# Patient Record
Sex: MALE | Race: BLACK OR AFRICAN AMERICAN | URBAN - METROPOLITAN AREA
[De-identification: names, ages, dates, MRNs, and addresses within clinical notes are randomized per-mention and may not be internally consistent; named-entity substitution may affect disease eponyms.]

---

## 2024-05-03 ENCOUNTER — APPOINTMENT (EMERGENCY)
Dept: RADIOLOGY | Facility: HOSPITAL | Age: 64
DRG: 469 | End: 2024-05-03
Payer: MEDICARE

## 2024-05-03 ENCOUNTER — HOSPITAL ENCOUNTER (INPATIENT)
Facility: HOSPITAL | Age: 64
LOS: 11 days | Discharge: NURSING, REHAB OR LONG-TERM CARE | DRG: 469 | End: 2024-05-15
Attending: EMERGENCY MEDICINE | Admitting: INTERNAL MEDICINE
Payer: MEDICARE

## 2024-05-03 DIAGNOSIS — M54.41 CHRONIC RIGHT-SIDED LOW BACK PAIN WITH RIGHT-SIDED SCIATICA: ICD-10-CM

## 2024-05-03 DIAGNOSIS — I45.5 SINUS PAUSE: ICD-10-CM

## 2024-05-03 DIAGNOSIS — L03.811 ABSCESS OR CELLULITIS OF SCALP: ICD-10-CM

## 2024-05-03 DIAGNOSIS — R07.9 ACUTE CHEST PAIN: Primary | ICD-10-CM

## 2024-05-03 DIAGNOSIS — G89.29 CHRONIC RIGHT-SIDED LOW BACK PAIN WITH RIGHT-SIDED SCIATICA: ICD-10-CM

## 2024-05-03 DIAGNOSIS — J81.1 PULMONARY EDEMA: ICD-10-CM

## 2024-05-03 DIAGNOSIS — I48.92 ATRIAL FLUTTER (HCC): ICD-10-CM

## 2024-05-03 DIAGNOSIS — N17.9 ACUTE RENAL FAILURE (ARF) (HCC): ICD-10-CM

## 2024-05-03 LAB
ALBUMIN SERPL BCP-MCNC: 3.2 G/DL (ref 3.5–5)
ALP SERPL-CCNC: 147 U/L (ref 34–104)
ALT SERPL W P-5'-P-CCNC: 20 U/L (ref 7–52)
ANION GAP SERPL CALCULATED.3IONS-SCNC: 7 MMOL/L (ref 4–13)
AST SERPL W P-5'-P-CCNC: 20 U/L (ref 13–39)
BASOPHILS # BLD AUTO: 0.03 THOUSANDS/ÂΜL (ref 0–0.1)
BASOPHILS NFR BLD AUTO: 1 % (ref 0–1)
BILIRUB SERPL-MCNC: 0.36 MG/DL (ref 0.2–1)
BNP SERPL-MCNC: 220 PG/ML (ref 0–100)
BUN SERPL-MCNC: 59 MG/DL (ref 5–25)
CALCIUM ALBUM COR SERPL-MCNC: 8.8 MG/DL (ref 8.3–10.1)
CALCIUM SERPL-MCNC: 8.2 MG/DL (ref 8.4–10.2)
CARDIAC TROPONIN I PNL SERPL HS: 30 NG/L
CHLORIDE SERPL-SCNC: 111 MMOL/L (ref 96–108)
CO2 SERPL-SCNC: 20 MMOL/L (ref 21–32)
CREAT SERPL-MCNC: 7.05 MG/DL (ref 0.6–1.3)
EOSINOPHIL # BLD AUTO: 0.27 THOUSAND/ÂΜL (ref 0–0.61)
EOSINOPHIL NFR BLD AUTO: 4 % (ref 0–6)
ERYTHROCYTE [DISTWIDTH] IN BLOOD BY AUTOMATED COUNT: 14.5 % (ref 11.6–15.1)
FLUAV RNA RESP QL NAA+PROBE: NEGATIVE
FLUBV RNA RESP QL NAA+PROBE: NEGATIVE
GFR SERPL CREATININE-BSD FRML MDRD: 7 ML/MIN/1.73SQ M
GLUCOSE SERPL-MCNC: 191 MG/DL (ref 65–140)
GLUCOSE SERPL-MCNC: 193 MG/DL (ref 65–140)
HCT VFR BLD AUTO: 30.9 % (ref 36.5–49.3)
HGB BLD-MCNC: 9.6 G/DL (ref 12–17)
IMM GRANULOCYTES # BLD AUTO: 0.03 THOUSAND/UL (ref 0–0.2)
IMM GRANULOCYTES NFR BLD AUTO: 1 % (ref 0–2)
LYMPHOCYTES # BLD AUTO: 1.59 THOUSANDS/ÂΜL (ref 0.6–4.47)
LYMPHOCYTES NFR BLD AUTO: 25 % (ref 14–44)
MCH RBC QN AUTO: 30.8 PG (ref 26.8–34.3)
MCHC RBC AUTO-ENTMCNC: 31.1 G/DL (ref 31.4–37.4)
MCV RBC AUTO: 99 FL (ref 82–98)
MONOCYTES # BLD AUTO: 0.42 THOUSAND/ÂΜL (ref 0.17–1.22)
MONOCYTES NFR BLD AUTO: 7 % (ref 4–12)
NEUTROPHILS # BLD AUTO: 3.95 THOUSANDS/ÂΜL (ref 1.85–7.62)
NEUTS SEG NFR BLD AUTO: 62 % (ref 43–75)
NRBC BLD AUTO-RTO: 0 /100 WBCS
PLATELET # BLD AUTO: 233 THOUSANDS/UL (ref 149–390)
PMV BLD AUTO: 9.6 FL (ref 8.9–12.7)
POTASSIUM SERPL-SCNC: 5 MMOL/L (ref 3.5–5.3)
PROT SERPL-MCNC: 6.9 G/DL (ref 6.4–8.4)
RBC # BLD AUTO: 3.12 MILLION/UL (ref 3.88–5.62)
RSV RNA RESP QL NAA+PROBE: NEGATIVE
SARS-COV-2 RNA RESP QL NAA+PROBE: NEGATIVE
SODIUM SERPL-SCNC: 138 MMOL/L (ref 135–147)
WBC # BLD AUTO: 6.29 THOUSAND/UL (ref 4.31–10.16)

## 2024-05-03 PROCEDURE — 82728 ASSAY OF FERRITIN: CPT

## 2024-05-03 PROCEDURE — 82948 REAGENT STRIP/BLOOD GLUCOSE: CPT

## 2024-05-03 PROCEDURE — 82746 ASSAY OF FOLIC ACID SERUM: CPT

## 2024-05-03 PROCEDURE — 84484 ASSAY OF TROPONIN QUANT: CPT | Performed by: EMERGENCY MEDICINE

## 2024-05-03 PROCEDURE — NC001 PR NO CHARGE: Performed by: RADIOLOGY

## 2024-05-03 PROCEDURE — 85379 FIBRIN DEGRADATION QUANT: CPT | Performed by: EMERGENCY MEDICINE

## 2024-05-03 PROCEDURE — 36415 COLL VENOUS BLD VENIPUNCTURE: CPT | Performed by: EMERGENCY MEDICINE

## 2024-05-03 PROCEDURE — 82607 VITAMIN B-12: CPT

## 2024-05-03 PROCEDURE — 83550 IRON BINDING TEST: CPT

## 2024-05-03 PROCEDURE — 71046 X-RAY EXAM CHEST 2 VIEWS: CPT

## 2024-05-03 PROCEDURE — 99285 EMERGENCY DEPT VISIT HI MDM: CPT | Performed by: EMERGENCY MEDICINE

## 2024-05-03 PROCEDURE — 93005 ELECTROCARDIOGRAM TRACING: CPT

## 2024-05-03 PROCEDURE — 94760 N-INVAS EAR/PLS OXIMETRY 1: CPT

## 2024-05-03 PROCEDURE — 80053 COMPREHEN METABOLIC PANEL: CPT | Performed by: EMERGENCY MEDICINE

## 2024-05-03 PROCEDURE — 83540 ASSAY OF IRON: CPT

## 2024-05-03 PROCEDURE — 83880 ASSAY OF NATRIURETIC PEPTIDE: CPT | Performed by: EMERGENCY MEDICINE

## 2024-05-03 PROCEDURE — 0241U HB NFCT DS VIR RESP RNA 4 TRGT: CPT | Performed by: EMERGENCY MEDICINE

## 2024-05-03 PROCEDURE — 99285 EMERGENCY DEPT VISIT HI MDM: CPT

## 2024-05-03 PROCEDURE — 94002 VENT MGMT INPAT INIT DAY: CPT

## 2024-05-03 PROCEDURE — 85025 COMPLETE CBC W/AUTO DIFF WBC: CPT | Performed by: EMERGENCY MEDICINE

## 2024-05-04 ENCOUNTER — APPOINTMENT (INPATIENT)
Dept: NON INVASIVE DIAGNOSTICS | Facility: HOSPITAL | Age: 64
DRG: 469 | End: 2024-05-04
Payer: MEDICARE

## 2024-05-04 ENCOUNTER — APPOINTMENT (INPATIENT)
Dept: CT IMAGING | Facility: HOSPITAL | Age: 64
DRG: 469 | End: 2024-05-04
Payer: MEDICARE

## 2024-05-04 PROBLEM — N18.32 STAGE 3B CHRONIC KIDNEY DISEASE (HCC): Status: ACTIVE | Noted: 2022-07-21

## 2024-05-04 PROBLEM — N17.9 AKI (ACUTE KIDNEY INJURY) (HCC): Status: ACTIVE | Noted: 2022-06-20

## 2024-05-04 PROBLEM — G89.29 CHRONIC RIGHT-SIDED LOW BACK PAIN WITH RIGHT-SIDED SCIATICA: Status: ACTIVE | Noted: 2021-04-26

## 2024-05-04 PROBLEM — E78.5 HYPERLIPIDEMIA: Status: ACTIVE | Noted: 2022-06-22

## 2024-05-04 PROBLEM — G47.33 SEVERE OBSTRUCTIVE SLEEP APNEA: Chronic | Status: ACTIVE | Noted: 2017-10-18

## 2024-05-04 PROBLEM — I16.1 HYPERTENSIVE EMERGENCY: Status: ACTIVE | Noted: 2024-05-04

## 2024-05-04 PROBLEM — M54.41 CHRONIC RIGHT-SIDED LOW BACK PAIN WITH RIGHT-SIDED SCIATICA: Status: ACTIVE | Noted: 2021-04-26

## 2024-05-04 LAB
2HR DELTA HS TROPONIN: -3 NG/L
4HR DELTA HS TROPONIN: -2 NG/L
ALBUMIN SERPL BCP-MCNC: 3.1 G/DL (ref 3.5–5)
ALP SERPL-CCNC: 138 U/L (ref 34–104)
ALT SERPL W P-5'-P-CCNC: 18 U/L (ref 7–52)
ANION GAP SERPL CALCULATED.3IONS-SCNC: 7 MMOL/L (ref 4–13)
AST SERPL W P-5'-P-CCNC: 15 U/L (ref 13–39)
BASE EX.OXY STD BLDV CALC-SCNC: 63.5 % (ref 60–80)
BASE EXCESS BLDV CALC-SCNC: -7.4 MMOL/L
BILIRUB SERPL-MCNC: 0.43 MG/DL (ref 0.2–1)
BUN SERPL-MCNC: 58 MG/DL (ref 5–25)
CALCIUM ALBUM COR SERPL-MCNC: 8.8 MG/DL (ref 8.3–10.1)
CALCIUM SERPL-MCNC: 8.1 MG/DL (ref 8.4–10.2)
CARDIAC TROPONIN I PNL SERPL HS: 27 NG/L
CARDIAC TROPONIN I PNL SERPL HS: 28 NG/L
CHLORIDE SERPL-SCNC: 113 MMOL/L (ref 96–108)
CO2 SERPL-SCNC: 20 MMOL/L (ref 21–32)
CREAT SERPL-MCNC: 6.97 MG/DL (ref 0.6–1.3)
D DIMER PPP FEU-MCNC: 1.91 UG/ML FEU
FERRITIN SERPL-MCNC: 58 NG/ML (ref 24–336)
FOLATE SERPL-MCNC: 13.9 NG/ML
GFR SERPL CREATININE-BSD FRML MDRD: 7 ML/MIN/1.73SQ M
GLUCOSE SERPL-MCNC: 108 MG/DL (ref 65–140)
GLUCOSE SERPL-MCNC: 117 MG/DL (ref 65–140)
GLUCOSE SERPL-MCNC: 122 MG/DL (ref 65–140)
GLUCOSE SERPL-MCNC: 136 MG/DL (ref 65–140)
GLUCOSE SERPL-MCNC: 143 MG/DL (ref 65–140)
HCO3 BLDV-SCNC: 19.4 MMOL/L (ref 24–30)
IRON SATN MFR SERPL: 16 % (ref 15–50)
IRON SERPL-MCNC: 37 UG/DL (ref 50–212)
O2 CT BLDV-SCNC: 8.6 ML/DL
PCO2 BLDV: 45 MM HG (ref 42–50)
PH BLDV: 7.25 [PH] (ref 7.3–7.4)
PLATELET # BLD AUTO: 223 THOUSANDS/UL (ref 149–390)
PMV BLD AUTO: 9.1 FL (ref 8.9–12.7)
PO2 BLDV: 36.5 MM HG (ref 35–45)
POTASSIUM SERPL-SCNC: 4.7 MMOL/L (ref 3.5–5.3)
PROT SERPL-MCNC: 6.7 G/DL (ref 6.4–8.4)
SODIUM SERPL-SCNC: 140 MMOL/L (ref 135–147)
TIBC SERPL-MCNC: 235 UG/DL (ref 250–450)
UIBC SERPL-MCNC: 198 UG/DL (ref 155–355)
VIT B12 SERPL-MCNC: 140 PG/ML (ref 180–914)

## 2024-05-04 PROCEDURE — 80053 COMPREHEN METABOLIC PANEL: CPT

## 2024-05-04 PROCEDURE — 99222 1ST HOSP IP/OBS MODERATE 55: CPT | Performed by: INTERNAL MEDICINE

## 2024-05-04 PROCEDURE — 94760 N-INVAS EAR/PLS OXIMETRY 1: CPT

## 2024-05-04 PROCEDURE — 93306 TTE W/DOPPLER COMPLETE: CPT | Performed by: INTERNAL MEDICINE

## 2024-05-04 PROCEDURE — 36415 COLL VENOUS BLD VENIPUNCTURE: CPT | Performed by: EMERGENCY MEDICINE

## 2024-05-04 PROCEDURE — 99245 OFF/OP CONSLTJ NEW/EST HI 55: CPT | Performed by: STUDENT IN AN ORGANIZED HEALTH CARE EDUCATION/TRAINING PROGRAM

## 2024-05-04 PROCEDURE — 99223 1ST HOSP IP/OBS HIGH 75: CPT | Performed by: INTERNAL MEDICINE

## 2024-05-04 PROCEDURE — 82948 REAGENT STRIP/BLOOD GLUCOSE: CPT

## 2024-05-04 PROCEDURE — 85049 AUTOMATED PLATELET COUNT: CPT

## 2024-05-04 PROCEDURE — 82805 BLOOD GASES W/O2 SATURATION: CPT

## 2024-05-04 PROCEDURE — 84484 ASSAY OF TROPONIN QUANT: CPT | Performed by: EMERGENCY MEDICINE

## 2024-05-04 PROCEDURE — 93306 TTE W/DOPPLER COMPLETE: CPT

## 2024-05-04 PROCEDURE — 74176 CT ABD & PELVIS W/O CONTRAST: CPT

## 2024-05-04 RX ORDER — INSULIN GLARGINE 100 [IU]/ML
20 INJECTION, SOLUTION SUBCUTANEOUS
Status: DISCONTINUED | OUTPATIENT
Start: 2024-05-04 | End: 2024-05-04

## 2024-05-04 RX ORDER — INSULIN LISPRO 100 [IU]/ML
1-6 INJECTION, SOLUTION INTRAVENOUS; SUBCUTANEOUS
Status: DISCONTINUED | OUTPATIENT
Start: 2024-05-04 | End: 2024-05-15 | Stop reason: HOSPADM

## 2024-05-04 RX ORDER — LABETALOL HYDROCHLORIDE 5 MG/ML
10 INJECTION, SOLUTION INTRAVENOUS EVERY 4 HOURS PRN
Status: CANCELLED | OUTPATIENT
Start: 2024-05-04

## 2024-05-04 RX ORDER — METOPROLOL TARTRATE 50 MG/1
50 TABLET, FILM COATED ORAL EVERY 12 HOURS SCHEDULED
Status: CANCELLED | OUTPATIENT
Start: 2024-05-04

## 2024-05-04 RX ORDER — HEPARIN SODIUM 5000 [USP'U]/ML
5000 INJECTION, SOLUTION INTRAVENOUS; SUBCUTANEOUS EVERY 8 HOURS SCHEDULED
Status: DISCONTINUED | OUTPATIENT
Start: 2024-05-04 | End: 2024-05-04

## 2024-05-04 RX ORDER — NITROGLYCERIN 0.4 MG/1
0.4 TABLET SUBLINGUAL ONCE
Status: COMPLETED | OUTPATIENT
Start: 2024-05-04 | End: 2024-05-04

## 2024-05-04 RX ORDER — ACETAMINOPHEN 325 MG/1
650 TABLET ORAL EVERY 6 HOURS PRN
Status: DISCONTINUED | OUTPATIENT
Start: 2024-05-04 | End: 2024-05-15 | Stop reason: HOSPADM

## 2024-05-04 RX ORDER — CEPHALEXIN 250 MG/1
500 CAPSULE ORAL EVERY 8 HOURS SCHEDULED
Status: DISCONTINUED | OUTPATIENT
Start: 2024-05-04 | End: 2024-05-04

## 2024-05-04 RX ORDER — LABETALOL HYDROCHLORIDE 5 MG/ML
10 INJECTION, SOLUTION INTRAVENOUS EVERY 4 HOURS PRN
Status: DISCONTINUED | OUTPATIENT
Start: 2024-05-04 | End: 2024-05-07

## 2024-05-04 RX ORDER — BUMETANIDE 0.25 MG/ML
1 INJECTION INTRAMUSCULAR; INTRAVENOUS CONTINUOUS
Status: DISCONTINUED | OUTPATIENT
Start: 2024-05-04 | End: 2024-05-05

## 2024-05-04 RX ORDER — SODIUM BICARBONATE 650 MG/1
650 TABLET ORAL
Status: DISCONTINUED | OUTPATIENT
Start: 2024-05-04 | End: 2024-05-06

## 2024-05-04 RX ORDER — CEPHALEXIN 250 MG/1
250 CAPSULE ORAL EVERY 12 HOURS SCHEDULED
Status: DISCONTINUED | OUTPATIENT
Start: 2024-05-04 | End: 2024-05-08

## 2024-05-04 RX ORDER — LABETALOL HYDROCHLORIDE 5 MG/ML
10 INJECTION, SOLUTION INTRAVENOUS EVERY 4 HOURS
Status: DISCONTINUED | OUTPATIENT
Start: 2024-05-04 | End: 2024-05-04

## 2024-05-04 RX ORDER — FUROSEMIDE 10 MG/ML
40 INJECTION INTRAMUSCULAR; INTRAVENOUS 2 TIMES DAILY
Status: CANCELLED | OUTPATIENT
Start: 2024-05-04

## 2024-05-04 RX ORDER — FUROSEMIDE 10 MG/ML
20 INJECTION INTRAMUSCULAR; INTRAVENOUS ONCE
Status: COMPLETED | OUTPATIENT
Start: 2024-05-04 | End: 2024-05-04

## 2024-05-04 RX ORDER — INSULIN GLARGINE 100 [IU]/ML
20 INJECTION, SOLUTION SUBCUTANEOUS EVERY 12 HOURS SCHEDULED
Status: DISCONTINUED | OUTPATIENT
Start: 2024-05-04 | End: 2024-05-06

## 2024-05-04 RX ORDER — METOPROLOL TARTRATE 50 MG/1
50 TABLET, FILM COATED ORAL EVERY 12 HOURS SCHEDULED
Status: DISCONTINUED | OUTPATIENT
Start: 2024-05-04 | End: 2024-05-05

## 2024-05-04 RX ADMIN — INSULIN GLARGINE 20 UNITS: 100 INJECTION, SOLUTION SUBCUTANEOUS at 10:39

## 2024-05-04 RX ADMIN — METOPROLOL TARTRATE 50 MG: 50 TABLET, FILM COATED ORAL at 12:35

## 2024-05-04 RX ADMIN — APIXABAN 2.5 MG: 2.5 TABLET, FILM COATED ORAL at 18:22

## 2024-05-04 RX ADMIN — CEPHALEXIN 250 MG: 250 CAPSULE ORAL at 20:18

## 2024-05-04 RX ADMIN — METOPROLOL TARTRATE 50 MG: 50 TABLET, FILM COATED ORAL at 20:18

## 2024-05-04 RX ADMIN — ACETAMINOPHEN 650 MG: 325 TABLET, FILM COATED ORAL at 19:42

## 2024-05-04 RX ADMIN — NITROGLYCERIN 0.4 MG: 0.4 TABLET SUBLINGUAL at 00:55

## 2024-05-04 RX ADMIN — Medication 1 MG/HR: at 10:39

## 2024-05-04 RX ADMIN — CEPHALEXIN 250 MG: 250 CAPSULE ORAL at 15:55

## 2024-05-04 RX ADMIN — FUROSEMIDE 20 MG: 10 INJECTION, SOLUTION INTRAMUSCULAR; INTRAVENOUS at 05:21

## 2024-05-04 RX ADMIN — Medication 1 MG/HR: at 15:43

## 2024-05-04 RX ADMIN — Medication 10 MG: at 22:33

## 2024-05-04 RX ADMIN — INSULIN GLARGINE 20 UNITS: 100 INJECTION, SOLUTION SUBCUTANEOUS at 20:18

## 2024-05-04 RX ADMIN — SODIUM BICARBONATE 650 MG: 650 TABLET ORAL at 10:38

## 2024-05-04 RX ADMIN — APIXABAN 2.5 MG: 2.5 TABLET, FILM COATED ORAL at 12:35

## 2024-05-04 RX ADMIN — HEPARIN SODIUM 5000 UNITS: 5000 INJECTION INTRAVENOUS; SUBCUTANEOUS at 05:21

## 2024-05-04 RX ADMIN — Medication 1 MG/HR: at 20:04

## 2024-05-04 RX ADMIN — SODIUM BICARBONATE 650 MG: 650 TABLET ORAL at 18:22

## 2024-05-04 NOTE — ED NOTES
Repeat EKG done. RN concern for EKG changes. Signed and evaluated by Dr. Flores. Copied and filed in chart.      Vandana Godoy RN  05/03/24 8108

## 2024-05-04 NOTE — CONSULTS
Cardiology   Jerod Worthington 63 y.o. male MRN: [id number]  Unit/Bed#: ED-21 Encounter: [id number]      Reason for Consult / Principal Problem:chest pain/pulmonary edema    Physician Requesting Consult:  Thanh De La Rosa MD    Cardiologist: Dr. Davis    PCP:No primary care provider on file.      Assessment/Plan:  Atrial flutter   Add Beta blocker  Metoprolol 50 mg P q12.  Needs anticoagulation.    Eliquis 2.5 BID  cleared for use by nephrology.    Shortness of breath with chest pain resolved at the present time.Troponins are negative.  Recommend 2D echocardiogram.    History of chronic kidney disease/acute renal failure -avoid NSAIDs and any other nephrotoxic agents.  Nephrology seen patient.  Daily weight and intake and output.      Bumex infusion at 1 mg/hr    Hypertension losartan on hold BP still elevated at 179/100.  Consider labetalol 10 mg IV every 10 minutes until systolic pressures below 160.    Hyperlipidemia Lipitor 40 mg daily and aspirin 81 mg daily      HPI: Patient was interviewed via  #[id number]     Jerod Worthington 63 y.o. year old male who presents with shortness of breath, cough and sneezing for 2 days and significant lower leg edema.  Patient had report no contacts with sick patient.  He recently traveled back from [place] after visiting his brother for 1 month.  Patient has a history of hypertension diabetes type 2 and CKD Stage IV.  Patient has no PCP but appears to follow with diabetic clinic at [facility] and to [facility].  On admission his blood pressure was 193/120.  Patient does note that he had significant lower leg edema and chest pain when he exerted himself over the past month.      Patient was seen at the diabetic clinic and had lab drawn yesterday. His HDL was 35, , cholesterol 170 and creatinine triglycerides were 80.  Creatinine was 7.2 and GFR was not calculated.    Patient does have a family history of coronary disease his mother had bypass surgery.   His  brother is diabetic and has kidney problems. He  lost a relative to kidney disease who was on dialysis.    Patient states he takes medication for his blood pressure but does not know the name of it.  He did state that he was taken off 1 blood pressure medication due to his kidney function.  Patient takes Lantus insulin subcu and does not check his glucose at home.  His weight on admission today was 218 pounds.  Patient is currently pain-free and no longer short of breath.    Patient is unaware of names of the medications he is currently on.  After review of care everywhere patient has been on aspirin 81 mg daily Lipitor 40 mg daily and losartan 100 mg daily.  He also takes Lantus insulin daily.    Patient's creatinine on admission was 6.97 and his GFR is 7.      Family History: History reviewed. No pertinent family history.  Historical Information   History reviewed. No pertinent past medical history.  History reviewed. No pertinent surgical history.  Social History   Social History     Substance and Sexual Activity   Alcohol Use None     Social History     Substance and Sexual Activity   Drug Use Not on file     Social History     Tobacco Use   Smoking Status Not on file   Smokeless Tobacco Not on file     Family History: History reviewed. No pertinent family history.    Review of Systems:  Review of Systems   Constitutional:  Positive for fatigue.   HENT: Negative.     Eyes: Negative.    Respiratory:  Positive for shortness of breath.    Cardiovascular:  Positive for chest pain.   Gastrointestinal:         Abdominal bloating   Genitourinary: Negative.    Musculoskeletal: Negative.    Neurological:  Positive for light-headedness.   Hematological: Negative.    Psychiatric/Behavioral: Negative.             Scheduled Meds:  Current Facility-Administered Medications   Medication Dose Route Frequency Provider Last Rate    acetaminophen  650 mg Oral Q6H ROHANN Osiris Singh DO      bumetanide (BUMEX) 12.5 mg  "infusion 50 mL  1 mg/hr Intravenous Continuous Joselyn Reyes Bahamonde, MD      heparin (porcine)  5,000 Units Subcutaneous Q8H BOBBI Osiris Singh, DO      insulin glargine  20 Units Subcutaneous Q12H BOBBI Osiris Singh, DO      insulin lispro  1-6 Units Subcutaneous TID AC Osiris Singh, DO      labetalol  10 mg Intravenous Q4H PRN Osiris Singh, DO      sodium bicarbonate  650 mg Oral BID after meals Joselyn Reyes Bahamonde, MD       Continuous Infusions:bumetanide (BUMEX) 12.5 mg infusion 50 mL, 1 mg/hr      PRN Meds:.  acetaminophen    labetalol  PTA meds:   None       No Known Allergies    Objective   Vitals: Blood pressure 125/95, pulse (!) 121, temperature 97.9 °F (36.6 °C), temperature source Oral, resp. rate 16, height 5' 4\" (1.626 m), weight 98.9 kg (218 lb), SpO2 98%., Body mass index is 37.42 kg/m².,   Orthostatic Blood Pressures      Flowsheet Row Most Recent Value   Blood Pressure 125/95 filed at 05/04/2024 0700   Patient Position - Orthostatic VS Lying filed at 05/04/2024 0700            No intake or output data in the 24 hours ending 05/04/24 1033    Invasive Devices       Peripheral Intravenous Line  Duration             Peripheral IV 05/03/24 Left Antecubital <1 day                    Physical Exam  Constitutional:       Appearance: Normal appearance.   HENT:      Head: Normocephalic and atraumatic.      Mouth/Throat:      Mouth: Mucous membranes are moist.   Cardiovascular:      Rate and Rhythm: Tachycardia present.      Heart sounds: Normal heart sounds.   Abdominal:      Comments: Distended and semirigid   Musculoskeletal:      Right lower leg: Edema present.      Left lower leg: Edema present.   Skin:     General: Skin is warm and dry.      Capillary Refill: Capillary refill takes 2 to 3 seconds.   Neurological:      Mental Status: He is alert and oriented to person, place, and time.   Psychiatric:         Behavior: Behavior normal.         Lab Results:   Recent Results " (from the past 24 hour(s))   ALBUMIN / CREATININE URINE RATIO    Collection Time: 05/03/24 12:47 PM   Result Value Ref Range    Creatinine, Ur 151.9 50.0 - 200.0 mg/dL    Albumin,U,Random 476.9 (H) <3.0 mg/dL    Albumin Creat Ratio 3,139.6 (H) <30.0 mg/gm CREA   HEMOGLOBIN A1C    Collection Time: 05/03/24 12:47 PM   Result Value Ref Range    Hemoglobin A1C 7.8 (H) <5.7 %    eAG, EST AVG Glucose 177 mg/dL   COMPREHENSIVE METABOLIC PANEL    Collection Time: 05/03/24 12:47 PM   Result Value Ref Range    Glucose 90 65 - 99 mg/dL    BUN 59 (H) 7 - 28 mg/dL    Creatinine 7.25 (H) 0.53 - 1.30 mg/dL    Sodium 142 135 - 145 mmol/L    Potassium 5.5 (H) 3.5 - 5.2 mmol/L    Chloride 113 (H) 100 - 109 mmol/L    Carbon Dioxide 20 (L) 21 - 31 mmol/L    Calcium 8.4 (L) 8.5 - 10.1 mg/dL    Alkaline Phosphatase 136 (H) 35 - 120 U/L    ALBUMIN 3.2 (L) 3.5 - 5.7 g/dL    Total Bilirubin 0.5 0.2 - 1.0 mg/dL    Protein, Total 6.5 6.3 - 8.3 g/dL    AST 15 <41 U/L    ALT 16 <56 U/L    ANION GAP 9 3 - 11    eGFRcr 8 (L) >59    eGFR Comment Interpretive information: calculated GFR    Fingerstick Glucose (POCT)    Collection Time: 05/03/24  9:08 PM   Result Value Ref Range    POC Glucose 191 (H) 65 - 140 mg/dl   CBC and differential    Collection Time: 05/03/24  9:54 PM   Result Value Ref Range    WBC 6.29 4.31 - 10.16 Thousand/uL    RBC 3.12 (L) 3.88 - 5.62 Million/uL    Hemoglobin 9.6 (L) 12.0 - 17.0 g/dL    Hematocrit 30.9 (L) 36.5 - 49.3 %    MCV 99 (H) 82 - 98 fL    MCH 30.8 26.8 - 34.3 pg    MCHC 31.1 (L) 31.4 - 37.4 g/dL    RDW 14.5 11.6 - 15.1 %    MPV 9.6 8.9 - 12.7 fL    Platelets 233 149 - 390 Thousands/uL    nRBC 0 /100 WBCs    Segmented % 62 43 - 75 %    Immature Grans % 1 0 - 2 %    Lymphocytes % 25 14 - 44 %    Monocytes % 7 4 - 12 %    Eosinophils Relative 4 0 - 6 %    Basophils Relative 1 0 - 1 %    Absolute Neutrophils 3.95 1.85 - 7.62 Thousands/µL    Absolute Immature Grans 0.03 0.00 - 0.20 Thousand/uL    Absolute  "Lymphocytes 1.59 0.60 - 4.47 Thousands/µL    Absolute Monocytes 0.42 0.17 - 1.22 Thousand/µL    Eosinophils Absolute 0.27 0.00 - 0.61 Thousand/µL    Basophils Absolute 0.03 0.00 - 0.10 Thousands/µL   Comprehensive metabolic panel    Collection Time: 05/03/24  9:54 PM   Result Value Ref Range    Sodium 138 135 - 147 mmol/L    Potassium 5.0 3.5 - 5.3 mmol/L    Chloride 111 (H) 96 - 108 mmol/L    CO2 20 (L) 21 - 32 mmol/L    ANION GAP 7 4 - 13 mmol/L    BUN 59 (H) 5 - 25 mg/dL    Creatinine 7.05 (H) 0.60 - 1.30 mg/dL    Glucose 193 (H) 65 - 140 mg/dL    Calcium 8.2 (L) 8.4 - 10.2 mg/dL    Corrected Calcium 8.8 8.3 - 10.1 mg/dL    AST 20 13 - 39 U/L    ALT 20 7 - 52 U/L    Alkaline Phosphatase 147 (H) 34 - 104 U/L    Total Protein 6.9 6.4 - 8.4 g/dL    Albumin 3.2 (L) 3.5 - 5.0 g/dL    Total Bilirubin 0.36 0.20 - 1.00 mg/dL    eGFR 7 ml/min/1.73sq m   HS Troponin 0hr (reflex protocol)    Collection Time: 05/03/24  9:54 PM   Result Value Ref Range    hs TnI 0hr 30 \"Refer to ACS Flowchart\"- see link ng/L   B-Type Natriuretic Peptide(BNP)    Collection Time: 05/03/24  9:54 PM   Result Value Ref Range     (H) 0 - 100 pg/mL   FLU/RSV/COVID - if FLU/RSV clinically relevant    Collection Time: 05/03/24  9:54 PM    Specimen: Nose; Nares   Result Value Ref Range    SARS-CoV-2 Negative Negative    INFLUENZA A PCR Negative Negative    INFLUENZA B PCR Negative Negative    RSV PCR Negative Negative   Folate    Collection Time: 05/03/24  9:54 PM   Result Value Ref Range    Folate 13.9 >5.9 ng/mL   Vitamin B12    Collection Time: 05/03/24  9:54 PM   Result Value Ref Range    Vitamin B-12 140 (L) 180 - 914 pg/mL   TIBC Panel (incl. Iron, TIBC, % Iron Saturation)    Collection Time: 05/03/24  9:54 PM   Result Value Ref Range    Iron Saturation 16 15 - 50 %    TIBC 235 (L) 250 - 450 ug/dL    Iron 37 (L) 50 - 212 ug/dL    UIBC 198 155 - 355 ug/dL   Ferritin    Collection Time: 05/03/24  9:54 PM   Result Value Ref Range    Ferritin " "58 24 - 336 ng/mL   ECG 12 lead    Collection Time: 05/03/24 10:14 PM   Result Value Ref Range    Ventricular Rate 140 BPM    Atrial Rate 140 BPM    VT Interval  ms    QRSD Interval 116 ms    QT Interval 354 ms    QTC Interval 540 ms    P Axis  degrees    QRS Axis -58 degrees    T Wave [place] 55 degrees   HS Troponin I 2hr    Collection Time: 05/03/24 11:44 PM   Result Value Ref Range    hs TnI 2hr 27 \"Refer to ACS Flowchart\"- see link ng/L    Delta 2hr hsTnI -3 <20 ng/L   D-dimer, quantitative    Collection Time: 05/03/24 11:44 PM   Result Value Ref Range    D-Dimer, Quant 1.91 (H) <0.50 ug/ml FEU   HS Troponin I 4hr    Collection Time: 05/04/24  2:33 AM   Result Value Ref Range    hs TnI 4hr 28 \"Refer to ACS Flowchart\"- see link ng/L    Delta 4hr hsTnI -2 <20 ng/L   Platelet count    Collection Time: 05/04/24  2:33 AM   Result Value Ref Range    Platelets 223 149 - 390 Thousands/uL    MPV 9.1 8.9 - 12.7 fL   Blood gas, venous    Collection Time: 05/04/24  2:33 AM   Result Value Ref Range    pH, Ramírez 7.253 (L) 7.300 - 7.400    pCO2, Ramírez 45.0 42.0 - 50.0 mm Hg    pO2, Ramírez 36.5 35.0 - 45.0 mm Hg    HCO3, Ramírez 19.4 (L) 24 - 30 mmol/L    Base Excess, Ramírez -7.4 mmol/L    O2 Content, Ramírez 8.6 ml/dL    O2 HGB, VENOUS 63.5 60.0 - 80.0 %   Comprehensive metabolic panel    Collection Time: 05/04/24  5:21 AM   Result Value Ref Range    Sodium 140 135 - 147 mmol/L    Potassium 4.7 3.5 - 5.3 mmol/L    Chloride 113 (H) 96 - 108 mmol/L    CO2 20 (L) 21 - 32 mmol/L    ANION GAP 7 4 - 13 mmol/L    BUN 58 (H) 5 - 25 mg/dL    Creatinine 6.97 (H) 0.60 - 1.30 mg/dL    Glucose 117 65 - 140 mg/dL    Calcium 8.1 (L) 8.4 - 10.2 mg/dL    Corrected Calcium 8.8 8.3 - 10.1 mg/dL    AST 15 13 - 39 U/L    ALT 18 7 - 52 U/L    Alkaline Phosphatase 138 (H) 34 - 104 U/L    Total Protein 6.7 6.4 - 8.4 g/dL    Albumin 3.1 (L) 3.5 - 5.0 g/dL    Total Bilirubin 0.43 0.20 - 1.00 mg/dL    eGFR 7 ml/min/1.73sq m   Fingerstick Glucose (POCT)    Collection Time: " 05/04/24  7:28 AM   Result Value Ref Range    POC Glucose 108 65 - 140 mg/dl   ]    Imaging:     EKG: Sinus tachycardia  CHEST X-RAY: Severe pulmonary edema with small effusions  CT of abdomen and pelvis without contrast results pending    ECHO: Currently being performed    Code Status: Level 1 full code    Epic/ Allscripts/Care Everywhere records reviewed:    * Please Note: Fluency DirectDictation voice to text software may have been used in the creation of this document. **

## 2024-05-04 NOTE — PLAN OF CARE
Problem: PAIN - ADULT  Goal: Verbalizes/displays adequate comfort level or baseline comfort level  Description: Interventions:  - Encourage patient to monitor pain and request assistance  - Assess pain using appropriate pain scale  - Administer analgesics based on type and severity of pain and evaluate response  - Implement non-pharmacological measures as appropriate and evaluate response  - Consider cultural and social influences on pain and pain management  - Notify physician/advanced practitioner if interventions unsuccessful or patient reports new pain  Outcome: Progressing     Problem: INFECTION - ADULT  Goal: Absence or prevention of progression during hospitalization  Description: INTERVENTIONS:  - Assess and monitor for signs and symptoms of infection  - Monitor lab/diagnostic results  - Monitor all insertion sites, i.e. indwelling lines, tubes, and drains  - Monitor endotracheal if appropriate and nasal secretions for changes in amount and color  - [place] appropriate cooling/warming therapies per order  - Administer medications as ordered  - Instruct and encourage patient and family to use good hand hygiene technique  - Identify and instruct in appropriate isolation precautions for identified infection/condition  Outcome: Progressing  Goal: Absence of fever/infection during neutropenic period  Description: INTERVENTIONS:  - Monitor WBC    Outcome: Progressing     Problem: SAFETY ADULT  Goal: Patient will remain free of falls  Description: INTERVENTIONS:  - Educate patient/family on patient safety including physical limitations  - Instruct patient to call for assistance with activity   - Consult OT/PT to assist with strengthening/mobility   - Keep Call bell within reach  - Keep bed low and locked with side rails adjusted as appropriate  - Keep care items and personal belongings within reach  - Initiate and maintain comfort rounds  - Make Fall Risk Sign visible to staff  - Apply yellow socks and bracelet  for high fall risk patients  - Consider moving patient to room near nurses station  Outcome: Progressing  Goal: Maintain or return to baseline ADL function  Description: INTERVENTIONS:  -  Assess patient's ability to carry out ADLs; assess patient's baseline for ADL function and identify physical deficits which impact ability to perform ADLs (bathing, care of mouth/teeth, toileting, grooming, dressing, etc.)  - Assess/evaluate cause of self-care deficits   - Assess range of motion  - Assess patient's mobility; develop plan if impaired  - Assess patient's need for assistive devices and provide as appropriate  - Encourage maximum independence but intervene and supervise when necessary  - Involve family in performance of ADLs  - Assess for home care needs following discharge   - Consider OT consult to assist with ADL evaluation and planning for discharge  - Provide patient education as appropriate  Outcome: Progressing  Goal: Maintains/Returns to pre admission functional level  Description: INTERVENTIONS:  - Perform AM-PAC 6 Click Basic Mobility/ Daily Activity assessment daily.  - Set and communicate daily mobility goal to care team and patient/family/caregiver.   - Collaborate with rehabilitation services on mobility goals if consulted  - Out of bed for toileting  - Record patient progress and toleration of activity level   Outcome: Progressing     Problem: DISCHARGE PLANNING  Goal: Discharge to home or other facility with appropriate resources  Description: INTERVENTIONS:  - Identify barriers to discharge w/patient and caregiver  - Arrange for needed discharge resources and transportation as appropriate  - Identify discharge learning needs (meds, wound care, etc.)  - Arrange for interpretive services to assist at discharge as needed  - Refer to Case Management Department for coordinating discharge planning if the patient needs post-hospital services based on physician/advanced practitioner order or complex needs  related to functional status, cognitive ability, or social support system  Outcome: Progressing     Problem: Knowledge Deficit  Goal: Patient/family/caregiver demonstrates understanding of disease process, treatment plan, medications, and discharge instructions  Description: Complete learning assessment and assess knowledge base.  Interventions:  - Provide teaching at level of understanding  - Provide teaching via preferred learning methods  Outcome: Progressing

## 2024-05-04 NOTE — ASSESSMENT & PLAN NOTE
History of hypertension, not currently taking any medications at home  Patient presented to ED with chest pain and shortness of breath    In the ED, Blood Pressure: (!) 193/120  -Nitroglycerin was administered  Blood Pressure: 157/99    Plan:  Continue to monitor blood pressure  - Goal of 160/100   Cardiology consulted, appreciate recommendations  PRN labetalol for SBP >180 or DBP>120

## 2024-05-04 NOTE — ASSESSMENT & PLAN NOTE
Home regimen of Lantus 20 units at lunch and at bedtime    Lab Results   Component Value Date    HGBA1C 7.8 (H) 05/03/2024     Recent Labs     05/03/24  2108   POCGLU 191*     Blood Sugar Average: Last 72 hrs:  (P) 191      Plan:  Continue home lantus  SSI  Hypoglycemic protocol

## 2024-05-04 NOTE — ED PROVIDER NOTES
History  Chief Complaint   Patient presents with    Shortness of Breath     Pt. Brought by EMS from home with c/o SOB+ chest pain. Flu like symptoms since today, cough + sneezing,  denies fever. Pt. Reports flying in from [place] yesterday. Has kidney problems as well. Diabetic.       63-year-old male presents with 2 days of cough, substernal chest pain, he reports the cough is dry but he reports he is also had mucus, and reports fatigue, shortness of breath, the patient denies any fever, headache, dizziness, nausea, vomiting, constipation or diarrhea, urinary complaints or abdominal pain or other complaints.  The patient recently came back from [place], reports no sick contacts, has a history of diabetes, hypertension, hyperlipidemia, and renal dysfunction.        None       History reviewed. No pertinent past medical history.    History reviewed. No pertinent surgical history.    History reviewed. No pertinent family history.  I have reviewed and agree with the history as documented.    E-Cigarette/Vaping     E-Cigarette/Vaping Substances          Review of Systems   Constitutional:  Negative for fever.   HENT:  Positive for congestion.    Eyes:  Negative for visual disturbance.   Respiratory:  Positive for cough and shortness of breath.    Cardiovascular:  Positive for chest pain.   Gastrointestinal:  Negative for abdominal pain, diarrhea and vomiting.   Endocrine: Negative for polyuria.   Genitourinary:  Negative for dysuria and hematuria.   Musculoskeletal:  Negative for myalgias.   Neurological:  Negative for dizziness and headaches.       Physical Exam  Physical Exam  Vitals and nursing note reviewed.   Constitutional:       General: He is not in acute distress.     Appearance: Normal appearance. He is obese.   HENT:      Head: Normocephalic and atraumatic.      Right Ear: External ear normal.      Left Ear: External ear normal.      Mouth/Throat:      Mouth: Mucous membranes are moist.      Pharynx:  Oropharynx is clear.   Eyes:      Conjunctiva/sclera: Conjunctivae normal.      Pupils: Pupils are equal, round, and reactive to light.   Cardiovascular:      Rate and Rhythm: Normal rate and regular rhythm.      Heart sounds: Normal heart sounds.   Pulmonary:      Effort: Pulmonary effort is normal. No respiratory distress.      Breath sounds: Rales present.      Comments: Reproducible tenderness to palpation of the anterior chest wall  Abdominal:      General: Abdomen is flat. There is no distension.      Palpations: Abdomen is soft.      Tenderness: There is no abdominal tenderness.   Musculoskeletal:         General: No deformity. Normal range of motion.      Cervical back: Normal range of motion.   Skin:     General: Skin is warm and dry.      Comments: Small 0.5 cm area of purulence with cellulitic changes on the midline posterior scalp, actively draining   Neurological:      General: No focal deficit present.      Mental Status: He is alert and oriented to person, place, and time.   Psychiatric:         Mood and Affect: Mood normal.         Behavior: Behavior normal.         Vital Signs  ED Triage Vitals   Temperature Pulse Respirations Blood Pressure SpO2   05/03/24 2112 05/03/24 2106 05/03/24 2106 05/03/24 2106 05/03/24 2106   97.9 °F (36.6 °C) (!) 141 22 (!) 169/107 98 %      Temp Source Heart Rate Source Patient Position - Orthostatic VS BP Location FiO2 (%)   05/03/24 2112 05/03/24 2106 05/03/24 2106 05/03/24 2106 --   Oral Monitor Sitting Right arm       Pain Score       05/03/24 2106       8           Vitals:    05/04/24 0700 05/04/24 1100 05/04/24 1130 05/04/24 1235   BP: 125/95 150/95 146/84 154/95   Pulse: (!) 121 (!) 110 102 (!) 129   Patient Position - Orthostatic VS: Lying Lying Lying          Visual Acuity      ED Medications  Medications   acetaminophen (TYLENOL) tablet 650 mg (has no administration in time range)   insulin lispro (HumALOG/ADMELOG) 100 units/mL subcutaneous injection 1-6 Units  ( Subcutaneous Not Given 5/4/24 1109)   insulin glargine (LANTUS) subcutaneous injection 20 Units 0.2 mL (20 Units Subcutaneous Given 5/4/24 1039)   labetalol (NORMODYNE) injection 10 mg (has no administration in time range)   sodium bicarbonate tablet 650 mg (650 mg Oral Given 5/4/24 1038)   bumetanide (BUMEX) 12.5 mg infusion 50 mL (1 mg/hr Intravenous New Bag 5/4/24 1039)   metoprolol tartrate (LOPRESSOR) tablet 50 mg (50 mg Oral Given 5/4/24 1235)   apixaban (ELIQUIS) tablet 2.5 mg (2.5 mg Oral Given 5/4/24 1235)   cephalexin (KEFLEX) capsule 500 mg (has no administration in time range)   nitroglycerin (NITROSTAT) SL tablet 0.4 mg (0.4 mg Sublingual Given 5/4/24 0055)   furosemide (LASIX) injection 20 mg (20 mg Intravenous Given 5/4/24 0521)       Diagnostic Studies  Results Reviewed       Procedure Component Value Units Date/Time    Fingerstick Glucose (POCT) [[id number]]  (Abnormal) Collected: 05/04/24 1057    Lab Status: Final result Specimen: Blood Updated: 05/04/24 1100     POC Glucose 143 mg/dl     Fingerstick Glucose (POCT) [[id number]]  (Normal) Collected: 05/04/24 0728    Lab Status: Final result Specimen: Blood Updated: 05/04/24 0729     POC Glucose 108 mg/dl     Comprehensive metabolic panel [[id number]]  (Abnormal) Collected: 05/04/24 0521    Lab Status: Final result Specimen: Blood from Arm, Left Updated: 05/04/24 0559     Sodium 140 mmol/L      Potassium 4.7 mmol/L      Chloride 113 mmol/L      CO2 20 mmol/L      ANION GAP 7 mmol/L      BUN 58 mg/dL      Creatinine 6.97 mg/dL      Glucose 117 mg/dL      Calcium 8.1 mg/dL      Corrected Calcium 8.8 mg/dL      AST 15 U/L      ALT 18 U/L      Alkaline Phosphatase 138 U/L      Total Protein 6.7 g/dL      Albumin 3.1 g/dL      Total Bilirubin 0.43 mg/dL      eGFR 7 ml/min/1.73sq m     Narrative:      National Kidney Disease Foundation guidelines for Chronic Kidney Disease (CKD):     Stage 1 with normal or high GFR (GFR > 90 mL/min/1.73 square meters)     Stage 2 Mild CKD (GFR = 60-89 mL/min/1.73 square meters)    Stage 3A Moderate CKD (GFR = 45-59 mL/min/1.73 square meters)    Stage 3B Moderate CKD (GFR = 30-44 mL/min/1.73 square meters)    Stage 4 Severe CKD (GFR = 15-29 mL/min/1.73 square meters)    Stage 5 End Stage CKD (GFR <15 mL/min/1.73 square meters)  Note: GFR calculation is accurate only with a steady state creatinine    Folate [[id number]]  (Normal) Collected: 05/03/24 2154    Lab Status: Final result Specimen: Blood from Arm, Right Updated: 05/04/24 0502     Folate 13.9 ng/mL     Vitamin B12 [[id number]]  (Abnormal) Collected: 05/03/24 2154    Lab Status: Final result Specimen: Blood from Arm, Right Updated: 05/04/24 0502     Vitamin B-12 140 pg/mL     TIBC Panel (incl. Iron, TIBC, % Iron Saturation) [[id number]]  (Abnormal) Collected: 05/03/24 2154    Lab Status: Final result Specimen: Blood from Arm, Right Updated: 05/04/24 0502     Iron Saturation 16 %      TIBC 235 ug/dL      Iron 37 ug/dL      UIBC 198 ug/dL     Ferritin [[id number]]  (Normal) Collected: 05/03/24 2154    Lab Status: Final result Specimen: Blood from Arm, Right Updated: 05/04/24 0502     Ferritin 58 ng/mL     HS Troponin I 4hr [[id number]]  (Normal) Collected: 05/04/24 0233    Lab Status: Final result Specimen: Blood from Arm, Left Updated: 05/04/24 0309     hs TnI 4hr 28 ng/L      Delta 4hr hsTnI -2 ng/L     Platelet count [[id number]]  (Normal) Collected: 05/04/24 0233    Lab Status: Final result Specimen: Blood from Arm, Left Updated: 05/04/24 0244     Platelets 223 Thousands/uL      MPV 9.1 fL     Blood gas, venous [[id number]]  (Abnormal) Collected: 05/04/24 0233    Lab Status: Final result Specimen: Blood from Arm, Left Updated: 05/04/24 0243     pH, Ramírez 7.253     pCO2, Ramírez 45.0 mm Hg      pO2, Ramírez 36.5 mm Hg      HCO3, Ramírez 19.4 mmol/L      Base Excess, Ramírez -7.4 mmol/L      O2 Content, Ramírez 8.6 ml/dL      O2 HGB, VENOUS 63.5 %     D-dimer, quantitative [[id number]]  (Abnormal)  Collected: 05/03/24 2344    Lab Status: Final result Specimen: Blood from Arm, Left Updated: 05/04/24 0047     D-Dimer, Quant 1.91 ug/ml FEU     Narrative:      In the evaluation for possible pulmonary embolism, in the appropriate (Well's Score of 4 or less) patient, the age adjusted d-dimer cutoff for this patient can be calculated as:    Age x 0.01 (in ug/mL) for Age-adjusted D-dimer exclusion threshold for a patient over 50 years.    HS Troponin I 2hr [[id number]]  (Normal) Collected: 05/03/24 2344    Lab Status: Final result Specimen: Blood from Arm, Left Updated: 05/04/24 0032     hs TnI 2hr 27 ng/L      Delta 2hr hsTnI -3 ng/L     B-Type Natriuretic Peptide(BNP) [[id number]]  (Abnormal) Collected: 05/03/24 2154    Lab Status: Final result Specimen: Blood from Arm, Right Updated: 05/03/24 2254      pg/mL     FLU/RSV/COVID - if FLU/RSV clinically relevant [[id number]]  (Normal) Collected: 05/03/24 2154    Lab Status: Final result Specimen: Nares from Nose Updated: 05/03/24 2249     SARS-CoV-2 Negative     INFLUENZA A PCR Negative     INFLUENZA B PCR Negative     RSV PCR Negative    Narrative:      FOR PEDIATRIC PATIENTS - copy/paste COVID Guidelines URL to browser: [email or web address]    SARS-CoV-2 assay is a Nucleic Acid Amplification assay intended for the  qualitative detection of nucleic acid from SARS-CoV-2 in nasopharyngeal  swabs. Results are for the presumptive identification of SARS-CoV-2 RNA.    Positive results are indicative of infection with SARS-CoV-2, the virus  causing COVID-19, but do not rule out bacterial infection or co-infection  with other viruses. Laboratories within the [place] and its  territories are required to report all positive results to the appropriate  public health authorities. Negative results do not preclude SARS-CoV-2  infection and should not be used as the sole basis for treatment or other  patient management  decisions. Negative results must be combined with  clinical observations, patient history, and epidemiological information.  This test has not been FDA cleared or approved.    This test has been authorized by FDA under an Emergency Use Authorization  (EUA). This test is only authorized for the duration of time the  declaration that circumstances exist justifying the authorization of the  emergency use of an in vitro diagnostic tests for detection of SARS-CoV-2  virus and/or diagnosis of COVID-19 infection under section 564(b)(1) of  the Act, 21 U.S.C. 360bbb-3(b)(1), unless the authorization is terminated  or revoked sooner. The test has been validated but independent review by FDA  and CLIA is pending.    Test performed using [employer] Gene[employer]pert: This RT-PCR assay targets N2,  a region unique to SARS-CoV-2. A conserved region in the E-gene was chosen  for pan-Sarbecovirus detection which includes SARS-CoV-2.    According to CMS-2020-01-R, this platform meets the definition of high-throughput technology.    HS Troponin 0hr (reflex protocol) [[id number]]  (Normal) Collected: 05/03/24 2154    Lab Status: Final result Specimen: Blood from Arm, Right Updated: 05/03/24 2231     hs TnI 0hr 30 ng/L     Comprehensive metabolic panel [[id number]]  (Abnormal) Collected: 05/03/24 2154    Lab Status: Final result Specimen: Blood from Arm, Right Updated: 05/03/24 2223     Sodium 138 mmol/L      Potassium 5.0 mmol/L      Chloride 111 mmol/L      CO2 20 mmol/L      ANION GAP 7 mmol/L      BUN 59 mg/dL      Creatinine 7.05 mg/dL      Glucose 193 mg/dL      Calcium 8.2 mg/dL      Corrected Calcium 8.8 mg/dL      AST 20 U/L      ALT 20 U/L      Alkaline Phosphatase 147 U/L      Total Protein 6.9 g/dL      Albumin 3.2 g/dL      Total Bilirubin 0.36 mg/dL      eGFR 7 ml/min/1.73sq m     Narrative:      National Kidney Disease Foundation guidelines for Chronic Kidney Disease (CKD):     Stage 1 with normal or high GFR (GFR > 90 mL/min/1.73  square meters)    Stage 2 Mild CKD (GFR = 60-89 mL/min/1.73 square meters)    Stage 3A Moderate CKD (GFR = 45-59 mL/min/1.73 square meters)    Stage 3B Moderate CKD (GFR = 30-44 mL/min/1.73 square meters)    Stage 4 Severe CKD (GFR = 15-29 mL/min/1.73 square meters)    Stage 5 End Stage CKD (GFR <15 mL/min/1.73 square meters)  Note: GFR calculation is accurate only with a steady state creatinine    CBC and differential [[id number]]  (Abnormal) Collected: 05/03/24 2154    Lab Status: Final result Specimen: Blood from Arm, Right Updated: 05/03/24 2213     WBC 6.29 Thousand/uL      RBC 3.12 Million/uL      Hemoglobin 9.6 g/dL      Hematocrit 30.9 %      MCV 99 fL      MCH 30.8 pg      MCHC 31.1 g/dL      RDW 14.5 %      MPV 9.6 fL      Platelets 233 Thousands/uL      nRBC 0 /100 WBCs      Segmented % 62 %      Immature Grans % 1 %      Lymphocytes % 25 %      Monocytes % 7 %      Eosinophils Relative 4 %      Basophils Relative 1 %      Absolute Neutrophils 3.95 Thousands/µL      Absolute Immature Grans 0.03 Thousand/uL      Absolute Lymphocytes 1.59 Thousands/µL      Absolute Monocytes 0.42 Thousand/µL      Eosinophils Absolute 0.27 Thousand/µL      Basophils Absolute 0.03 Thousands/µL     Fingerstick Glucose (POCT) [[id number]]  (Abnormal) Collected: 05/03/24 2108    Lab Status: Final result Specimen: Blood Updated: 05/03/24 2113     POC Glucose 191 mg/dl                    XR chest 2 views   Final Result by Linda Aj MD (05/04 0932)      Severe pulmonary edema with small effusions.            Workstation performed: [id number]         CT abdomen pelvis wo contrast    (Results Pending)              Procedures  ECG 12 Lead Documentation Only    Date/Time: 5/3/2024 10:39 PM    Performed by: Boy Guan MD  Authorized by: Boy Guan MD    ECG reviewed by me, the ED Provider: yes    Patient location:  ED  Previous ECG:     Previous ECG:  Unavailable  Interpretation:      Interpretation: abnormal    Rate:     ECG rate:  140    ECG rate assessment: tachycardic    Rhythm:     Rhythm: sinus tachycardia    Ectopy:     Ectopy: none    QRS:     QRS axis:  Left    QRS intervals:  Wide  Conduction:     Conduction: abnormal      Abnormal conduction: complete RBBB    ST segments:     ST segments:  Normal  T waves:     T waves: inverted             ED Course  ED Course as of 05/04/24 1315   Fri May 03, 2024   2329 My wet read of the patient's chest x-ray shows almost complete opacification of bilateral lung fields, consistent with severe pulmonary edema             SBIRT 20yo+      Flowsheet Row Most Recent Value   Initial Alcohol Screen: US AUDIT-C     1. How often do you have a drink containing alcohol? 0 Filed at: 05/03/2024 2110   2. How many drinks containing alcohol do you have on a typical day you are drinking?  0 Filed at: 05/03/2024 2110   3a. Male UNDER 65: How often do you have five or more drinks on one occasion? 0 Filed at: 05/03/2024 2110   3b. FEMALE Any Age, or MALE 65+: How often do you have 4 or more drinks on one occassion? 0 Filed at: 05/03/2024 2110   Audit-C Score 0 Filed at: 05/03/2024 2110   ABDIEL: How many times in the past year have you...    Used an illegal drug or used a prescription medication for non-medical reasons? Never Filed at: 05/03/2024 2110            Medical Decision Making  63-year-old male presents with cough, chest pain, shortness of breath, will be evaluated for pneumonia, as well as less likely ACS, fluid overload from his chronic renal failure, and his disposition is pending his workup.    Has a creatinine of 7, he will likely be admitted for further workup of his renal failure, although his potassium is normal.    Amount and/or Complexity of Data Reviewed  Labs: ordered.  Radiology: ordered.    Risk  Prescription drug management.  Decision regarding hospitalization.             Disposition  Final diagnoses:   Acute chest pain   Pulmonary edema    Acute renal failure (ARF) (HCC)   Abscess or cellulitis of scalp     Time reflects when diagnosis was documented in both MDM as applicable and the Disposition within this note       Time User Action Codes Description Comment    5/4/2024 12:09 AM Boy Guan [R07.9] Acute chest pain     5/4/2024 12:10 AM Boy Guan [J81.1] Pulmonary edema     5/4/2024 12:10 AM Boy Guan [N17.9] Acute renal failure (ARF) (HCC)     5/4/2024  1:13 PM Boy Guan [L03.811] Abscess or cellulitis of scalp           ED Disposition       ED Disposition   Admit    Condition   Stable    Date/Time   Sat May 4, 2024 0009    Comment   Case was discussed with SASHA and the patient's admission status was agreed to be Admission Status: inpatient status to the service of Dr. Goldman .               Follow-up Information    None         Patient's Medications    No medications on file       No discharge procedures on file.    PDMP Review       None            ED Provider  Electronically Signed by             Boy Guan MD  05/04/24 0362

## 2024-05-04 NOTE — ASSESSMENT & PLAN NOTE
Presented with chest pain and shortness of breath  -No home medications reported  Meeting criteria for hypertensive emergency in ED with BP of 193/120  -Nitroglycerin was administered in the ED  Blood Pressure: 157/99 after nitroglycerin        Plan:  Continue monitoring BP  Cardiology consulted, appreciate recommendations  Will trial a one time dose of Lasix 20 mg in setting of volume overload and suspected CHF exacerbation

## 2024-05-04 NOTE — H&P
This is an attestation to the progress note by Dr. Osiris Singh on 5/4/24 1:21 am. It in incorrectly labelled as progress note. It is the admission H&P    [facility] Service Attending Physician Attestation Note - Admission    I have seen and examined Jerod Worthington personally and have reviewed the medical record independently.  I have reviewed the case with the resident physician including all assessments and the plan of care for each.  I agree with the resident physician and offer the following addendum to the below statements by the resident physician:     63-year-old male with known history of stage III CKD presenting with shortness of breath.  Was noted to have CEE and CKD on labs on admission with creatinine of 7.05.  Was also noted to be hypertensive with blood pressure 183/120.  Also was noted to be volume overloaded with bilateral lower extremity edema, chest x-ray showing bilateral pleural effusion and pulmonary vascular congestion.    Patient was eval by nephrology this morning.  Started on Bumex drip.  By the time I saw the patient, he reported improvement in his breathing.  No significant complaints.    Patient also noted to be tachycardic.  Noted to be in atrial flutter with RVR.  Was eval by cardiology.  Started on metoprolol 50 every 12.  Started on Eliquis 2.5 twice daily for anticoagulation.  Follow-up echocardiogram.    Discussed in detail with Dr. Davis from cardiology and Dr. Reyes from nephrology.    For detailed history, assessment, and plan of care, please review the statements by Dr. Osiris Singh .    Thanh De La Rosa MD

## 2024-05-04 NOTE — ASSESSMENT & PLAN NOTE
Hx of CKD with ARF present on admission    Lab Results   Component Value Date    EGFR 7 05/03/2024    EGFR 8 (L) 05/03/2024    EGFR 40 (L) 06/22/2022    CREATININE 7.05 (H) 05/03/2024    CREATININE 7.25 (H) 05/03/2024    CREATININE 1.89 (H) 06/22/2022       Plan:  Monitor input and output  Repeat BMP in AM  Nephrology consult, appreciate recommendations  Avoid nephrotoxins

## 2024-05-04 NOTE — ASSESSMENT & PLAN NOTE
POA  Patient with history of stage IIIb CKD  Previously known baseline creatinine of 1.9-2     Recent Labs     05/03/24  1247 05/03/24  2154   CREATININE 7.25* 7.05*   EGFR 8* 7     Estimated Creatinine Clearance: 11.7 mL/min (A) (by C-G formula based on SCr of 7.05 mg/dL (H)).      Plan:  Consult nephrology, appreciate recommendations  -Considering the need for dialysis  CT abdomen pelvis wo contrast ordered

## 2024-05-04 NOTE — PROGRESS NOTES
[facility]  Progress Note  Name: Jerod Worthington I  MRN: [id number]  Unit/Bed#: ED-21 I Date of Admission: 5/3/2024   Date of Service: 5/4/2024 I Hospital Day: 0    Assessment/Plan   * Acute renal failure (ARF) (HCC)  Assessment & Plan  POA  Patient with history of stage IIIb CKD  Previously known baseline creatinine of 1.9-2     Recent Labs     05/03/24  1247 05/03/24  2154   CREATININE 7.25* 7.05*   EGFR 8* 7     Estimated Creatinine Clearance: 11.7 mL/min (A) (by C-G formula based on SCr of 7.05 mg/dL (H)).      Plan:  Consult nephrology, appreciate recommendations  -Considering the need for dialysis  CT abdomen pelvis wo contrast ordered      Hypertensive emergency  Assessment & Plan  History of hypertension, not currently taking any medications at home  Patient presented to ED with chest pain and shortness of breath    In the ED, Blood Pressure: (!) 193/120  -Nitroglycerin was administered  Blood Pressure: 157/99    Plan:  Continue to monitor blood pressure  - Goal of 160/100   Cardiology consulted, appreciate recommendations  PRN labetalol for SBP >180 or DBP>120        Severe obstructive sleep apnea  Assessment & Plan  Hx of BOBBY with CPAP use at home  - patient endorse nightly use  Patient was started on BiPAP for respiratory distress in ED    Plan:  Continue BiPAP over night with plans to trial off of BiPAP in AM  Continue CPAP nightly, pending success of trial off of BiPAP    Essential hypertension  Assessment & Plan  Presented with chest pain and shortness of breath  -No home medications reported  Meeting criteria for hypertensive emergency in ED with BP of 193/120  -Nitroglycerin was administered in the ED  Blood Pressure: 157/99 after nitroglycerin        Plan:  Continue monitoring BP  Cardiology consulted, appreciate recommendations  Will trial a one time dose of Lasix 20 mg in setting of volume overload and suspected CHF exacerbation    Hyperlipidemia  Assessment & Plan  No  "results found for: \"CHOLESTEROL\", \"TRIG\", \"HDL\", \"LDLCALC\"      Stage 3b chronic kidney disease (HCC)  Assessment & Plan  Hx of CKD with ARF present on admission    Lab Results   Component Value Date    EGFR 7 05/03/2024    EGFR 8 (L) 05/03/2024    EGFR 40 (L) 06/22/2022    CREATININE 7.05 (H) 05/03/2024    CREATININE 7.25 (H) 05/03/2024    CREATININE 1.89 (H) 06/22/2022       Plan:  Monitor input and output  Repeat BMP in AM  Nephrology consult, appreciate recommendations  Avoid nephrotoxins    Type 2 diabetes mellitus with hyperglycemia, with long-term current use of insulin ([facility])  Assessment & Plan  Home regimen of Lantus 20 units at lunch and at bedtime    Lab Results   Component Value Date    HGBA1C 7.8 (H) 05/03/2024     Recent Labs     05/03/24  2108   POCGLU 191*     Blood Sugar Average: Last 72 hrs:  (P) 191      Plan:  Continue home lantus  SSI  Hypoglycemic protocol             VTE Pharmacologic Prophylaxis: VTE Score: 4 Moderate Risk (Score 3-4) - Pharmacological DVT Prophylaxis Ordered: heparin.  Code Status: Level 1 - Full Code   Discussion with family: Patient declined call to .     Anticipated Length of Stay: Patient will be admitted on an inpatient basis with an anticipated length of stay of greater than 2 midnights secondary to acute respiratory failure.    Chief Complaint: shortness of breath     History of Present Illness:  Jerod Worthington is a 63 y.o. male with a PMH of HTN, HLD, BOBBY, stage IIIb CKD, and T2DM who presents with shortness of breath and chest pain.  An  service was utilized for this encounter.  The patient reports that he noticed increased swelling in his lower extremities over the last week.  He has also found it more difficult to ambulate, primarily due to shortness of breath.  He describes a sudden worsening of shortness of breath and new chest pain on the continued of 5/23/2024.  No inciting event noted.  The chest pain was centrally located and did not " "radiate.  Shortness of breath was worsened with activity.  He denies any previous episodes of similar symptoms.  He denies nausea, vomiting, abdominal pain, headaches, vision changes, or weakness.  He also notes that he has had a history of kidney disease and has had kidney stones in the past.  He denies any new urinary symptoms including difficulty urinating, painful urination, or blood in his urine.    Review of Systems:  Review of Systems   Constitutional:  Negative for fever.   Respiratory:  Positive for shortness of breath. Negative for cough.    Cardiovascular:  Positive for chest pain and leg swelling. Negative for palpitations.   Gastrointestinal:  Negative for abdominal pain, diarrhea, nausea and vomiting.   Genitourinary:  Negative for difficulty urinating, dysuria, flank pain and frequency.   Neurological:  Negative for dizziness, syncope and headaches.       Past Medical and Surgical History:   History reviewed. No pertinent past medical history.    History reviewed. No pertinent surgical history.    Meds/Allergies:  Prior to Admission medications    Not on File     I have reviewed home medications with patient personally.    Allergies: No Known Allergies    Social History:  Marital Status: Single   Patient Pre-hospital Living Situation: Home  Patient Pre-hospital Level of Mobility: walks  Patient Pre-hospital Diet Restrictions: none  Substance Use History:   Social History     Substance and Sexual Activity   Alcohol Use None     Social History     Tobacco Use   Smoking Status Not on file   Smokeless Tobacco Not on file     Social History     Substance and Sexual Activity   Drug Use Not on file       Family History:  History reviewed. No pertinent family history.    Physical Exam:     Vitals:   Blood Pressure: 157/99 (05/04/24 0230)  Pulse: (!) 134 (05/04/24 0230)  Temperature: 97.9 °F (36.6 °C) (05/03/24 2112)  Temp Source: Oral (05/03/24 2112)  Respirations: 18 (05/04/24 0230)  Height: 5' 4\" (162.6 cm) " (05/03/24 2106)  Weight - Scale: 98.9 kg (218 lb) (05/03/24 2106)  SpO2: 100 % (05/04/24 0230)    Physical Exam  Vitals reviewed.   Constitutional:       Appearance: He is ill-appearing.   HENT:      Head: Normocephalic and atraumatic.      Right Ear: External ear normal.      Left Ear: External ear normal.      Nose: Nose normal.      Mouth/Throat:      Mouth: Mucous membranes are moist.   Eyes:      General:         Right eye: No discharge.         Left eye: No discharge.   Cardiovascular:      Rate and Rhythm: Normal rate and regular rhythm.      Heart sounds: No murmur heard.  Pulmonary:      Effort: No respiratory distress.      Breath sounds: Rales present. No wheezing or rhonchi.      Comments: SpO2>90% currently on BiPAP  B/L rales on auscultation     Abdominal:      Palpations: Abdomen is soft.      Tenderness: There is no abdominal tenderness. There is no guarding or rebound.   Musculoskeletal:      Cervical back: No tenderness.      Right lower leg: Edema present.      Left lower leg: Edema present.      Comments: 2+ pitting edema of b/l lower extremities   Skin:     General: Skin is warm and dry.      Capillary Refill: Capillary refill takes less than 2 seconds.   Neurological:      Mental Status: He is alert and oriented to person, place, and time.      Motor: No weakness (5/5 strength in b/l upper and lower extremities).            Additional Data:     Lab Results:  Results from last 7 days   Lab Units 05/04/24  0233 05/03/24  2154   WBC Thousand/uL  --  6.29   HEMOGLOBIN g/dL  --  9.6*   HEMATOCRIT %  --  30.9*   PLATELETS Thousands/uL 223 233   SEGS PCT %  --  62   LYMPHO PCT %  --  25   MONO PCT %  --  7   EOS PCT %  --  4     Results from last 7 days   Lab Units 05/03/24  2154   SODIUM mmol/L 138   POTASSIUM mmol/L 5.0   CHLORIDE mmol/L 111*   CO2 mmol/L 20*   BUN mg/dL 59*   CREATININE mg/dL 7.05*   ANION GAP mmol/L 7   CALCIUM mg/dL 8.2*   ALBUMIN g/dL 3.2*   TOTAL BILIRUBIN mg/dL 0.36   ALK PHOS  U/L 147*   ALT U/L 20   AST U/L 20   GLUCOSE RANDOM mg/dL 193*         Results from last 7 days   Lab Units 05/03/24  2108   POC GLUCOSE mg/dl 191*     Results from last 7 days   Lab Units 05/03/24  1247   HEMOGLOBIN A1C % 7.8*           Lines/Drains:  Invasive Devices       Peripheral Intravenous Line  Duration             Peripheral IV 05/03/24 Left Antecubital <1 day                        Imaging: Personally reviewed the following imaging: chest xray  XR chest 2 views    (Results Pending)   CT abdomen pelvis wo contrast    (Results Pending)       EKG and Other Studies Reviewed on Admission:   EKG: Sinus Tachycardia. .    ** Please Note: This note has been constructed using a voice recognition system. **

## 2024-05-04 NOTE — ASSESSMENT & PLAN NOTE
Hx of BOBBY with CPAP use at home  - patient endorse nightly use  Patient was started on BiPAP for respiratory distress in ED    Plan:  Continue BiPAP over night with plans to trial off of BiPAP in AM  Continue CPAP nightly, pending success of trial off of BiPAP

## 2024-05-04 NOTE — CONSULTS
Consultation - Nephrology   Jerod Worthington 63 y.o. male MRN: [id number]  Unit/Bed#: ED-21 Encounter: [id number]    ASSESSMENT AND PLAN:  63 y.o. man with PMH of CKD G4 follow-up at [facility] but no labs in 2023, diabetic, obese, hypertension, BOBBY, obesity p/w SOB.  Nephrology is consulted for CEE    PLAN    #Non-Oliguric KDIGO CEE stage 3 sever on CKD G4 with proteinuria:    Etiology: Likely secondary to CRS versus progression of CKD  Baseline creatinine 1.89 mg/dL in June 2022, no labs in 2023:   Current creatinine: 6.97 mg/dL  Peak creatinine: 7.05 mg/dL  UACR 3139.6  Renal imaging : CT scan, personal interpretation no hydronephrosis  Treatment:  No urgent indication of dialysis.  Possible progression of CKD, no labs since 2022  Maintain MAP:  Over 65 mmHg if possible/avoid hypoperfusion:  Hold parameters on blood pressure medications  Avoid nephrotoxic agents such as NSAIDs, and IV contrast if possible. 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  Hypertensive on admission 169/107.  Goal less than 140/90  Recommend:  Start Bumex drip 1 mg/h  Monitor urinary output    #Anemia:  Current hemoglobin: 9.6 mg/dL  Likely secondary to CKD  Treatment:  No indication of CHIDI at this time  Transfuse for hemoglobin less than 7.0 per primary service      #DM  HbA1c 7.8  Advised to maintain a good DM control to prevent progression of CKD   Maintain healthy diet (vegetables, fruits, whole grains, nonfat or low  fat)  Weight loss  Physical activity (5 to 10 minutes to start the increase to 30 min a day)      #CHF  Fluid overload  Bumex as above    The highlighted and/or bolded points in my assessment, plan, and disposition were discussed with the primary team and they agree with those points and the plan.  Previous records were personally reviewed by me to obtain a baseline creatinine.   The images (CXR) were personally reviewed by me in PACS      HISTORY OF PRESENT ILLNESS:  Requesting Physician: Thanh De La Rosa MD  Reason for Consult: CEE on CKD    Jerod Worthington is a 63 y.o. male with PMH of CKD G4 follow-up at [facility] but no labs in 2023, diabetic, obese, hypertension, BOBBY, obesity who was admitted to [facility] after presenting with shortness of breath. A renal consultation is requested today for assistance in the management of CEE on CKD.    PAST MEDICAL HISTORY:  History reviewed. No pertinent past medical history.    PAST SURGICAL HISTORY:  History reviewed. No pertinent surgical history.    ALLERGIES:  No Known Allergies    SOCIAL HISTORY:  Social History     Substance and Sexual Activity   Alcohol Use None     Social History     Substance and Sexual Activity   Drug Use Not on file     Social History     Tobacco Use   Smoking Status Not on file   Smokeless Tobacco Not on file       FAMILY HISTORY:  History reviewed. No pertinent family history.    MEDICATIONS:    Current Facility-Administered Medications:     acetaminophen (TYLENOL) tablet 650 mg, 650 mg, Oral, Q6H PRN, Osiris Singh DO    bumetanide (BUMEX) 12.5 mg infusion 50 mL, 1 mg/hr, Intravenous, Continuous, Joselyn Reyes Bahamonde, MD    heparin (porcine) subcutaneous injection 5,000 Units, 5,000 Units, Subcutaneous, Q8H BOBBI, 5,000 Units at 05/04/24 0521 **AND** [COMPLETED] Platelet count, , , Once, Osiris Singh DO    insulin glargine (LANTUS) subcutaneous injection 20 Units 0.2 mL, 20 Units, Subcutaneous, Q12H BOBBI, Osiris Singh,  DO    insulin lispro (HumALOG/ADMELOG) 100 units/mL subcutaneous injection 1-6 Units, 1-6 Units, Subcutaneous, TID AC **AND** Fingerstick Glucose (POCT), , , TID AC, Osiris Singh, DO    labetalol (NORMODYNE) injection 10 mg, 10 mg, Intravenous, Q4H PRN, Osiris Singh, DO    sodium bicarbonate tablet 650 mg, 650 mg, Oral, BID after meals, Joselyn Reyes Bahamonde, MD  No current outpatient medications on file.    REVIEW OF SYSTEMS:  Complete 10 point review of systems were obtained and discussed in length with the patient. Complete review of systems were negative / unremarkable except mentioned in the HPI section.     Review of Systems - Psychological ROS: negative  Ophthalmic ROS: negative  ENT ROS: negative  Hematological and Lymphatic ROS: negative  Endocrine ROS: negative  Respiratory ROS: positive for - shortness of breath  Cardiovascular ROS: no chest pain or dyspnea on exertion  Gastrointestinal ROS: no abdominal pain, change in bowel habits, or black or bloody stools  Genito-Urinary ROS: no dysuria, trouble voiding, or hematuria  Musculoskeletal ROS: negative  Neurological ROS: no TIA or stroke symptoms  Dermatological ROS: negative     PHYSICAL EXAM:  Current Weight: Weight - Scale: 98.9 kg (218 lb)  First Weight: Weight - Scale: 98.9 kg (218 lb)  Vitals:    05/04/24 0700   BP: 125/95   Pulse: (!) 121   Resp: 16   Temp:    SpO2: 98%     No intake or output data in the 24 hours ending 05/04/24 1003  Wt Readings from Last 3 Encounters:   05/03/24 98.9 kg (218 lb)     Temp Readings from Last 3 Encounters:   05/03/24 97.9 °F (36.6 °C) (Oral)     BP Readings from Last 3 Encounters:   05/04/24 125/95     Pulse Readings from Last 3 Encounters:   05/04/24 (!) 121        General:  no acute distress at this time, on BiPAP  Skin:  No acute rash  Eyes:  No scleral icterus and noninjected  ENT:  mucous membranes moist  Neck:  no carotid bruits  Chest: Bilateral crackles, good respiratory effort, no use of  "accessory respiratory muscles  CVS:  Regular rate and rhythm without rub   Abdomen:  soft and nontender   Extremities: lower extremity edema  Neuro:  No gross focality  Psych:  Alert , cooperative       Invasive Devices:      Lab Results:   Results from last 7 days   Lab Units 05/04/24  0521 05/04/24  0233 05/03/24  2154 05/03/24  1247   WBC Thousand/uL  --   --  6.29  --    HEMOGLOBIN g/dL  --   --  9.6*  --    HEMATOCRIT %  --   --  30.9*  --    PLATELETS Thousands/uL  --  223 233  --    POTASSIUM mmol/L 4.7  --  5.0 5.5*   CHLORIDE mmol/L 113*  --  111* 113*   CO2 mmol/L 20*  --  20* 20*   BUN mg/dL 58*  --  59* 59*   CREATININE mg/dL 6.97*  --  7.05* 7.25*   CALCIUM mg/dL 8.1*  --  8.2* 8.4*       Other Studies:   XR chest 2 views   Final Result by Linda Aj MD (05/04 0932)      Severe pulmonary edema with small effusions.            Workstation performed: [id number]         CT abdomen pelvis wo contrast    (Results Pending)       Portions of the record may have been created with voice recognition software. Occasional wrong word or \"sound a like\" substitutions may have occurred due to the inherent limitations of voice recognition software. Read the chart carefully and recognize, using context, where substitutions have occurred.    "

## 2024-05-05 PROBLEM — L02.811 SCALP ABSCESS: Status: ACTIVE | Noted: 2024-05-05

## 2024-05-05 PROBLEM — N18.30 ACUTE RENAL FAILURE SUPERIMPOSED ON STAGE 3 CHRONIC KIDNEY DISEASE (HCC): Status: ACTIVE | Noted: 2022-06-20

## 2024-05-05 LAB
ANION GAP SERPL CALCULATED.3IONS-SCNC: 8 MMOL/L (ref 4–13)
AORTIC ROOT: 3.2 CM
APICAL FOUR CHAMBER EJECTION FRACTION: 52 %
ASCENDING AORTA: 2.8 CM
ATRIAL RATE: 140 BPM
ATRIAL RATE: 264 BPM
BSA FOR ECHO PROCEDURE: 2.03 M2
BUN SERPL-MCNC: 61 MG/DL (ref 5–25)
CALCIUM SERPL-MCNC: 8.9 MG/DL (ref 8.4–10.2)
CHLORIDE SERPL-SCNC: 109 MMOL/L (ref 96–108)
CO2 SERPL-SCNC: 20 MMOL/L (ref 21–32)
CREAT SERPL-MCNC: 7.64 MG/DL (ref 0.6–1.3)
ERYTHROCYTE [DISTWIDTH] IN BLOOD BY AUTOMATED COUNT: 14.2 % (ref 11.6–15.1)
FRACTIONAL SHORTENING: 31 (ref 28–44)
GFR SERPL CREATININE-BSD FRML MDRD: 6 ML/MIN/1.73SQ M
GLUCOSE SERPL-MCNC: 109 MG/DL (ref 65–140)
GLUCOSE SERPL-MCNC: 126 MG/DL (ref 65–140)
GLUCOSE SERPL-MCNC: 73 MG/DL (ref 65–140)
GLUCOSE SERPL-MCNC: 89 MG/DL (ref 65–140)
GLUCOSE SERPL-MCNC: 89 MG/DL (ref 65–140)
HCT VFR BLD AUTO: 33.4 % (ref 36.5–49.3)
HGB BLD-MCNC: 10.4 G/DL (ref 12–17)
INTERVENTRICULAR SEPTUM IN DIASTOLE (PARASTERNAL SHORT AXIS VIEW): 1.2 CM
INTERVENTRICULAR SEPTUM: 1.2 CM (ref 0.6–1.1)
LAAS-AP2: 14.8 CM2
LAAS-AP4: 20.4 CM2
LEFT ATRIUM AREA SYSTOLE SINGLE PLANE A4C: 20.8 CM2
LEFT ATRIUM SIZE: 3.5 CM
LEFT ATRIUM VOLUME (MOD BIPLANE): 52 ML
LEFT ATRIUM VOLUME INDEX (MOD BIPLANE): 25.6 ML/M2
LEFT INTERNAL DIMENSION IN SYSTOLE: 3.1 CM (ref 2.1–4)
LEFT VENTRICULAR INTERNAL DIMENSION IN DIASTOLE: 4.5 CM (ref 3.5–6)
LEFT VENTRICULAR POSTERIOR WALL IN END DIASTOLE: 1.3 CM
LEFT VENTRICULAR STROKE VOLUME: 54 ML
LVSV (TEICH): 54 ML
MCH RBC QN AUTO: 30.9 PG (ref 26.8–34.3)
MCHC RBC AUTO-ENTMCNC: 31.1 G/DL (ref 31.4–37.4)
MCV RBC AUTO: 99 FL (ref 82–98)
PHOSPHATE SERPL-MCNC: 4.9 MG/DL (ref 2.3–4.1)
PLATELET # BLD AUTO: 259 THOUSANDS/UL (ref 149–390)
PMV BLD AUTO: 9.2 FL (ref 8.9–12.7)
POTASSIUM SERPL-SCNC: 5.4 MMOL/L (ref 3.5–5.3)
QRS AXIS: -44 DEGREES
QRS AXIS: -58 DEGREES
QRSD INTERVAL: 110 MS
QRSD INTERVAL: 116 MS
QT INTERVAL: 354 MS
QT INTERVAL: 392 MS
QTC INTERVAL: 474 MS
QTC INTERVAL: 540 MS
RBC # BLD AUTO: 3.37 MILLION/UL (ref 3.88–5.62)
RIGHT ATRIUM AREA SYSTOLE A4C: 13.6 CM2
RIGHT VENTRICLE ID DIMENSION: 3 CM
SL CV LEFT ATRIUM LENGTH A2C: 4.7 CM
SL CV LV EF: 60
SL CV PED ECHO LEFT VENTRICLE DIASTOLIC VOLUME (MOD BIPLANE) 2D: 91 ML
SL CV PED ECHO LEFT VENTRICLE SYSTOLIC VOLUME (MOD BIPLANE) 2D: 37 ML
SODIUM SERPL-SCNC: 137 MMOL/L (ref 135–147)
T WAVE AXIS: 55 DEGREES
T WAVE AXIS: 86 DEGREES
TR MAX PG: 25 MMHG
TR PEAK VELOCITY: 2.5 M/S
TRICUSPID ANNULAR PLANE SYSTOLIC EXCURSION: 2 CM
TRICUSPID VALVE PEAK REGURGITATION VELOCITY: 2.49 M/S
VENTRICULAR RATE: 140 BPM
VENTRICULAR RATE: 88 BPM
WBC # BLD AUTO: 6 THOUSAND/UL (ref 4.31–10.16)

## 2024-05-05 PROCEDURE — 85027 COMPLETE CBC AUTOMATED: CPT | Performed by: STUDENT IN AN ORGANIZED HEALTH CARE EDUCATION/TRAINING PROGRAM

## 2024-05-05 PROCEDURE — 94760 N-INVAS EAR/PLS OXIMETRY 1: CPT

## 2024-05-05 PROCEDURE — 99232 SBSQ HOSP IP/OBS MODERATE 35: CPT | Performed by: INTERNAL MEDICINE

## 2024-05-05 PROCEDURE — 94660 CPAP INITIATION&MGMT: CPT

## 2024-05-05 PROCEDURE — 93010 ELECTROCARDIOGRAM REPORT: CPT | Performed by: INTERNAL MEDICINE

## 2024-05-05 PROCEDURE — 84100 ASSAY OF PHOSPHORUS: CPT | Performed by: STUDENT IN AN ORGANIZED HEALTH CARE EDUCATION/TRAINING PROGRAM

## 2024-05-05 PROCEDURE — 80048 BASIC METABOLIC PNL TOTAL CA: CPT | Performed by: STUDENT IN AN ORGANIZED HEALTH CARE EDUCATION/TRAINING PROGRAM

## 2024-05-05 PROCEDURE — 93005 ELECTROCARDIOGRAM TRACING: CPT

## 2024-05-05 PROCEDURE — 82948 REAGENT STRIP/BLOOD GLUCOSE: CPT

## 2024-05-05 PROCEDURE — 99232 SBSQ HOSP IP/OBS MODERATE 35: CPT | Performed by: STUDENT IN AN ORGANIZED HEALTH CARE EDUCATION/TRAINING PROGRAM

## 2024-05-05 RX ORDER — METOPROLOL TARTRATE 100 MG/1
100 TABLET ORAL EVERY 12 HOURS SCHEDULED
Status: DISCONTINUED | OUTPATIENT
Start: 2024-05-05 | End: 2024-05-07

## 2024-05-05 RX ORDER — BUMETANIDE 0.25 MG/ML
2 INJECTION INTRAMUSCULAR; INTRAVENOUS CONTINUOUS
Status: DISPENSED | OUTPATIENT
Start: 2024-05-05 | End: 2024-05-06

## 2024-05-05 RX ORDER — NIFEDIPINE 30 MG/1
60 TABLET, EXTENDED RELEASE ORAL DAILY
Status: DISCONTINUED | OUTPATIENT
Start: 2024-05-06 | End: 2024-05-06

## 2024-05-05 RX ADMIN — ACETAMINOPHEN 650 MG: 325 TABLET, FILM COATED ORAL at 07:53

## 2024-05-05 RX ADMIN — Medication 1 MG/HR: at 02:48

## 2024-05-05 RX ADMIN — Medication 2 MG/HR: at 23:17

## 2024-05-05 RX ADMIN — Medication 2 MG/HR: at 13:51

## 2024-05-05 RX ADMIN — INSULIN GLARGINE 20 UNITS: 100 INJECTION, SOLUTION SUBCUTANEOUS at 21:21

## 2024-05-05 RX ADMIN — CEPHALEXIN 250 MG: 250 CAPSULE ORAL at 21:21

## 2024-05-05 RX ADMIN — METOPROLOL TARTRATE 50 MG: 50 TABLET, FILM COATED ORAL at 09:21

## 2024-05-05 RX ADMIN — SODIUM BICARBONATE 650 MG: 650 TABLET ORAL at 09:21

## 2024-05-05 RX ADMIN — SODIUM ZIRCONIUM CYCLOSILICATE 10 G: 10 POWDER, FOR SUSPENSION ORAL at 11:44

## 2024-05-05 RX ADMIN — APIXABAN 2.5 MG: 2.5 TABLET, FILM COATED ORAL at 17:35

## 2024-05-05 RX ADMIN — CEPHALEXIN 250 MG: 250 CAPSULE ORAL at 09:21

## 2024-05-05 RX ADMIN — APIXABAN 2.5 MG: 2.5 TABLET, FILM COATED ORAL at 09:21

## 2024-05-05 RX ADMIN — SODIUM BICARBONATE 650 MG: 650 TABLET ORAL at 17:35

## 2024-05-05 RX ADMIN — INSULIN GLARGINE 20 UNITS: 100 INJECTION, SOLUTION SUBCUTANEOUS at 09:21

## 2024-05-05 RX ADMIN — Medication 2 MG/HR: at 19:40

## 2024-05-05 RX ADMIN — Medication 2 MG/HR: at 10:24

## 2024-05-05 RX ADMIN — METOPROLOL TARTRATE 100 MG: 100 TABLET, FILM COATED ORAL at 21:21

## 2024-05-05 NOTE — ASSESSMENT & PLAN NOTE
POA  Patient with history of stage IIIb CKD  Previously known baseline creatinine of 1.9-2     Recent Labs     05/03/24  2154 05/04/24  0521 05/05/24  0920   CREATININE 7.05* 6.97* 7.64*   EGFR 7 7 6       Estimated Creatinine Clearance: 10.8 mL/min (A) (by C-G formula based on SCr of 7.64 mg/dL (H)).    Creatinine trending up.  Patient currently on Bumex drip.  Urine output also low.  Around 600 yesterday.  Bumex drip increased today to 2 mg/h per nephrology.  If no improvement in creatinine continues to worsen, might need to consider dialysis.

## 2024-05-05 NOTE — PROGRESS NOTES
"Cardiology Progress Note - Jerod Worthington 63 y.o. male MRN: [id number]    Unit/Bed#: S -01 Encounter: [id number]     utilized Angel, his  #[id number]    Assessment:  Principal Problem:    Acute renal failure (ARF) ([facility])  Active Problems:    Severe obstructive sleep apnea    Type 2 diabetes mellitus with hyperglycemia, with long-term current use of insulin ([facility])    Stage 3b chronic kidney disease (HCC)    Essential hypertension    Hypertensive emergency      Plan:  CEE  on CKD 4 continue Bumex drip per nephrology  BMP was just drawn at 0 830  2D echocardiogram ordered and is pending  New Onset Atrial flutter - Eliquis started yesterday.  On metoprolol tartrate 50 mg twice daily.  Will increase to 100 mg twice daily blood pressure still elevated so patient should be able to tolerate the increased dose    Subjective:   Patient seen and examined.  No significant events overnight. Denies chest pain, pnd, orthopnea, denies abdominal pain, nausea    Objective:     Vitals: Blood pressure 145/83, pulse (!) 122, temperature 97.8 °F (36.6 °C), resp. rate 17, height 5' 4\" (1.626 m), weight 98.9 kg (218 lb 0.6 oz), SpO2 97%., Body mass index is 37.43 kg/m².,   Orthostatic Blood Pressures      Flowsheet Row Most Recent Value   Blood Pressure 145/83 filed at 05/05/2024 0743   Patient Position - Orthostatic VS Lying filed at 05/04/2024 2230              Intake/Output Summary (Last 24 hours) at 5/5/2024 0952  Last data filed at 5/5/2024 0701  Gross per 24 hour   Intake --   Output 1100 ml   Net -1100 ml       No significant arrhythmias seen on telemetry review. NSR no significant arrhythmias    Physical Exam:    Physical Exam  Vitals reviewed.   Constitutional:       Appearance: Normal appearance.   HENT:      Head: Normocephalic and atraumatic.      Mouth/Throat:      Mouth: Mucous membranes are dry.   Pulmonary:      Effort: Pulmonary effort is normal.      Breath sounds: Normal breath sounds.   Abdominal:      " "General: Bowel sounds are normal.      Palpations: Abdomen is soft.   Musculoskeletal:      Right lower leg: Edema present.      Left lower leg: Edema present.   Skin:     General: Skin is warm and dry.      Capillary Refill: Capillary refill takes 2 to 3 seconds.   Neurological:      Mental Status: He is alert and oriented to person, place, and time.   Psychiatric:         Behavior: Behavior normal.      @PECMarion General HospitalO@   Labs & Results:    No results found for: \"CKTOTAL\", \"CKMB\", \"CKMBINDEX\", \"TROPONINI\"    Lab Results   Component Value Date    CALCIUM 8.1 (L) 05/04/2024    K 4.7 05/04/2024    CO2 20 (L) 05/04/2024     (H) 05/04/2024    BUN 58 (H) 05/04/2024    CREATININE 6.97 (H) 05/04/2024       Lab Results   Component Value Date    WBC 6.00 05/05/2024    HGB 10.4 (L) 05/05/2024    HCT 33.4 (L) 05/05/2024    MCV 99 (H) 05/05/2024     05/05/2024             Lab Results   Component Value Date    ALT 18 05/04/2024    AST 15 05/04/2024    ALKPHOS 138 (H) 05/04/2024       Tele:   not on when I saw pt   Ordered tele now.    In atrial flutter with rate 122.        "

## 2024-05-05 NOTE — PLAN OF CARE
Problem: PAIN - ADULT  Goal: Verbalizes/displays adequate comfort level or baseline comfort level  Description: Interventions:  - Encourage patient to monitor pain and request assistance  - Assess pain using appropriate pain scale  - Administer analgesics based on type and severity of pain and evaluate response  - Implement non-pharmacological measures as appropriate and evaluate response  - Consider cultural and social influences on pain and pain management  - Notify physician/advanced practitioner if interventions unsuccessful or patient reports new pain  Outcome: Progressing     Problem: INFECTION - ADULT  Goal: Absence or prevention of progression during hospitalization  Description: INTERVENTIONS:  - Assess and monitor for signs and symptoms of infection  - Monitor lab/diagnostic results  - Monitor all insertion sites, i.e. indwelling lines, tubes, and drains  - Monitor endotracheal if appropriate and nasal secretions for changes in amount and color  - Cashion appropriate cooling/warming therapies per order  - Administer medications as ordered  - Instruct and encourage patient and family to use good hand hygiene technique  - Identify and instruct in appropriate isolation precautions for identified infection/condition  Outcome: Progressing     Problem: SAFETY ADULT  Goal: Patient will remain free of falls  Description: INTERVENTIONS:  - Educate patient/family on patient safety including physical limitations  - Instruct patient to call for assistance with activity   - Consult OT/PT to assist with strengthening/mobility   - Keep Call bell within reach  - Keep bed low and locked with side rails adjusted as appropriate  - Keep care items and personal belongings within reach  - Initiate and maintain comfort rounds  - Make Fall Risk Sign visible to staff  - Apply yellow socks and bracelet for high fall risk patients  - Consider moving patient to room near nurses station  Outcome: Progressing

## 2024-05-05 NOTE — PROGRESS NOTES
NEPHROLOGY PROGRESS NOTE   Jerod Worthington 63 y.o. male MRN: [id number]  Unit/Bed#: S -01 Encounter: [id number]  Reason for Consult: CEE on CKD     ASSESSMENT AND PLAN:  63 y.o. man with PMH of CKD G4 follow-up at [facility] but no labs in 2023, diabetic, obese, hypertension, BOBBY, obesity p/w SOB.  Nephrology is consulted for CEE     PLAN     #Non-Oliguric KDIGO CEE stage 3 sever on CKD G4 with proteinuria:    Etiology: Likely secondary to CRS versus progression of CKD  Baseline creatinine 1.89 mg/dL in June 2022, no labs in 2023:   Current creatinine:7.64mg/dL , trending up  Peak creatinine: trending   UACR 3139.6mg/dL  Renal imaging : CT scan, personal interpretation no hydronephrosis  Treatment:  No indication of hemodialysis. I anticipate patient will require dialysis if worsening kidney function   Possible progression of CKD, no labs since 2022  Maintain MAP:  Over 65 mmHg if possible/avoid hypoperfusion:  Hold parameters on blood pressure medications  Avoid nephrotoxic agents such as NSAIDs, and IV contrast if possible. 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 in the settings of hypertension     # Nephrotic syndrome  UACR 3139.6  Serum albumin 3.1  Edema  Etiology: Likely secondary to diabetic glomerulopathy  Will send PLA2R   Increase Bumex as above         #Acid-base Disorder  serum HCO3 20mmol/L  Low bicarb in the settings of CKD  No sodium bicarb at this time to avoid sodium load      #Volume status/hypertension:  Volume: Fluid overload with pleural effusion and lower extremity edema  Blood pressure: Borderline hypertension /83mmhg, goal less than 140/90  Recommend:  Increase Bumex to 2mg/h  Monitor urinary output     #Anemia:  Current hemoglobin: 10.4mg/dL  Likely secondary to CKD  Treatment:  No indication of CHIDI at this time  Transfuse for hemoglobin less than 7.0  per primary service       #DM  HbA1c 7.8  Advised to maintain a good DM control to prevent progression of CKD   Maintain healthy diet (vegetables, fruits, whole grains, nonfat or low fat)  Weight loss  Physical activity (5 to 10 minutes to start the increase to 30 min a day)        #CHF  Fluid overload  Increase Bumex as above      #Borderline hyperkalemia   Potassium 5.4mEq/L  Loklema  Today  Low potassium diet      SUBJECTIVE:  Patient seen and examined at bedside. No chest pain, shortness of breath,    OBJECTIVE:  Current Weight: Weight - Scale: 98.9 kg (218 lb 0.6 oz)  Vitals:    05/05/24 0743   BP: 145/83   Pulse: (!) 122   Resp: 17   Temp: 97.8 °F (36.6 °C)   SpO2: 97%       Intake/Output Summary (Last 24 hours) at 5/5/2024 1004  Last data filed at 5/5/2024 0701  Gross per 24 hour   Intake --   Output 1100 ml   Net -1100 ml     Wt Readings from Last 3 Encounters:   05/04/24 98.9 kg (218 lb 0.6 oz)     Temp Readings from Last 3 Encounters:   05/05/24 97.8 °F (36.6 °C)     BP Readings from Last 3 Encounters:   05/05/24 145/83     Pulse Readings from Last 3 Encounters:   05/05/24 (!) 122        General:  no acute distress at this time  Skin:  No acute rash  Eyes:  No scleral icterus and noninjected  ENT:  mucous membranes moist  Neck:  no carotid bruits  Chest:  Clear to auscultation percussion, good respiratory effort, no use of accessory respiratory muscles  CVS:  Regular rate and rhythm without rub   Abdomen:  soft and nontender   Extremities: lower extremity edema  Neuro:  No gross focality  Psych:  Alert , cooperative       Medications:    Current Facility-Administered Medications:     acetaminophen (TYLENOL) tablet 650 mg, 650 mg, Oral, Q6H PRN, Osiris Singh DO, 650 mg at 05/05/24 0753    apixaban (ELIQUIS) tablet 2.5 mg, 2.5 mg, Oral, BID, SAUL Galicia, 2.5 mg at 05/05/24 0921    bumetanide (BUMEX) 12.5 mg infusion 50 mL, 2 mg/hr, Intravenous, Continuous, Joselyn Reyes Bahamonde, MD     cephalexin (KEFLEX) capsule 250 mg, 250 mg, Oral, Q12H BOBBI, Boy Guan MD, 250 mg at 05/05/24 0921    insulin glargine (LANTUS) subcutaneous injection 20 Units 0.2 mL, 20 Units, Subcutaneous, Q12H BOBBI, Osiris Singh DO, 20 Units at 05/05/24 0921    insulin lispro (HumALOG/ADMELOG) 100 units/mL subcutaneous injection 1-6 Units, 1-6 Units, Subcutaneous, TID AC **AND** Fingerstick Glucose (POCT), , , TID AC, Osiris Singh DO    labetalol (NORMODYNE) injection 10 mg, 10 mg, Intravenous, Q4H PRN, Osiris Singh DO, 10 mg at 05/04/24 2233    metoprolol tartrate (LOPRESSOR) tablet 50 mg, 50 mg, Oral, Q12H BOBBI, SAUL Galicia, 50 mg at 05/05/24 0921    sodium bicarbonate tablet 650 mg, 650 mg, Oral, BID after meals, Joselyn Reyes Bahamonde, MD, 650 mg at 05/05/24 0921    Laboratory Results:  Results from last 7 days   Lab Units 05/05/24  0920 05/04/24  0521 05/04/24  0233 05/03/24  2154 05/03/24  1247   WBC Thousand/uL 6.00  --   --  6.29  --    HEMOGLOBIN g/dL 10.4*  --   --  9.6*  --    HEMATOCRIT % 33.4*  --   --  30.9*  --    PLATELETS Thousands/uL 259  --  223 233  --    SODIUM mmol/L 137 140  --  138 142   POTASSIUM mmol/L 5.4* 4.7  --  5.0 5.5*   CHLORIDE mmol/L 109* 113*  --  111* 113*   CO2 mmol/L 20* 20*  --  20* 20*   BUN mg/dL 61* 58*  --  59* 59*   CREATININE mg/dL 7.64* 6.97*  --  7.05* 7.25*   CALCIUM mg/dL 8.9 8.1*  --  8.2* 8.4*   PHOSPHORUS mg/dL 4.9*  --   --   --   --        CT abdomen pelvis wo contrast   Final Result by Bradley Landon Kocher, MD (05/05 0832)      1. No obstructive uropathy. Bilateral nonobstructing renal calculi measuring up to 8 mm at the lower pole right kidney.   2. Small bilateral pleural effusions with adjacent compressive atelectasis.      Workstation performed: [id number]         XR chest 2 views   Final Result by Linda Aj MD (05/04 0932)      Severe pulmonary edema with small effusions.            Workstation performed:  "[id number]             Portions of the record may have been created with voice recognition software. Occasional wrong word or \"sound a like\" substitutions may have occurred due to the inherent limitations of voice recognition software. Read the chart carefully and recognize, using context, where substitutions have occurred.    "

## 2024-05-05 NOTE — ASSESSMENT & PLAN NOTE
Home regimen of Lantus 20 units at lunch and at bedtime    Lab Results   Component Value Date    HGBA1C 7.8 (H) 05/03/2024     Recent Labs     05/04/24  1057 05/04/24  1622 05/04/24 2017 05/05/24  0739   POCGLU 143* 122 136 73       Blood Sugar Average: Last 72 hrs:  (P) 128.9887901015015649      Blood glucose control.  Continue Lantus with sliding scale.

## 2024-05-05 NOTE — ASSESSMENT & PLAN NOTE
Initially elevated on admission.  Now improved.  Continue metoprolol, patient on Bumex drip, as needed labetalol.  If continues to be high, will consider adding amlodipine.

## 2024-05-05 NOTE — PROGRESS NOTES
[facility]  Progress Note  Name: Jerod Worthington I  MRN: [id number]  Unit/Bed#: S -01 I Date of Admission: 5/3/2024   Date of Service: 5/5/2024 I Hospital Day: 1    Assessment/Plan   * Acute renal failure superimposed on stage 3 chronic kidney disease (HCC)  Assessment & Plan  POA  Patient with history of stage IIIb CKD  Previously known baseline creatinine of 1.9-2     Recent Labs     05/03/24  2154 05/04/24  0521 05/05/24  0920   CREATININE 7.05* 6.97* 7.64*   EGFR 7 7 6       Estimated Creatinine Clearance: 10.8 mL/min (A) (by C-G formula based on SCr of 7.64 mg/dL (H)).    Creatinine trending up.  Patient currently on Bumex drip.  Urine output also low.  Around 600 yesterday.  Bumex drip increased today to 2 mg/h per nephrology.  If no improvement in creatinine continues to worsen, might need to consider dialysis.      Scalp abscess  Assessment & Plan  Patient was noted to have small scalp abscess on admission in ED.  Was drained.  Continue Keflex.    Hypertensive emergency  Assessment & Plan  Initially elevated on admission.  Now improved.  Continue metoprolol, patient on Bumex drip, as needed labetalol.  If continues to be high, will consider adding amlodipine.      Type 2 diabetes mellitus with hyperglycemia, with long-term current use of insulin ([facility])  Assessment & Plan  Home regimen of Lantus 20 units at lunch and at bedtime    Lab Results   Component Value Date    HGBA1C 7.8 (H) 05/03/2024     Recent Labs     05/04/24  1057 05/04/24  1622 05/04/24  2017 05/05/24  0739   POCGLU 143* 122 136 73       Blood Sugar Average: Last 72 hrs:  (P) 128.0862441312881914      Blood glucose control.  Continue Lantus with sliding scale.             VTE Pharmacologic Prophylaxis:   Pharmacologic: Apixaban (Eliquis)  Mechanical VTE Prophylaxis in Place: Yes    Patient Centered Rounds: I have performed bedside rounds with nursing staff today.    Discussions with Specialists or Other Care Team  Provider: nephro cardio    Education and Discussions with Family / Patient: pt    Time Spent for Care: 30 minutes.  More than 50% of total time spent on counseling and coordination of care as described above.    Current Length of Stay: 1 day(s)    Current Patient Status: Inpatient   Certification Statement: The patient will continue to require additional inpatient hospital stay due to above    Discharge Plan:     Code Status: Level 1 - Full Code      Subjective:   Pt seen and examined by me in morning.  Denied any specific complaints.  Although patient's nurse reported that he was complaining of pain in his legs this morning    Objective:     Vitals:   Temp (24hrs), Av °F (36.7 °C), Min:97.8 °F (36.6 °C), Max:98.1 °F (36.7 °C)    Temp:  [97.8 °F (36.6 °C)-98.1 °F (36.7 °C)] 97.8 °F (36.6 °C)  HR:  [] 94  Resp:  [16-18] 17  BP: (145-190)/() 145/83  SpO2:  [95 %-100 %] 98 %  Body mass index is 37.43 kg/m².     Input and Output Summary (last 24 hours):       Intake/Output Summary (Last 24 hours) at 2024 1119  Last data filed at 2024 1030  Gross per 24 hour   Intake 120 ml   Output 1775 ml   Net -1655 ml       Physical Exam:     Physical Exam    Constitutional: Pt appears comfortable. Not in any acute distress.  Cardiovascular: Tachycardia, irregular rhythm, normal heart sounds.  No murmur heard.  Bilateral lower extremity pitting edema, no significant improvement from yesterday  Pulmonary/Chest: Effort normal, air entry b/l equal. No respiratory distress. Pt has no wheezes or crackles.   Abdominal: Soft. Non-distended, Non-tender. Bowel sounds are normal.   Neurological: awake, alert. Moving all extremities spontaneously.  Psychiatric: normal mood and affect.      Additional Data:     Labs:    Results from last 7 days   Lab Units 24  0920 24  0233 24  2154   WBC Thousand/uL 6.00  --  6.29   HEMOGLOBIN g/dL 10.4*  --  9.6*   HEMATOCRIT % 33.4*  --  30.9*   PLATELETS Thousands/uL  259   < > 233   SEGS PCT %  --   --  62   LYMPHO PCT %  --   --  25   MONO PCT %  --   --  7   EOS PCT %  --   --  4    < > = values in this interval not displayed.     Results from last 7 days   Lab Units 05/05/24  0920 05/04/24  0521   SODIUM mmol/L 137 140   POTASSIUM mmol/L 5.4* 4.7   CHLORIDE mmol/L 109* 113*   CO2 mmol/L 20* 20*   BUN mg/dL 61* 58*   CREATININE mg/dL 7.64* 6.97*   ANION GAP mmol/L 8 7   CALCIUM mg/dL 8.9 8.1*   ALBUMIN g/dL  --  3.1*   TOTAL BILIRUBIN mg/dL  --  0.43   ALK PHOS U/L  --  138*   ALT U/L  --  18   AST U/L  --  15   GLUCOSE RANDOM mg/dL 89 117         Results from last 7 days   Lab Units 05/05/24  1056 05/05/24  0739 05/04/24  2017 05/04/24  1622 05/04/24  1057 05/04/24  0728 05/03/24  2108   POC GLUCOSE mg/dl 109 73 136 122 143* 108 191*     Results from last 7 days   Lab Units 05/03/24  1247   HEMOGLOBIN A1C % 7.8*               * I Have Reviewed All Lab Data Listed Above.  * Additional Pertinent Lab Tests Reviewed: All Labs For Current Hospital Admission Reviewed    Imaging:    Imaging Reports Reviewed Today Include:   Imaging Personally Reviewed by Myself Includes:      Recent Cultures (last 7 days):           Last 24 Hours Medication List:   Current Facility-Administered Medications   Medication Dose Route Frequency Provider Last Rate    acetaminophen  650 mg Oral Q6H PRN Osiris Singh DO      apixaban  2.5 mg Oral BID SAUL Galicia      bumetanide (BUMEX) 12.5 mg infusion 50 mL  2 mg/hr Intravenous Continuous Joselyn Reyes Bahamonde, MD 2 mg/hr (05/05/24 1024)    cephalexin  250 mg Oral Q12H [facility] Boy Guan MD      insulin glargine  20 Units Subcutaneous Q12H [facility] Osiris Singh DO      insulin lispro  1-6 Units Subcutaneous TID AC Osiris Singh DO      labetalol  10 mg Intravenous Q4H PRN Osiris Marta Lipshaw, DO      metoprolol tartrate  100 mg Oral Q12H BOBBI SAUL Galicia      sodium bicarbonate  650 mg Oral BID after meals Laura  Reyes Bahamonde, MD      Sodium Zirconium Cyclosilicate  10 g Oral Once Joselyn Reyes Bahamonde, MD          Today, Patient Was Seen By: Thanh De La Rosa MD    ** Please Note: Dictation voice to text software may have been used in the creation of this document. **

## 2024-05-06 ENCOUNTER — APPOINTMENT (INPATIENT)
Dept: CT IMAGING | Facility: HOSPITAL | Age: 64
DRG: 469 | End: 2024-05-06
Payer: MEDICARE

## 2024-05-06 LAB
ANION GAP SERPL CALCULATED.3IONS-SCNC: 10 MMOL/L (ref 4–13)
ANION GAP SERPL CALCULATED.3IONS-SCNC: 9 MMOL/L (ref 4–13)
BUN SERPL-MCNC: 66 MG/DL (ref 5–25)
BUN SERPL-MCNC: 71 MG/DL (ref 5–25)
CALCIUM SERPL-MCNC: 8.5 MG/DL (ref 8.4–10.2)
CALCIUM SERPL-MCNC: 8.5 MG/DL (ref 8.4–10.2)
CHLORIDE SERPL-SCNC: 106 MMOL/L (ref 96–108)
CHLORIDE SERPL-SCNC: 107 MMOL/L (ref 96–108)
CO2 SERPL-SCNC: 20 MMOL/L (ref 21–32)
CO2 SERPL-SCNC: 23 MMOL/L (ref 21–32)
CREAT SERPL-MCNC: 7.91 MG/DL (ref 0.6–1.3)
CREAT SERPL-MCNC: 8.25 MG/DL (ref 0.6–1.3)
GFR SERPL CREATININE-BSD FRML MDRD: 6 ML/MIN/1.73SQ M
GFR SERPL CREATININE-BSD FRML MDRD: 6 ML/MIN/1.73SQ M
GLUCOSE SERPL-MCNC: 136 MG/DL (ref 65–140)
GLUCOSE SERPL-MCNC: 156 MG/DL (ref 65–140)
GLUCOSE SERPL-MCNC: 168 MG/DL (ref 65–140)
GLUCOSE SERPL-MCNC: 42 MG/DL (ref 65–140)
GLUCOSE SERPL-MCNC: 63 MG/DL (ref 65–140)
GLUCOSE SERPL-MCNC: 72 MG/DL (ref 65–140)
MAGNESIUM SERPL-MCNC: 1.6 MG/DL (ref 1.9–2.7)
MAGNESIUM SERPL-MCNC: 2.7 MG/DL (ref 1.9–2.7)
POTASSIUM SERPL-SCNC: 4.7 MMOL/L (ref 3.5–5.3)
POTASSIUM SERPL-SCNC: 5.5 MMOL/L (ref 3.5–5.3)
SODIUM SERPL-SCNC: 136 MMOL/L (ref 135–147)
SODIUM SERPL-SCNC: 139 MMOL/L (ref 135–147)

## 2024-05-06 PROCEDURE — 99232 SBSQ HOSP IP/OBS MODERATE 35: CPT | Performed by: INTERNAL MEDICINE

## 2024-05-06 PROCEDURE — 70450 CT HEAD/BRAIN W/O DYE: CPT

## 2024-05-06 PROCEDURE — 83735 ASSAY OF MAGNESIUM: CPT | Performed by: INTERNAL MEDICINE

## 2024-05-06 PROCEDURE — 80048 BASIC METABOLIC PNL TOTAL CA: CPT | Performed by: INTERNAL MEDICINE

## 2024-05-06 PROCEDURE — 94760 N-INVAS EAR/PLS OXIMETRY 1: CPT

## 2024-05-06 PROCEDURE — 97166 OT EVAL MOD COMPLEX 45 MIN: CPT

## 2024-05-06 PROCEDURE — 83735 ASSAY OF MAGNESIUM: CPT

## 2024-05-06 PROCEDURE — 82948 REAGENT STRIP/BLOOD GLUCOSE: CPT

## 2024-05-06 PROCEDURE — 99233 SBSQ HOSP IP/OBS HIGH 50: CPT | Performed by: INTERNAL MEDICINE

## 2024-05-06 PROCEDURE — 83516 IMMUNOASSAY NONANTIBODY: CPT | Performed by: STUDENT IN AN ORGANIZED HEALTH CARE EDUCATION/TRAINING PROGRAM

## 2024-05-06 PROCEDURE — 94660 CPAP INITIATION&MGMT: CPT

## 2024-05-06 PROCEDURE — 97163 PT EVAL HIGH COMPLEX 45 MIN: CPT

## 2024-05-06 PROCEDURE — 80048 BASIC METABOLIC PNL TOTAL CA: CPT

## 2024-05-06 RX ORDER — INSULIN GLARGINE 100 [IU]/ML
10 INJECTION, SOLUTION SUBCUTANEOUS EVERY 12 HOURS SCHEDULED
Status: DISCONTINUED | OUTPATIENT
Start: 2024-05-06 | End: 2024-05-06

## 2024-05-06 RX ORDER — SODIUM BICARBONATE 650 MG/1
325 TABLET ORAL
Status: DISCONTINUED | OUTPATIENT
Start: 2024-05-06 | End: 2024-05-08

## 2024-05-06 RX ORDER — DILTIAZEM HYDROCHLORIDE 90 MG/1
90 CAPSULE, EXTENDED RELEASE ORAL EVERY 12 HOURS SCHEDULED
Status: DISCONTINUED | OUTPATIENT
Start: 2024-05-06 | End: 2024-05-07

## 2024-05-06 RX ORDER — BUMETANIDE 0.25 MG/ML
1 INJECTION INTRAMUSCULAR; INTRAVENOUS CONTINUOUS
Status: DISPENSED | OUTPATIENT
Start: 2024-05-06 | End: 2024-05-07

## 2024-05-06 RX ORDER — MAGNESIUM SULFATE HEPTAHYDRATE 40 MG/ML
4 INJECTION, SOLUTION INTRAVENOUS ONCE
Status: COMPLETED | OUTPATIENT
Start: 2024-05-06 | End: 2024-05-06

## 2024-05-06 RX ADMIN — SODIUM BICARBONATE 650 MG: 650 TABLET ORAL at 09:48

## 2024-05-06 RX ADMIN — NIFEDIPINE 60 MG: 30 TABLET, FILM COATED, EXTENDED RELEASE ORAL at 09:47

## 2024-05-06 RX ADMIN — APIXABAN 5 MG: 5 TABLET, FILM COATED ORAL at 17:11

## 2024-05-06 RX ADMIN — CEPHALEXIN 250 MG: 250 CAPSULE ORAL at 09:47

## 2024-05-06 RX ADMIN — METOPROLOL TARTRATE 100 MG: 100 TABLET, FILM COATED ORAL at 21:07

## 2024-05-06 RX ADMIN — DILTIAZEM HYDROCHLORIDE 90 MG: 90 CAPSULE, EXTENDED RELEASE ORAL at 12:18

## 2024-05-06 RX ADMIN — Medication 1 MG/HR: at 15:01

## 2024-05-06 RX ADMIN — INSULIN LISPRO 1 UNITS: 100 INJECTION, SOLUTION INTRAVENOUS; SUBCUTANEOUS at 12:18

## 2024-05-06 RX ADMIN — SODIUM BICARBONATE 650 MG TABLET 325 MG: at 17:11

## 2024-05-06 RX ADMIN — CEPHALEXIN 250 MG: 250 CAPSULE ORAL at 21:17

## 2024-05-06 RX ADMIN — MAGNESIUM SULFATE HEPTAHYDRATE 4 G: 40 INJECTION, SOLUTION INTRAVENOUS at 09:47

## 2024-05-06 RX ADMIN — APIXABAN 2.5 MG: 2.5 TABLET, FILM COATED ORAL at 09:48

## 2024-05-06 RX ADMIN — Medication 2 MG/HR: at 05:28

## 2024-05-06 RX ADMIN — METOPROLOL TARTRATE 100 MG: 100 TABLET, FILM COATED ORAL at 09:47

## 2024-05-06 RX ADMIN — INSULIN LISPRO 1 UNITS: 100 INJECTION, SOLUTION INTRAVENOUS; SUBCUTANEOUS at 17:11

## 2024-05-06 NOTE — ASSESSMENT & PLAN NOTE
Initially elevated on admission.  Now improved.  Continue metoprolol 100mg BID, cardizem 90mg BID, patient on Bumex drip, and as needed labetalol.

## 2024-05-06 NOTE — UTILIZATION REVIEW
Initial Clinical Review    Admission: Date/Time/Statement:   Admission Orders (From admission, onward)       Ordered        05/04/24 0011  INPATIENT ADMISSION  Once                          Orders Placed This Encounter   Procedures    INPATIENT ADMISSION     Standing Status:   Standing     Number of Occurrences:   1     Order Specific Question:   Level of Care     Answer:   Level 2 Stepdown / HOT [14]     Order Specific Question:   Estimated length of stay     Answer:   More than 2 Midnights     Order Specific Question:   Certification     Answer:   I certify that inpatient services are medically necessary for this patient for a duration of greater than two midnights. See H&P and MD Progress Notes for additional information about the patient's course of treatment.     ED Arrival Information       Expected   -    Arrival   5/3/2024 21:01    Acuity   Urgent              Means of arrival   Ambulance    Escorted by   [facility]    Service   Hospitalist    Admission type   Emergency              Arrival complaint   ems sob             Chief Complaint   Patient presents with    Shortness of Breath     Pt. Brought by EMS from home with c/o SOB+ chest pain. Flu like symptoms since today, cough + sneezing,  denies fever. Pt. Reports flying in from [place] yesterday. Has kidney problems as well. Diabetic.         Initial Presentation: 63 y.o. male to ED by EMS presents w 2 days of cough, substernal chest pain, w flu like symptoms since return flight from [place] day prior. Reports CP does not radiate; ADAMSON, PMH HTN, HLD, BOBBY, stage 3b CKD baseline CR 1.9-2 , DM2  EXAM  tachycardia w atrial flutter with RVR  hypertensive urgency; rales, scalp w small area of purulence w cellulitic change on midline scalp actively draining. CXR reveals bilateral lungs consistent w severe pulmonary edema; +2 madelin pitting edema; labs elevated  BNP/ BUN/ CR glucose; alk phos   Given Nitroglycerin, IV Lasix 20 mg, IV Bumex  GTT, IV Heparin GTT, PO Labetalol, insulin, placed on BiPAP  Inpatient SD2 ICU admission due to CEE on CKD 3b, HTN Urgency. Per Nephro consult considering need for dialysis; CT a/p ordered; Cardiology consult for recs to cont monitor BP goal of 160/100, PRN labetalol for SBP >180 or DBP>120, one time dose of Lasix 20 mg ; cont BiPAP for trial off in AM; AM BMP, avoid nephrotoxins SSI  Anticipated Length of Stay/Certification Statement: anticipated length of stay of greater than 2 midnights secondary to acute respiratory failure.   NEPHROLOGY  #Non-Oliguric KDIGO CEE stage 3 sever on CKD G4 with proteinuria   Baseline creatinine 1.89 mg/dL in June 2022, no labs in 2023:   Current creatinine: 6.97 mg/dL  Peak creatinine: 7.05 mg/dL, no hydronephrosis on CT. No urgent indication fo dialysis, possible progression of CKD. Maintain MAP > 65, hold parameters on BP meds, avoid nephrotoxins, adjust meds to GFR.   Nephrotic syndrome / Volume status/hypertension: Hypervolemic with pleural effusion and lower extremity edema  Blood pressure: Normotensive 125/95.  Hypertensive on admission 169/107.  Goal less than 140/90  Recommend:  Start Bumex drip 1 mg/h  Monitor urinary output  Cardiology  CEE on CKD unclear etiology w recs per Nephrology. Acute dyspnea may be renal etiology obtain ECHO; HTN not controlled; A flutter;   start BB & AC ( eliquis)  Date: 5/5/20204   Day 2:   Cardiology  + LE edema; not on tele w exam; will assure tele ordered; diuresing on bumex gtt. Breathing improved.   Echo demonstrated low normal LV systolic function, LVH, but no sig valvular abnormalities on prelim read. On metoprolol tartrate 50 mg twice daily.   BP still elevated: Will increase to 100 mg twice daily   Nephrology  Creatinine trending up. On IV Bumex GTT- increase dose  Non Oliguric KDIGO CEE stage 3 severe on CKD G4 w proteinuria;   anticipate patient will require dialysis if worsening kidney function   Possible progression of CKD, no labs  since 2022  Maintain MAP:  Over 65 mmHg if possible/avoid hypoperfusion:  Hold parameters on blood pressure medications.   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  If continues to be high,  consider adding amlodipine  BOBBY: Now off BiPAP cont HS BiPAP; feeling improved today. He thinks the swelling in his legs has decreased significantly. He has been urinating frequently. AM BMP  Essential HTN: Continue monitoring BP, Start Nifedipine, started by nephrology. Labetolol PRN for SBP >180  Decrease Lantus to 10 from 20, suspect 20 was too high in the setting of CEE & SSIsliding scale. Day 3 Keflex for scalp abscess      ED Triage Vitals   Temperature Pulse Respirations Blood Pressure SpO2   05/03/24 2112 05/03/24 2106 05/03/24 2106 05/03/24 2106 05/03/24 2106   97.9 °F (36.6 °C) (!) 141 22 (!) 169/107 98 %      Temp Source Heart Rate Source Patient Position - Orthostatic VS BP Location FiO2 (%)   05/03/24 2112 05/03/24 2106 05/03/24 2106 05/03/24 2106 --   Oral Monitor Sitting Right arm       Pain Score       05/03/24 2106       8          Wt Readings from Last 1 Encounters:   05/05/24 103 kg (227 lb 9.6 oz)     Additional Vital Signs:   Date/Time Temp Pulse Resp BP MAP (mmHg) SpO2 O2 Flow Rate (L/min) O2 Device O2 Interface Device Patient Position - Orthostatic VS   05/06/24 09:51:22 -- 111 Abnormal  -- 176/126 Abnormal  143 100 % -- -- -- --   05/06/24 0800 -- -- -- -- -- -- -- None (Room air) -- --   05/06/24 0700 97.5 °F  (36.4 °C) -- -- 176/106 Abnormal  -- -- -- -- -- --   05/06/24 0410 -- -- -- -- -- 100 % -- -- Face mask --   05/05/24 23:22:51 -- 77 -- 141/89 106 99 % -- -- -- --   05/05/24 2310 -- -- -- -- -- 99 % -- -- Face mask --   05/05/24 21:19:51 -- 120 Abnormal  18 151/104 Abnormal  120 97 % -- -- -- --   05/05/24 2000 -- -- -- -- -- 99 % -- None (Room air) -- --   05/05/24 19:45:55 -- 128 Abnormal  18 171/110 Abnormal  130 98 % -- -- -- --   05/05/24 15:53:54 98.1 °F (36.7 °C) 111 Abnormal  18 156/101 Abnormal  119 95 % -- -- -- Sitting   05/05/24 1021 -- 94 -- -- -- 98 % -- -- -- --   05/05/24 07:43:10 97.8 °F (36.6 °C) 122 Abnormal  17 145/83 104 97 % -- -- -- --   05/05/24 0400 -- -- -- -- -- 100 % -- -- Face mask --   05/04/24 2309 -- -- -- 156/90 -- -- -- -- -- --   05/04/24 2230 -- -- -- 190/100 Abnormal  -- 98 % -- None (Room air) -- Lying   05/04/24 22:23:46 98.1 °F (36.7 °C) 108 Abnormal  -- 169/124 Abnormal  139 97 % -- -- -- Lying   05/04/24 2210 -- -- -- -- -- 98 % -- -- Face mask --   05/04/24 20:10:58 -- 129 Abnormal  -- 160/118 Abnormal  132 98 % -- -- -- --   05/04/24 15:11:46 98.1 °F (36.7 °C) 100 -- 150/99 116 99 % -- None (Room air) -- --   05/04/24 1447 -- 91 18 152/88 -- 96 % -- None (Room air) -- Lying   05/04/24 1235 -- 129 Abnormal  -- 154/95 -- -- -- -- -- --   05/04/24 1130 -- 102 16 146/84 107 95 % -- None (Room air) -- Lying   05/04/24 1100 -- 110 Abnormal  16 150/95 119 97 % -- None (Room air) -- Lying   05/04/24 0700 -- 121 Abnormal  16 125/95 107 98 % -- BiPAP -- Lying   05/04/24 0230 -- 134 Abnormal  18 157/99 120 100 % -- BiPAP -- Sitting   05/04/24 0055 -- 134 Abnormal  18 193/120 Abnormal  146 100 % 30 L/min BiPAP -- Sitting     Weights (last 14 days)    Date/Time Weight Weight Method Height   05/06/24 0600 --  -- --   Weight: blood sugar  43 standing wgt not done until pt sugar is recovered at 05/06/24 0600   05/05/24 1100 103 kg (227 lb 9.6 oz) Standing scale --   05/05/24 0955 103 kg  "(227 lb 9.6 oz) Standing scale --   05/04/24 1100 98.9 kg (218 lb 0.6 oz) -- 5' 4\" (1.626 m)   05/03/24 2106 98.9 kg (218 lb) Stated 5' 4\" (1.626 m)     Pertinent Labs/Diagnostic Test Results:   5/3 EKG=  ECG rate assessment: tachycardic    Rhythm:     Rhythm: sinus tachycardia    Ectopy:     Ectopy: none    QRS:     QRS axis:  Left    QRS intervals:  Wide  Conduction:     Conduction: abnormal      Abnormal conduction: complete RBBB    ST segments:     ST segments:  Normal  T waves:     T waves: inverted    CT abdomen pelvis wo contrast   Final Result by Bradley Landon Kocher, MD (05/05 0832)      1. No obstructive uropathy. Bilateral nonobstructing renal calculi measuring up to 8 mm at the lower pole right kidney.   2. Small bilateral pleural effusions with adjacent compressive atelectasis.      Workstation performed: [id number]         XR chest 2 views   Final Result by Linda Aj MD (05/04 0932)      Severe pulmonary edema with small effusions.            Workstation performed: [id number]           Results from last 7 days   Lab Units 05/03/24 2154   SARS-COV-2  Negative     Results from last 7 days   Lab Units 05/05/24  0920 05/04/24  0233 05/03/24  2154   WBC Thousand/uL 6.00  --  6.29   HEMOGLOBIN g/dL 10.4*  --  9.6*   HEMATOCRIT % 33.4*  --  30.9*   PLATELETS Thousands/uL 259 223 233   TOTAL NEUT ABS Thousands/µL  --   --  3.95         Results from last 7 days   Lab Units 05/06/24  0556 05/05/24  0920 05/04/24  0521 05/03/24  2154 05/03/24  1247   SODIUM mmol/L 139 137 140 138 142   POTASSIUM mmol/L 4.7 5.4* 4.7 5.0 5.5*   CHLORIDE mmol/L 107 109* 113* 111* 113*   CO2 mmol/L 23 20* 20* 20* 20*   ANION GAP mmol/L 9 8 7 7 9   BUN mg/dL 66* 61* 58* 59* 59*   CREATININE mg/dL 7.91* 7.64* 6.97* 7.05* 7.25*   EGFR ml/min/1.73sq m 6 6 7 7 8*   CALCIUM mg/dL 8.5 8.9 8.1* 8.2* 8.4*   MAGNESIUM mg/dL 1.6*  --   --   --   --    PHOSPHORUS mg/dL  --  4.9*  --   --   --      Results from last 7 days   Lab " "Units 05/04/24  0521 05/03/24 2154 05/03/24  1247   AST U/L 15 20 15   ALT U/L 18 20 16   ALK PHOS U/L 138* 147* 136*   TOTAL PROTEIN g/dL 6.7 6.9 6.5   ALBUMIN g/dL 3.1* 3.2* 3.2*   TOTAL BILIRUBIN mg/dL 0.43 0.36 0.5     Results from last 7 days   Lab Units 05/06/24  0708 05/06/24  0651 05/05/24 2048 05/05/24  1604 05/05/24  1056 05/05/24  0739 05/04/24 2017 05/04/24  1622 05/04/24  1057 05/04/24  0728 05/03/24  2108   POC GLUCOSE mg/dl 72 63* 89 126 109 73 136 122 143* 108 191*     Results from last 7 days   Lab Units 05/06/24  0556 05/05/24  0920 05/04/24  0521 05/03/24 2154 05/03/24  1247   GLUCOSE RANDOM mg/dL 42* 89 117 193* 90         Results from last 7 days   Lab Units 05/03/24  1247   HEMOGLOBIN A1C % 7.8*   EAG mg/dL 177     No results found for: \"BETA-HYDROXYBUTYRATE\"       Results from last 7 days   Lab Units 05/04/24  0233   PH ZORAIDA  7.253*   PCO2 ZORAIDA mm Hg 45.0   PO2 ZORAIDA mm Hg 36.5   HCO3 ZORAIDA mmol/L 19.4*   BASE EXC ZORAIDA mmol/L -7.4   O2 CONTENT ZORAIDA ml/dL 8.6   O2 HGB, VENOUS % 63.5             Results from last 7 days   Lab Units 05/04/24 0233 05/03/24 2344 05/03/24 2154   HS TNI 0HR ng/L  --   --  30   HS TNI 2HR ng/L  --  27  --    HSTNI D2 ng/L  --  -3  --    HS TNI 4HR ng/L 28  --   --    HSTNI D4 ng/L -2  --   --      Results from last 7 days   Lab Units 05/03/24 2344   D-DIMER QUANTITATIVE ug/ml FEU 1.91*                             Results from last 7 days   Lab Units 05/03/24 2154   BNP pg/mL 220*     Results from last 7 days   Lab Units 05/03/24  2154   FERRITIN ng/mL 58   IRON SATURATION % 16   IRON ug/dL 37*   TIBC ug/dL 235*                                 Results from last 7 days   Lab Units 05/03/24  1247   CREATININE UR mg/dL 151.9     Results from last 7 days   Lab Units 05/03/24  2154   INFLUENZA A PCR  Negative   INFLUENZA B PCR  Negative   RSV PCR  Negative                 ED Treatment:   Medication Administration from 05/03/2024 2101 to 05/04/2024 1504         Date/Time " Order Dose Route Action     05/04/2024 0055 EDT nitroglycerin (NITROSTAT) SL tablet 0.4 mg 0.4 mg Sublingual Given     05/04/2024 0521 EDT heparin (porcine) subcutaneous injection 5,000 Units 5,000 Units Subcutaneous Given     05/04/2024 1109 EDT insulin lispro (HumALOG/ADMELOG) 100 units/mL subcutaneous injection 1-6 Units -- Subcutaneous Not Given     05/04/2024 0854 EDT insulin lispro (HumALOG/ADMELOG) 100 units/mL subcutaneous injection 1-6 Units -- Subcutaneous Not Given     05/04/2024 0521 EDT furosemide (LASIX) injection 20 mg 20 mg Intravenous Given     05/04/2024 1039 EDT insulin glargine (LANTUS) subcutaneous injection 20 Units 0.2 mL 20 Units Subcutaneous Given     05/04/2024 0705 EDT labetalol (NORMODYNE) injection 10 mg 10 mg Intravenous Not Given     05/04/2024 1038 EDT sodium bicarbonate tablet 650 mg 650 mg Oral Given     05/04/2024 1039 EDT bumetanide (BUMEX) 12.5 mg infusion 50 mL 1 mg/hr Intravenous New Bag     05/04/2024 1235 EDT metoprolol tartrate (LOPRESSOR) tablet 50 mg 50 mg Oral Given     05/04/2024 1214 EDT apixaban (ELIQUIS) tablet 2.5 mg 2.5 mg Oral Not Given     05/04/2024 1235 EDT apixaban (ELIQUIS) tablet 2.5 mg 2.5 mg Oral Given          History reviewed. No pertinent past medical history.  Present on Admission:   Acute renal failure superimposed on stage 3 chronic kidney disease (HCC)   Essential hypertension   Severe obstructive sleep apnea   Stage 3b chronic kidney disease (HCC)      Admitting Diagnosis: Abscess or cellulitis of scalp [L03.811]  Pulmonary edema [J81.1]  SOB (shortness of breath) [R06.02]  Acute chest pain [R07.9]  Acute renal failure (ARF) (HCC) [N17.9]  Age/Sex: 63 y.o. male  Admission Orders:  Telemetry  BiPAP  Strict I/O  Daily WT  SCD    Scheduled Medications:  apixaban, 5 mg, Oral, BID  cephalexin, 250 mg, Oral, Q12H BOBBI  diltiazem, 90 mg, Oral, Q12H [facility]  insulin lispro, 1-6 Units, Subcutaneous, TID AC  magnesium sulfate, 4 g, Intravenous, Once  metoprolol  tartrate, 100 mg, Oral, Q12H BOBBI  sodium bicarbonate, 650 mg, Oral, BID after meals    Continuous IV Infusions:           Medications 05/04 05/05 05/06   bumetanide (BUMEX) 12.5 mg infusion 50 mL  Rate: 8 mL/hr Dose: 2 mg/hr  Freq: Continuous Route: IV  Last Dose: 2 mg/hr (05/06/24 0528)  Start: 05/05/24 1030 End: 05/06/24 1023     1024     1351     1940     2317      0528     1023-D/C'd      bumetanide (BUMEX) 12.5 mg infusion 50 mL  Rate: 4 mL/hr Dose: 1 mg/hr  Freq: Continuous Route: IV  Last Dose: Stopped (05/05/24 1026)  Start: 05/04/24 1000 End: 05/05/24 1004    1039     1543     2004      0248     1004-D/C'd  1026             PRN Meds:  acetaminophen, 650 mg, Oral, Q6H PRN  labetalol, 10 mg, Intravenous, Q4H PRN        IP CONSULT TO CARDIOLOGY  IP CONSULT TO NEPHROLOGY    Network Utilization Review Department  ATTENTION: Please call with any questions or concerns to [phone] and carefully listen to the prompts so that you are directed to the right person. All voicemails are confidential.   For Discharge needs, contact Care Management DC Support Team at [phone] opt. [phone]  Send all requests for admission clinical reviews, approved or denied determinations and any other requests to dedicated fax number below belonging to the campus where the patient is receiving treatment. List of dedicated fax numbers for the Facilities:  FACILITY NAME UR FAX NUMBER   ADMISSION DENIALS (Administrative/Medical Necessity) [phone]   DISCHARGE SUPPORT TEAM (NETWORK) [phone]   PARENT CHILD HEALTH (Maternity/NICU/Pediatrics) [phone]   [facility] [phone]   [facility] [phone]   [facility] [phone]   [facility] [phone]   [facility] [phone]   [facility] [phone]   [facility] [phone]   [facility]  [facility] [phone]   [facility] [phone]   Count includes the [facility] [phone]   [facility] [phone]   [facility] [phone]

## 2024-05-06 NOTE — ASSESSMENT & PLAN NOTE
Hx of BOBBY with CPAP use at home  - patient endorse nightly use  Patient was started on BiPAP for respiratory distress in ED  Patient now off BiPAP    Plan:  Continue CPAP nightly

## 2024-05-06 NOTE — PROGRESS NOTES
General Cardiology   Progress Note -  Team One   Jerod Worthington 63 y.o. male MRN: [id number]    Unit/Bed#: S -01 Encounter: [id number]    Assessment  1. Newly diagnosed atrial flutter, unclear chronicity.  -ECG on admission demonstrated atrial flutter, RBBB (chronic) rate 140 bpm  -Repeat ECG 5/5; demonstrated atrial flutter with variable AVB, HR 88 bpm. RBBB (chronic)  -Outpatient rate/rhythm control regimen; none.  -Inpatient rate/rhythm control; metoprolol tartrate 100 mg twice daily  -Started on apixaban 2.5 mg twice daily on 5/4.  2. Volume overload - likely stemming from severe CEE.  -BNP level 220  -Chest x-ray 5/3-severe pulmonary edema with small bilateral pleural effusions.  -TTE 5/4/2024; LVEF 60%, wall motion normal, RV normal size/systolic function, moderate MR.  -Outpatient diuretic regimen; none  -Inpatient diuretic regimen; IV Bumex GTT at 2 mg/hr  -24-hour I&O balance; -4.8 L, overall -6.9 L  3. Nephrotic syndrome  4. CEE superimposed on CKD stage IV.  Nephrology following.  UACR 3139.6  Serum albumin 3.1  -Baseline creatinine around 1.89  -Creatinine level 7.25 on admission, currently 7.91.  5. Hypertension  -Average /106, last recorded 176/126  -Outpatient BP regimen; none  -Inpatient BP regimen; metoprolol tartrate 100 mg twice daily and nifedipine 60 mg daily  6. DM type II  -HgbA1c level 7.8  7. BOBBY - on CPAP at HS.    Plan  -Pt denies any specific cardiac complaints this a.m.  He notes improvement in his degree of shortness of breath and lower extremity edema.  Significant urinary response over the past 24 hours, net -4.8 L.  He still remains volume overloaded on exam and persistently requiring supplemental O2.  Would recommend further diuresis, may consider decreasing IV Bumex GTT rate, but will defer to the nephro team who is actively managing his IV diuretic regimen.  -Remains in atrial flutter, rates are suboptimally controlled in the low 100s to 120 range.  Would favor utilizing  oral Cardizem over the nifedipine.  Will switch from nifedipine to Cardizem SR 90 mg every 12 hours and assess his response.  Continue metoprolol tartrate 100 mg twice daily.  Remains on apixaban for systemic AC, therapeutic dose would be 5 mg twice daily, will make that adjustment today.  Would consider AROLDO CV procedure in attempt for restoration of NSR remains in atrial flutter once he is more optimized from a volume perspective.  -Strict I's and O's, daily weights, 2 g Na+ diet 1.8 LFR.  -Monitor renal function and electrolytes closely.  Replete to maintain K+ level 4.0 magnesium level 2.0.  -Continue to monitor on telemetry.    Subjective  Review of Systems   Constitutional: Positive for malaise/fatigue. Negative for chills and fever.   Eyes:  Negative for visual disturbance.   Cardiovascular:  Positive for leg swelling. Negative for chest pain, dyspnea on exertion, orthopnea and palpitations.   Respiratory:  Negative for cough and shortness of breath.    Gastrointestinal:  Negative for abdominal pain.   Neurological:  Negative for dizziness, headaches and light-headedness.       Objective:   Physical Exam  Vitals and nursing note reviewed.   Constitutional:       General: He is not in acute distress.     Appearance: He is obese. He is not diaphoretic.   HENT:      Head: Normocephalic and atraumatic.   Cardiovascular:      Rate and Rhythm: Tachycardia present. Rhythm irregular.      Pulses: Normal pulses.      Heart sounds: Normal heart sounds.   Pulmonary:      Effort: Pulmonary effort is normal.      Breath sounds: Rales present. No wheezing.   Abdominal:      General: There is distension.      Palpations: Abdomen is soft.      Tenderness: There is no abdominal tenderness.   Musculoskeletal:         General: Swelling present.      Right lower leg: Edema present.      Left lower leg: Edema present.   Skin:     General: Skin is warm and dry.      Capillary Refill: Capillary refill takes less than 2 seconds.  "  Neurological:      General: No focal deficit present.      Mental Status: He is alert and oriented to person, place, and time.   Psychiatric:         Mood and Affect: Mood normal.         Vitals: Blood pressure (!) 176/126, pulse (!) 111, temperature 97.5 °F (36.4 °C), resp. rate 18, height 5' 4\" (1.626 m), weight 103 kg (227 lb 9.6 oz), SpO2 100%.,     Body mass index is 39.07 kg/m².,   Systolic (24hrs), Av , Min:141 , Max:176     Diastolic (24hrs), Av, Min:89, Max:126      Intake/Output Summary (Last 24 hours) at 2024 0957  Last data filed at 2024 0951  Gross per 24 hour   Intake 390 ml   Output 5300 ml   Net -4910 ml     Weight (last 2 days)       Date/Time Weight    24 0600 --     Weight: blood sugar  43 standing wgt not done until pt sugar is recovered at 24 0600    24 1100 103 (227.6)    24 0955 103 (227.6)    24 1100 98.9 (218.04)            LABORATORY RESULTS      CBC with diff:   Results from last 7 days   Lab Units 24  0920 24  0233 24  215   WBC Thousand/uL 6.00  --  6.29   HEMOGLOBIN g/dL 10.4*  --  9.6*   HEMATOCRIT % 33.4*  --  30.9*   MCV fL 99*  --  99*   PLATELETS Thousands/uL 259 223 233   RBC Million/uL 3.37*  --  3.12*   MCH pg 30.9  --  30.8   MCHC g/dL 31.1*  --  31.1*   RDW % 14.2  --  14.5   MPV fL 9.2 9.1 9.6   NRBC AUTO /100 WBCs  --   --  0       CMP:  Results from last 7 days   Lab Units 24  0556 24  0920 24  0521 24  21524  1247   POTASSIUM mmol/L 4.7 5.4* 4.7 5.0 5.5*   CHLORIDE mmol/L 107 109* 113* 111* 113*   CO2 mmol/L 23 20* 20* 20* 20*   BUN mg/dL 66* 61* 58* 59* 59*   CREATININE mg/dL 7.91* 7.64* 6.97* 7.05* 7.25*   CALCIUM mg/dL 8.5 8.9 8.1* 8.2* 8.4*   AST U/L  --   --  15 20 15   ALT U/L  --   --  18 20 16   ALK PHOS U/L  --   --  138* 147* 136*   EGFR ml/min/1.73sq m 6 6 7 7 8*       BMP:  Results from last 7 days   Lab Units 24  0556 24  0920 24  0521 " "05/03/24  2154 05/03/24  1247   POTASSIUM mmol/L 4.7 5.4* 4.7 5.0 5.5*   CHLORIDE mmol/L 107 109* 113* 111* 113*   CO2 mmol/L 23 20* 20* 20* 20*   BUN mg/dL 66* 61* 58* 59* 59*   CREATININE mg/dL 7.91* 7.64* 6.97* 7.05* 7.25*   CALCIUM mg/dL 8.5 8.9 8.1* 8.2* 8.4*       No results found for: \"NTBNP\"     Results from last 7 days   Lab Units 05/06/24  0556   MAGNESIUM mg/dL 1.6*       Results from last 7 days   Lab Units 05/03/24  1247   HEMOGLOBIN A1C % 7.8*                   Lipid Profile:   No results found for: \"CHOL\"  No results found for: \"HDL\"  No results found for: \"LDLCALC\"  No results found for: \"TRIG\"    Cardiac testing:   No results found for this or any previous visit.    No results found for this or any previous visit.    No results found for this or any previous visit.    No valid procedures specified.  No results found for this or any previous visit.      Meds/Allergies   all current active meds have been reviewed and current meds:   Current Facility-Administered Medications   Medication Dose Route Frequency    acetaminophen (TYLENOL) tablet 650 mg  650 mg Oral Q6H PRN    apixaban (ELIQUIS) tablet 2.5 mg  2.5 mg Oral BID    bumetanide (BUMEX) 12.5 mg infusion 50 mL  2 mg/hr Intravenous Continuous    cephalexin (KEFLEX) capsule 250 mg  250 mg Oral Q12H BOBBI    insulin lispro (HumALOG/ADMELOG) 100 units/mL subcutaneous injection 1-6 Units  1-6 Units Subcutaneous TID AC    labetalol (NORMODYNE) injection 10 mg  10 mg Intravenous Q4H PRN    magnesium sulfate 4 g/100 mL IVPB (premix) 4 g  4 g Intravenous Once    metoprolol tartrate (LOPRESSOR) tablet 100 mg  100 mg Oral Q12H BOBBI    NIFEdipine (PROCARDIA XL) 24 hr tablet 60 mg  60 mg Oral Daily    sodium bicarbonate tablet 650 mg  650 mg Oral BID after meals         Counseling / Coordination of Care  Total floor / unit time spent today 20 minutes.  Greater than 50% of total time was spent with the patient and / or family counseling and / or coordination of " care.      ** Please Note: Dragon 360 Dictation voice to text software may have been used in the creation of this document. **

## 2024-05-06 NOTE — ASSESSMENT & PLAN NOTE
Home regimen of Lantus 20 units at lunch and at bedtime    Lab Results   Component Value Date    HGBA1C 7.8 (H) 05/03/2024     Recent Labs     05/05/24  1604 05/05/24  2048 05/06/24  0651 05/06/24  0708   POCGLU 126 89 63* 72     Blood Sugar Average: Last 72 hrs:  (P) 121.7122351056204209    Patient hypoglycemic to 42 this AM, asymptomatic  Decrease Lantus to 10 from 20, suspect 20 was too high in the setting of CEE  Continue sliding scale.

## 2024-05-06 NOTE — ASSESSMENT & PLAN NOTE
"Hx of CKD with ARF present on admission    Lab Results   Component Value Date    EGFR 5 05/07/2024    EGFR 6 05/06/2024    EGFR 6 05/06/2024    CREATININE 8.89 (H) 05/07/2024    CREATININE 8.25 (H) 05/06/2024    CREATININE 7.91 (H) 05/06/2024       Plan:  Monitor input and output  Monitor BMP  See \"Acute Renal Failure\" for complete plan  Nephrology consult, appreciate recommendations  Avoid nephrotoxins  "

## 2024-05-06 NOTE — ASSESSMENT & PLAN NOTE
Presented with chest pain and shortness of breath  -No home medications reported  Meeting criteria for hypertensive emergency in ED with BP of 193/120  -Nitroglycerin was administered in the ED, with improvement in BP  -Patient on Bumex drip and Metoprolol 100 mg BID    Plan:  Continue monitoring BP  Start Nifedipine, started by nephrology   Labetolol PRN for SBP >180  Cardiology consulted, appreciate recommendations

## 2024-05-06 NOTE — PHYSICAL THERAPY NOTE
PHYSICAL THERAPY EVALUATION   DATE: 05/06/24  TIME: 6430-1043    NAME:  Jerod Worthington  AGE:   63 y.o.  Mrn:   [id number]  Length Of Stay: 2    ADMIT DX:  Abscess or cellulitis of scalp [L03.811]  Pulmonary edema [J81.1]  SOB (shortness of breath) [R06.02]  Acute chest pain [R07.9]  Acute renal failure (ARF) (HCC) [N17.9]    History reviewed. No pertinent past medical history.  History reviewed. No pertinent surgical history.    Performed at least 2 patient identifiers during session: Name, Birthday, and ID bracelet     05/06/24 1036   PT Last Visit   PT Visit Date 05/06/24   Note Type   Note type Evaluation   Additional Comments [employer]  #[id number] utilized t/o session   Pain Assessment   Pain Assessment Tool Nair-Baker FACES   Nair-Baker FACES Pain Rating 2   Pain Location/Orientation Orientation: Bilateral;Orientation: Lower;Location: Leg  (posterior aspect)   Pain Onset/Description Frequency: Intermittent;Descriptor: Discomfort   Effect of Pain on Daily Activities limits comfort and gait speed   Patient's Stated Pain Goal No pain   Hospital Pain Intervention(s) Repositioned;Ambulation/increased activity;Emotional support   Multiple Pain Sites No   Restrictions/Precautions   Weight Bearing Precautions Per Order No   Other Precautions Fall Risk;Pain;Multiple lines;Telemetry;O2;Chair Alarm  (language barrier - use of  t/o session)   Home Living   Type of Home House   Home Layout Performs ADLs on one level;Able to live on main level with bedroom/bathroom;Stairs to enter with rails  (~10 FADIA with HR, then maintains FFSU)   Bathroom Accessibility Accessible   Home Equipment Other (Comment)  (none)   Additional Comments 2L O2 at night only   Prior Function   Level of [place] Independent with ADLs;Independent with functional mobility;Independent with IADLS   Lives With Daughter  (& granddaughter)   Receives Help From Family  (dtr is home during the daytime)   IADLs Independent with medication  "management;Independent with driving;Independent with meal prep   Falls in the last 6 months 0   Vocational Unemployed   Comments At baseline, pt reports being independent with all aspects of self care and functional mobility of household and community distances with no AD.   General   Additional Pertinent History Pt is a 63 yr old male admitted 5/3/24 from home with SOB and chest pain, flu like symptoms. Dx: acute renal failure, hypertensive emergency, Aflutter.   Family/Caregiver Present No   Cognition   Overall Cognitive Status WFL   Arousal/Participation Alert   Attention Within functional limits   Orientation Level Oriented X4   Memory Within functional limits   Following Commands Follows one step commands without difficulty   Subjective   Subjective \"I would like a walker to take home.\"   RUE Assessment   RUE Assessment WFL   LUE Assessment   LUE Assessment WFL   RLE Assessment   RLE Assessment WFL   LLE Assessment   LLE Assessment WFL   Vision-Basic Assessment   Current Vision Does not wear glasses   Coordination   Movements are Fluid and Coordinated 1   Sensation WFL   Light Touch   RLE Light Touch Grossly intact   LLE Light Touch Grossly intact   Proprioception   RLE Proprioception Grossly intact   LLE Proprioception Grossly Intact   Bed Mobility   Additional Comments Bed mobility NT on this date as pt presents and was left seated OOB in recliner chair at end of session.   Transfers   Sit to Stand 5  Supervision   Additional items Assist x 1;Armrests;Increased time required;Verbal cues   Stand to Sit 5  Supervision   Additional items Assist x 1;Armrests;Increased time required;Verbal cues   Stand pivot 5  Supervision   Additional items Assist x 1;Increased time required;Verbal cues  (RW)   Additional Comments Increased time and effort for transfers however no physical assistance needed from therapist.   Ambulation/Elevation   Gait pattern Wide JESSENIA;Decreased foot clearance;Short stride;Decreased hip " extension;Decreased heel strike   Gait Assistance 5  Supervision   Additional items Assist x 1;Verbal cues   Assistive Device Rolling walker   Distance 80ft x1 with multiple changes in direction   Stair Management Assistance Not tested  (pt has ~10 FADIA his home)   Balance   Static Sitting Good   Dynamic Sitting Fair +   Static Standing Fair  (w/ RW)   Dynamic Standing Fair  (w/ RW)   Ambulatory Fair  (w/ RW)   Endurance Deficit   Endurance Deficit Yes   Activity Tolerance   Activity Tolerance Patient tolerated treatment well;Patient limited by pain   Medical Staff Made Aware Spoke with CM, OT, RN   Assessment   Prognosis Good   Problem List Decreased strength;Decreased endurance;Impaired balance;Decreased mobility;Decreased safety awareness;Obesity;Pain   Assessment Pt seen for PT evaluation for mobility assessment & discharge needs. Activity orders: Up and OOB as tolerated. Pt admitted 5/3/2024 w/ flu like symptoms, dx Acute renal failure superimposed on stage 3 chronic kidney disease (HCC). Comorbidities affecting pt's fnxl performance include: DM, CKD, HTN. During PT IE, pt completes transfers with S and inc time/effort, ambulates 80ft with RW and S. Pt declines desire for attempt at ambulation with no AD at this time d/t pain in B lower legs. Pt displays above outlined functional impairments & limitations, and presents below his baseline level of functional mobility. The AM-PAC & Barthel Index outcome tools were used to assist in determining pt safety w/ mobility/self care & appropriate d/c recommendations, see above for scores. Pt is at risk of falls d/t multiple comorbidities, impaired balance, use of ambulatory aid, varying levels of pain , acuity of medical illness, and ongoing medical treatment of primary dx. Pt's clinical presentation is currently unstable/unpredictable as seen in pt's presentation of changing level of pain, increased fall risk, new onset of impairment of functional mobility, decreased  "endurance, and new onset of weakness. Pt will benefit from continued PT services in order to address impairments, decrease risk of falls, maximize independence w/ fnxl mobility, & ensure safety w/ mobility for transition to next level of care. Based on pt presentation & impairments, pt would most appropriately benefit from Level III (minimal PT intensity) resources upon d/c.   Barriers to Discharge Inaccessible home environment  (pending pt's progress with elevations as pt has full flight of steps to access his home)   Goals   Patient Goals \"to go home\"   [facility] [[email or web address]] Expiration Date 05/20/24   Short Term Goal #1 Patient PT goals established in order to address pt self reported goal of \"to go home\". Pt will: complete all bed mobility independently in order to promote increased OOB functional mobility and simulate home environment; complete all transfers independently in order to increase safety with functional mobility; ambulate >150ft with LRAD at [place] level in order to increase safety with household and short community functional mobility; negotiate 8-10 stairs with HR assist and S in order to facilitate safe access to his home; demonstrate understanding and independence with LE strengthening HEP; improve ambulatory balance to >/= good grade with LRAD in order to promote safety and increased independence with mobility; improve AM-PAC score to >/= 23/24 in order to increase independence with mobility and decrease burden of care; improve Barthel Index score to >/= 75/100 in order to increase independence and decrease risk of falls.   PT Treatment Day 0   Plan   Treatment/Interventions Functional transfer training;LE strengthening/ROM;Elevations;Therapeutic exercise;Endurance training;Patient/family training;Equipment eval/education;Bed mobility;Gait training;Spoke to nursing;Spoke to case management   PT Frequency 2-3x/wk   Discharge Recommendation   Rehab Resource Intensity Level, PT III (Minimum Resource Intensity) "   Equipment Recommended Walker   Walker Package Recommended Wheeled walker   Change/add to Walker Package? No   AM-PAC Basic Mobility Inpatient   Turning in Flat Bed Without Bedrails 4   Lying on Back to Sitting on Edge of Flat Bed Without Bedrails 3   Moving Bed to Chair 3   Standing Up From Chair Using Arms 3   Walk in Room 3   Climb 3-5 Stairs With Railing 3   Basic Mobility Inpatient Raw Score 19   Basic Mobility Standardized Score 42.48   [facility] Highest Level Of Mobility   JH-HLM Goal 6: Walk 10 steps or more   JH-HLM Achieved 7: Walk 25 feet or more   Modified [place] Scale   Modified Dimitry Scale 2   Barthel Index   Feeding 10   Bathing 0   Grooming Score 5   Dressing Score 10   Bladder Score 10   Bowels Score 10   Toilet Use Score 5   Transfers (Bed/Chair) Score 10   Mobility (Level Surface) Score 0   Stairs Score 5   Barthel Index Score 65   End of Consult   Patient Position at End of Consult Bedside chair;All needs within reach  (no chair alarm engaged upon therapist entry)       This session, pt required and most appropriately benefited from partial or full skilled PT/OT co-eval due to continuous vitals monitoring, decreased activity tolerance, and unpredictable medical and/or functional status. PT and OT goals were addressed separately as seen in documentation.    Based on patient's [facility] Highest Level of Mobility scores today, patient currently has a goal of -M Levels: 7: WALK 25 FEET OR MORE, to be completed with RN staffing each shift, in order to improve overall activity tolerance and mobility, combat hospital related deconditioning, and maximize outcomes for d/c from the acute care setting.     The patient's AM-PAC Basic Mobility Inpatient Short Form Raw Score is 19. A Raw score of greater than 16 suggests the patient may benefit from discharge to home. Please also refer to the recommendation of the Physical Therapist for safe discharge planning.    Swathi Dunbar, PT, DPT   Available  via [facility] # [id number]  [place] License - [id number]  5/6/2024

## 2024-05-06 NOTE — ASSESSMENT & PLAN NOTE
Hx of BOBBY with CPAP use at home  - patient endorse nightly use  Patient was started on BiPAP for respiratory distress in ED  Patient now off BiPAP    Plan:  Continue CPAP nightly   WDL

## 2024-05-06 NOTE — ASSESSMENT & PLAN NOTE
Presented with chest pain and shortness of breath  -No home medications reported  Meeting criteria for hypertensive emergency in ED with BP of 193/120  -Nitroglycerin was administered in the ED, with improvement in BP  -Patient on Bumex drip and Metoprolol 100 mg BID    Plan:  Continue monitoring BP  Continue Cardizem 90mg BID  Labetolol PRN for SBP >180  Cardiology consulted, appreciate recommendations

## 2024-05-06 NOTE — ASSESSMENT & PLAN NOTE
POA  Patient with history of stage IIIb CKD  Previously known baseline creatinine of 1.9-2     Recent Labs     05/04/24  0521 05/05/24  0920 05/06/24  0556   CREATININE 6.97* 7.64* 7.91*   EGFR 7 6 6     Estimated Creatinine Clearance: 10.6 mL/min (A) (by C-G formula based on SCr of 7.91 mg/dL (H)).    Creatinine trending up.  Patient currently on Bumex drip at 2 mg/h per nephrology  Urine output improving: -5,175 over past 24 hours  If no improvement in creatinine continues to worsen, might need to consider dialysis.  Nephrology consulted appreciate recommendations

## 2024-05-06 NOTE — ASSESSMENT & PLAN NOTE
POA  Patient with history of stage IIIb CKD  Previously known baseline creatinine of 1.9-2     Recent Labs     05/06/24  0556 05/06/24  1819 05/07/24  0453   CREATININE 7.91* 8.25* 8.89*   EGFR 6 6 5     Estimated Creatinine Clearance: 9.4 mL/min (A) (by C-G formula based on SCr of 8.89 mg/dL (H)).    -Patient placed on Bumex drip 2mg/hr per nephrology with urine output of 5,175 over 24h by the morning of 5/6  -Sodium bicarb decreased to 325mg BID on 5/6, as acid-base balance improved  -Nephrology decreased Bumex drip to 1mg/hr on 5/6, with  over 24h by the morning of 5/7  -Urine output decreased from previous day, while on Bumex drip    Plan:  Currently on Bumex drip 1mg/hr and sodium bicarb 325mg per nephrology   As creatinine continues to worsen, nephrology planning to initiate dialysis  NPO pending permacath placement, IR consulted   Nephrology consulted appreciate recommendations

## 2024-05-06 NOTE — PLAN OF CARE
Problem: PAIN - ADULT  Goal: Verbalizes/displays adequate comfort level or baseline comfort level  Description: Interventions:  - Encourage patient to monitor pain and request assistance  - Assess pain using appropriate pain scale  - Administer analgesics based on type and severity of pain and evaluate response  - Implement non-pharmacological measures as appropriate and evaluate response  - Consider cultural and social influences on pain and pain management  - Notify physician/advanced practitioner if interventions unsuccessful or patient reports new pain  Outcome: Progressing     Problem: INFECTION - ADULT  Goal: Absence or prevention of progression during hospitalization  Description: INTERVENTIONS:  - Assess and monitor for signs and symptoms of infection  - Monitor lab/diagnostic results  - Monitor all insertion sites, i.e. indwelling lines, tubes, and drains  - Monitor endotracheal if appropriate and nasal secretions for changes in amount and color  - [place] appropriate cooling/warming therapies per order  - Administer medications as ordered  - Instruct and encourage patient and family to use good hand hygiene technique  - Identify and instruct in appropriate isolation precautions for identified infection/condition  Outcome: Progressing  Goal: Absence of fever/infection during neutropenic period  Description: INTERVENTIONS:  - Monitor WBC    Outcome: Progressing     Problem: SAFETY ADULT  Goal: Patient will remain free of falls  Description: INTERVENTIONS:  - Educate patient/family on patient safety including physical limitations  - Instruct patient to call for assistance with activity   - Consult OT/PT to assist with strengthening/mobility   - Keep Call bell within reach  - Keep bed low and locked with side rails adjusted as appropriate  - Keep care items and personal belongings within reach  - Initiate and maintain comfort rounds  - Apply yellow socks and bracelet for high fall risk patients  - Consider  moving patient to room near nurses station  Outcome: Progressing  Goal: Maintain or return to baseline ADL function  Description: INTERVENTIONS:  -  Assess patient's ability to carry out ADLs; assess patient's baseline for ADL function and identify physical deficits which impact ability to perform ADLs (bathing, care of mouth/teeth, toileting, grooming, dressing, etc.)  - Assess/evaluate cause of self-care deficits   - Assess range of motion  - Assess patient's mobility; develop plan if impaired  - Assess patient's need for assistive devices and provide as appropriate  - Encourage maximum independence but intervene and supervise when necessary  - Involve family in performance of ADLs  - Assess for home care needs following discharge   - Consider OT consult to assist with ADL evaluation and planning for discharge  - Provide patient education as appropriate  Outcome: Progressing  Goal: Maintains/Returns to pre admission functional level  Description: INTERVENTIONS:  - Perform AM-PAC 6 Click Basic Mobility/ Daily Activity assessment daily.  - Set and communicate daily mobility goal to care team and patient/family/caregiver.   - Collaborate with rehabilitation services on mobility goals if consulted  - Out of bed for toileting  - Record patient progress and toleration of activity level   Outcome: Progressing     Problem: DISCHARGE PLANNING  Goal: Discharge to home or other facility with appropriate resources  Description: INTERVENTIONS:  - Identify barriers to discharge w/patient and caregiver  - Arrange for needed discharge resources and transportation as appropriate  - Identify discharge learning needs (meds, wound care, etc.)  - Arrange for interpretive services to assist at discharge as needed  - Refer to Case Management Department for coordinating discharge planning if the patient needs post-hospital services based on physician/advanced practitioner order or complex needs related to functional status, cognitive  ability, or social support system  Outcome: Progressing     Problem: Knowledge Deficit  Goal: Patient/family/caregiver demonstrates understanding of disease process, treatment plan, medications, and discharge instructions  Description: Complete learning assessment and assess knowledge base.  Interventions:  - Provide teaching at level of understanding  - Provide teaching via preferred learning methods  Outcome: Progressing

## 2024-05-06 NOTE — PROGRESS NOTES
[facility]  Progress Note  Name: Jerod Worthington I  MRN: [id number]  Unit/Bed#: S -01 I Date of Admission: 5/3/2024   Date of Service: 5/6/2024 I Hospital Day: 2    Assessment/Plan   * Acute renal failure superimposed on stage 3 chronic kidney disease (HCC)  Assessment & Plan  POA  Patient with history of stage IIIb CKD  Previously known baseline creatinine of 1.9-2     Recent Labs     05/04/24  0521 05/05/24  0920 05/06/24  0556   CREATININE 6.97* 7.64* 7.91*   EGFR 7 6 6     Estimated Creatinine Clearance: 10.6 mL/min (A) (by C-G formula based on SCr of 7.91 mg/dL (H)).    Creatinine trending up.  Patient currently on Bumex drip at 2 mg/h per nephrology  Urine output improving: -5,175 over past 24 hours  If no improvement in creatinine continues to worsen, might need to consider dialysis.  Nephrology consulted appreciate recommendations      Scalp abscess  Assessment & Plan  Patient was noted to have small scalp abscess on admission in ED.  Was drained.  Continue Keflex (day 3)    Hypertensive emergency  Assessment & Plan  Initially elevated on admission.  Now improved.  Continue metoprolol, patient on Bumex drip, as needed labetalol.  If continues to be high, will consider adding amlodipine.      Essential hypertension  Assessment & Plan  Presented with chest pain and shortness of breath  -No home medications reported  Meeting criteria for hypertensive emergency in ED with BP of 193/120  -Nitroglycerin was administered in the ED, with improvement in BP  -Patient on Bumex drip and Metoprolol 100 mg BID    Plan:  Continue monitoring BP  Start Nifedipine, started by nephrology   Labetolol PRN for SBP >180  Cardiology consulted, appreciate recommendations      Stage 3b chronic kidney disease (HCC)  Assessment & Plan  Hx of CKD with ARF present on admission    Lab Results   Component Value Date    EGFR 6 05/06/2024    EGFR 6 05/05/2024    EGFR 7 05/04/2024    CREATININE 7.91 (H) 05/06/2024     CREATININE 7.64 (H) 05/05/2024    CREATININE 6.97 (H) 05/04/2024       Plan:  Monitor input and output  Repeat BMP in AM  Nephrology consult, appreciate recommendations  Avoid nephrotoxins    Type 2 diabetes mellitus with hyperglycemia, with long-term current use of insulin (HCC)  Assessment & Plan  Home regimen of Lantus 20 units at lunch and at bedtime    Lab Results   Component Value Date    HGBA1C 7.8 (H) 05/03/2024     Recent Labs     05/05/24  1604 05/05/24  2048 05/06/24  0651 05/06/24  0708   POCGLU 126 89 63* 72     Blood Sugar Average: Last 72 hrs:  (P) 121.0685920959548391    Patient hypoglycemic to 42 this AM, asymptomatic  Decrease Lantus to 10 from 20, suspect 20 was too high in the setting of CEE  Continue sliding scale.    Severe obstructive sleep apnea  Assessment & Plan  Hx of BOBBY with CPAP use at home  - patient endorse nightly use  Patient was started on BiPAP for respiratory distress in ED  Patient now off BiPAP    Plan:  Continue CPAP nightly           VTE Pharmacologic Prophylaxis: VTE Score: 4 Moderate Risk (Score 3-4) - Pharmacological DVT Prophylaxis Ordered: apixaban (Eliquis).    Mobility:   Basic Mobility Inpatient Raw Score: 16  JH-HLM Goal: 5: Stand one or more mins  JH-HLM Achieved: 5: Stand (1 or more minutes)  JH-HLM Goal achieved. Continue to encourage appropriate mobility.    Patient Centered Rounds:  Will contact RN on rounds  Discussions with Specialists or Other Care Team Provider: Cardiology and Nephrology notes reviewed    Education and Discussions with Family / Patient: Patient declined call to .  - Declined, and says that he has been keeping family updated.     Current Length of Stay: 2 day(s)  Current Patient Status: Inpatient   Discharge Plan: Anticipate discharge in 48-72 hrs to home.    Code Status: Level 1 - Full Code    Subjective:   Interview performed using [employer] iPad interpretor in Romanian.      The patient's blood glucose dropped to 42 this  morning. The patient was completely asymptomatic. His sugars improved after OJ and a snack.     The patient says that he is feeling improved today. He thinks the swelling in his legs has decreased significantly. He has been urinating frequently. He denies HA, fever, chills, chest pain, palpitations, SOB, abdominal pain, N/V/D/C (last BM yesterday), or difficulty with urination. All questions answered.     Objective:     Vitals:   Temp (24hrs), Av.8 °F (36.6 °C), Min:97.5 °F (36.4 °C), Max:98.1 °F (36.7 °C)    Temp:  [97.5 °F (36.4 °C)-98.1 °F (36.7 °C)] 97.5 °F (36.4 °C)  HR:  [] 77  Resp:  [18] 18  BP: (141-176)/() 176/106  SpO2:  [95 %-100 %] 100 %  Body mass index is 39.07 kg/m².     Input and Output Summary (last 24 hours):     Intake/Output Summary (Last 24 hours) at 2024 0831  Last data filed at 2024 0553  Gross per 24 hour   Intake 360 ml   Output 4675 ml   Net -4315 ml       Physical Exam:   Physical Exam  Vitals reviewed.   Constitutional:       General: He is not in acute distress.     Appearance: Normal appearance.   HENT:      Head: Normocephalic and atraumatic.      Nose: Nose normal.      Mouth/Throat:      Mouth: Mucous membranes are moist.      Pharynx: Oropharynx is clear.   Eyes:      General: No scleral icterus.     Conjunctiva/sclera: Conjunctivae normal.   Cardiovascular:      Rate and Rhythm: Regular rhythm. Tachycardia present.   Pulmonary:      Effort: Pulmonary effort is normal.      Breath sounds: Normal breath sounds.   Abdominal:      General: Bowel sounds are normal. There is no distension.      Palpations: Abdomen is soft.      Tenderness: There is no abdominal tenderness. There is no guarding.   Musculoskeletal:      Right lower leg: Edema present.      Left lower leg: Edema present.   Skin:     General: Skin is warm and dry.   Neurological:      General: No focal deficit present.      Mental Status: He is alert and oriented to person, place, and time. Mental  status is at baseline.   Psychiatric:         Mood and Affect: Mood normal.          Additional Data:     Labs:  Results from last 7 days   Lab Units 05/05/24  0920 05/04/24  0233 05/03/24  2154   WBC Thousand/uL 6.00  --  6.29   HEMOGLOBIN g/dL 10.4*  --  9.6*   HEMATOCRIT % 33.4*  --  30.9*   PLATELETS Thousands/uL 259   < > 233   SEGS PCT %  --   --  62   LYMPHO PCT %  --   --  25   MONO PCT %  --   --  7   EOS PCT %  --   --  4    < > = values in this interval not displayed.     Results from last 7 days   Lab Units 05/06/24  0556 05/05/24  0920 05/04/24  0521   SODIUM mmol/L 139   < > 140   POTASSIUM mmol/L 4.7   < > 4.7   CHLORIDE mmol/L 107   < > 113*   CO2 mmol/L 23   < > 20*   BUN mg/dL 66*   < > 58*   CREATININE mg/dL 7.91*   < > 6.97*   ANION GAP mmol/L 9   < > 7   CALCIUM mg/dL 8.5   < > 8.1*   ALBUMIN g/dL  --   --  3.1*   TOTAL BILIRUBIN mg/dL  --   --  0.43   ALK PHOS U/L  --   --  138*   ALT U/L  --   --  18   AST U/L  --   --  15   GLUCOSE RANDOM mg/dL 42*   < > 117    < > = values in this interval not displayed.         Results from last 7 days   Lab Units 05/06/24  0708 05/06/24  0651 05/05/24  2048 05/05/24  1604 05/05/24  1056 05/05/24  0739 05/04/24  2017 05/04/24  1622 05/04/24  1057 05/04/24  0728 05/03/24  2108   POC GLUCOSE mg/dl 72 63* 89 126 109 73 136 122 143* 108 191*     Results from last 7 days   Lab Units 05/03/24  1247   HEMOGLOBIN A1C % 7.8*           Lines/Drains:  Invasive Devices       Peripheral Intravenous Line  Duration             Peripheral IV 05/03/24 Left Antecubital 2 days                      Telemetry:  Telemetry Orders (From admission, onward)               24 Hour Telemetry Monitoring  Continuous x 24 Hours (Telem)        Question:  Reason for 24 Hour Telemetry  Answer:  Arrhythmias requiring acute medical intervention / PPM or ICD malfunction                     Telemetry Reviewed: Atrial flutter. HR averaging 100  Indication for Continued Telemetry Use: Arrthymias  requiring medical therapy             Imaging: Reviewed radiology reports from this admission including: chest xray and abdominal/pelvic CT and Personally reviewed the following imaging: chest xray and abdominal/pelvic CT  CT abdomen pelvis wo contrast  Result Date: 5/5/2024  Impression: 1. No obstructive uropathy. Bilateral nonobstructing renal calculi measuring up to 8 mm at the lower pole right kidney. 2. Small bilateral pleural effusions with adjacent compressive atelectasis. Workstation performed: [id number]     XR chest 2 views  Result Date: 5/4/2024  Impression: Severe pulmonary edema with small effusions. Workstation performed: [id number]     Recent Cultures (last 7 days):         Last 24 Hours Medication List:   Current Facility-Administered Medications   Medication Dose Route Frequency Provider Last Rate    acetaminophen  650 mg Oral Q6H PRN Osiris Singh, DO      apixaban  2.5 mg Oral BID SAUL Galicia      bumetanide (BUMEX) 12.5 mg infusion 50 mL  2 mg/hr Intravenous Continuous Joselyn Reyes Bahamonde, MD 2 mg/hr (05/06/24 0528)    cephalexin  250 mg Oral Q12H Counts include [facility] beds at the [facility] Boy Guan MD      insulin glargine  10 Units Subcutaneous Q12H BOBBI Guillermo Rangel, DO      insulin lispro  1-6 Units Subcutaneous TID AC Osiris Singh, DO      labetalol  10 mg Intravenous Q4H PRN Osiris Singh, DO      magnesium sulfate  4 g Intravenous Once Guillermo Rangel, DO      metoprolol tartrate  100 mg Oral Q12H BOBBI SAUL Galicia      NIFEdipine  60 mg Oral Daily Joselyn Reyes Bahamonde, MD      sodium bicarbonate  650 mg Oral BID after meals Joselyn Reyes Bahamonde, MD          Today, Patient Was Seen By: Amanda Briseno MD    **Please Note: This note may have been constructed using a voice recognition system.**

## 2024-05-06 NOTE — ASSESSMENT & PLAN NOTE
Home regimen of Lantus 20 units at lunch and at bedtime    Lab Results   Component Value Date    HGBA1C 7.8 (H) 05/03/2024     Recent Labs     05/06/24  0708 05/06/24  1124 05/06/24  1619 05/07/24  0714   POCGLU 72 168* 156* 76     Blood Sugar Average: Last 72 hrs:  (P) 116.0984647959116151    Patient hypoglycemic to 42 in the AM of 5/6. Pt was asymptomatic  Suspect Lantus 20 units BID was too high a dose in the setting of CEE  Lantus was discontinued, and sliding scale was continued.  Sugars now within acceptable range    Plan:  Continue sliding scale

## 2024-05-06 NOTE — PLAN OF CARE
Problem: PHYSICAL THERAPY ADULT  Goal: Performs mobility at highest level of function for planned discharge setting.  See evaluation for individualized goals.  Description: Treatment/Interventions: Functional transfer training, LE strengthening/ROM, Elevations, Therapeutic exercise, Endurance training, Patient/family training, Equipment eval/education, Bed mobility, Gait training, Spoke to nursing, Spoke to case management    Equipment Recommended: Walker    See flowsheet documentation for full assessment, interventions and recommendations.  Note: Prognosis: Good  Problem List: Decreased strength, Decreased endurance, Impaired balance, Decreased mobility, Decreased safety awareness, Obesity, Pain  Assessment: Pt seen for PT evaluation for mobility assessment & discharge needs. Activity orders: Up and OOB as tolerated. Pt admitted 5/3/2024 w/ flu like symptoms, dx Acute renal failure superimposed on stage 3 chronic kidney disease (HCC). Comorbidities affecting pt's fnxl performance include: DM, CKD, HTN. During PT IE, pt completes transfers with S and inc time/effort, ambulates 80ft with RW and S. Pt declines desire for attempt at ambulation with no AD at this time d/t pain in B lower legs. Pt displays above outlined functional impairments & limitations, and presents below his baseline level of functional mobility. The AM-PAC & Barthel Index outcome tools were used to assist in determining pt safety w/ mobility/self care & appropriate d/c recommendations, see above for scores. Pt is at risk of falls d/t multiple comorbidities, impaired balance, use of ambulatory aid, varying levels of pain , acuity of medical illness, and ongoing medical treatment of primary dx. Pt's clinical presentation is currently unstable/unpredictable as seen in pt's presentation of changing level of pain, increased fall risk, new onset of impairment of functional mobility, decreased endurance, and new onset of weakness. Pt will benefit from  continued PT services in order to address impairments, decrease risk of falls, maximize independence w/ fnxl mobility, & ensure safety w/ mobility for transition to next level of care. Based on pt presentation & impairments, pt would most appropriately benefit from Level III (minimal PT intensity) resources upon d/c.  Barriers to Discharge: Inaccessible home environment (pending pt's progress with elevations as pt has full flight of steps to access his home)     Rehab Resource Intensity Level, PT: III (Minimum Resource Intensity)    See flowsheet documentation for full assessment.

## 2024-05-06 NOTE — ASSESSMENT & PLAN NOTE
Patient was noted to have small scalp abscess on admission in ED.  Was drained.  Continue Keflex (day 3)

## 2024-05-06 NOTE — UTILIZATION REVIEW
NOTIFICATION OF INPATIENT ADMISSION   AUTHORIZATION REQUEST   SERVICING FACILITY:   [place] [address]  Tax ID: [id number]  NPI: [id number]   ATTENDING PROVIDER:  Attending Name and NPI#: Thanh De La Rosa Md [[id number]]  Address: [address] [place] [address]  Phone: [phone]     ADMISSION INFORMATION:  Place of Service: Inpatient [facility]  Place of Service Code: 21  Inpatient Admission Date/Time: 5/4/24 12:12 AM  Discharge Date/Time: No discharge date for patient encounter.  Admitting Diagnosis Code/Description:  Abscess or cellulitis of scalp [L03.811]  Pulmonary edema [J81.1]  SOB (shortness of breath) [R06.02]  Acute chest pain [R07.9]  Acute renal failure (ARF) (HCC) [N17.9]     UTILIZATION REVIEW CONTACT:  Jesica De Leon, Utilization   Network Utilization Review Department  Phone: [phone]  Fax: [phone]  Email: [email or web address]  Contact for approvals/pending authorizations, clinical reviews, and discharge.     PHYSICIAN ADVISORY SERVICES:  Medical Necessity Denial & Amii-iw-Flyr Review  Phone: [phone]  Fax: [phone]  Email: [email or web address]     DISCHARGE SUPPORT TEAM:  For Patients Discharge Needs & Updates  Phone: [phone] opt. 2 Fax: [phone]  Email: [email or web address]

## 2024-05-06 NOTE — ASSESSMENT & PLAN NOTE
Hx of CKD with ARF present on admission    Lab Results   Component Value Date    EGFR 6 05/06/2024    EGFR 6 05/05/2024    EGFR 7 05/04/2024    CREATININE 7.91 (H) 05/06/2024    CREATININE 7.64 (H) 05/05/2024    CREATININE 6.97 (H) 05/04/2024       Plan:  Monitor input and output  Repeat BMP in AM  Nephrology consult, appreciate recommendations  Avoid nephrotoxins

## 2024-05-06 NOTE — PROGRESS NOTES
NEPHROLOGY PROGRESS NOTE   Jerod Worthington 63 y.o. male MRN: [id number]  Unit/Bed#: S -01 Encounter: [id number]  Reason for Consult: CEE      SUMMARY:    63 y.o. man with PMH of CKD G4 follow-up at [facility] but no labs in 2023, diabetic, obese, hypertension, BOBBY, obesity p/w SOB.  Nephrology is consulted for CEE      ASSESSMENT and PLAN:    Acute kidney injury  -- Present on admission with underlying chronic kidney disease stage IV with possible progression of disease  -- No labs in 2023 I suspect some degree of progression.  He has nephrotic range proteinuria.  Possibly in the setting of diabetic kidney disease along with component of cardiorenal syndrome  -- Renal function continues to worsen creatinine up to 7.9 mg/dL.  -- Electrolytes and acid-base status are improved  -- Examines volume overloaded but continuing to have robust diuresis on a Bumex drip  -- Continue Bumex drip but reduce the rate to 1 mg/h  -- If renal function continues to worsen tomorrow we will plan to start hemodialysis which she is agreeable to.    Chronic kidney disease stage IV (G4A3)  -- Presumed secondary to diabetic kidney disease and progression of underlying disease, obesity related renal dysfunction  -- Nephrotic range proteinuria  -- Follow-up phospholipase A2 receptor antibody  -- Baseline creatinine historically high 1's, in 2022, no labs in the last year    Low bicarbonate level--resolved  -- Reduce odium bicarbonate to 2025 mg twice a day    Volume overload  -- In the setting of acute kidney injury/chronic kidney disease  -- Continue Bumex drip and reduce the rate    Diabetes mellitus type 2  -- Hemoglobin A1c 7.8    Hyperkalemia--resolved      SUBJECTIVE / INTERVAL HISTORY:    No chest pain or shortness of breath.    OBJECTIVE:  Current Weight: Weight - Scale:  (blood sugar  43 standing wgt not done until pt sugar is recovered)  Vitals:    05/06/24 0700 05/06/24 0951 05/06/24 1218 05/06/24 1220   BP: (!) 176/106 (!)  176/126 148/89 148/89   Pulse:  (!) 111  (!) 111   Resp:       Temp: 97.5 °F (36.4 °C)      TempSrc:       SpO2:  100%  100%   Weight:       Height:           Intake/Output Summary (Last 24 hours) at 5/6/2024 1347  Last data filed at 5/6/2024 0951  Gross per 24 hour   Intake 390 ml   Output 4625 ml   Net -4235 ml       Review of Systems:    Constitutional: Negative for chills and fever.   HENT: Negative for ear pain and sore throat.    Eyes: Negative for pain and visual disturbance.   Respiratory: Negative for cough and shortness of breath.    Cardiovascular: Negative for chest pain and palpitations.   Gastrointestinal: Negative for abdominal pain and vomiting.   Genitourinary: Negative for dysuria and hematuria.   Musculoskeletal: Negative for arthralgias and back pain.   Skin: Negative for color change and rash.   Neurological: Negative for seizures and syncope.   12 point ROS has been reviewed.    Physical Exam  Vitals and nursing note reviewed.   Constitutional:       General: He is not in acute distress.     Appearance: He is well-developed. He is obese.   HENT:      Head: Normocephalic and atraumatic.   Eyes:      General: No scleral icterus.     Conjunctiva/sclera: Conjunctivae normal.      Pupils: Pupils are equal, round, and reactive to light.   Cardiovascular:      Rate and Rhythm: Normal rate and regular rhythm.      Heart sounds: S1 normal and S2 normal. No murmur heard.     No friction rub. No gallop.   Pulmonary:      Effort: Pulmonary effort is normal. No respiratory distress.      Breath sounds: Normal breath sounds. No wheezing or rales.   Abdominal:      General: Bowel sounds are normal.      Palpations: Abdomen is soft.      Tenderness: There is no abdominal tenderness. There is no rebound.   Musculoskeletal:         General: Normal range of motion.      Cervical back: Normal range of motion and neck supple.      Right lower leg: Edema present.      Left lower leg: Edema present.   Skin:      Findings: No rash.   Neurological:      Mental Status: He is alert and oriented to person, place, and time.   Psychiatric:         Behavior: Behavior normal.         Medications:    Current Facility-Administered Medications:     acetaminophen (TYLENOL) tablet 650 mg, 650 mg, Oral, Q6H PRN, Osiris Singh DO, 650 mg at 05/05/24 0753    apixaban (ELIQUIS) tablet 5 mg, 5 mg, Oral, BID, SAUL Valadez    bumetanide (BUMEX) 12.5 mg infusion 50 mL, 1 mg/hr, Intravenous, Continuous, Wai Preston MD    cephalexin (KEFLEX) capsule 250 mg, 250 mg, Oral, Q12H BOBBI, Boy Guan MD, 250 mg at 05/06/24 0947    diltiazem (CARDIZEM SR) 12 hr capsule 90 mg, 90 mg, Oral, Q12H BOBBI, SAUL Valadez, 90 mg at 05/06/24 1218    insulin lispro (HumALOG/ADMELOG) 100 units/mL subcutaneous injection 1-6 Units, 1-6 Units, Subcutaneous, TID AC, 1 Units at 05/06/24 1218 **AND** Fingerstick Glucose (POCT), , , TID AC, Osiris Singh DO    labetalol (NORMODYNE) injection 10 mg, 10 mg, Intravenous, Q4H PRN, Osiris Singh DO, 10 mg at 05/04/24 2233    metoprolol tartrate (LOPRESSOR) tablet 100 mg, 100 mg, Oral, Q12H BOBBI, SAUL Galicia, 100 mg at 05/06/24 0947    sodium bicarbonate tablet 325 mg, 325 mg, Oral, BID after meals, Wai Preston MD    Laboratory Results:  Results from last 7 days   Lab Units 05/06/24  0556 05/05/24  0920 05/04/24  0521 05/04/24  0233 05/03/24  2154 05/03/24  1247   WBC Thousand/uL  --  6.00  --   --  6.29  --    HEMOGLOBIN g/dL  --  10.4*  --   --  9.6*  --    HEMATOCRIT %  --  33.4*  --   --  30.9*  --    PLATELETS Thousands/uL  --  259  --  223 233  --    POTASSIUM mmol/L 4.7 5.4* 4.7  --  5.0 5.5*   CHLORIDE mmol/L 107 109* 113*  --  111* 113*   CO2 mmol/L 23 20* 20*  --  20* 20*   BUN mg/dL 66* 61* 58*  --  59* 59*   CREATININE mg/dL 7.91* 7.64* 6.97*  --  7.05* 7.25*   CALCIUM mg/dL 8.5 8.9 8.1*  --  8.2* 8.4*   MAGNESIUM mg/dL 1.6*  --   --   --   --   --     PHOSPHORUS mg/dL  --  4.9*  --   --   --   --        PLEASE NOTE:  This encounter was completed utilizing the [email or web address] Direct Speech Voice Recognition Software. Grammatical errors, random word insertions, pronoun errors and incomplete sentences are occasional consequences of the system due to software limitations, ambient noise and hardware issues.These may be missed by proof reading prior to affixing electronic signature. Any questions or concerns about the content, text or information contained within the body of this dictation should be directly addressed to the physician for clarification. Please do not hesitate to call me directly if you have any any questions or concerns.

## 2024-05-06 NOTE — OCCUPATIONAL THERAPY NOTE
Occupational Therapy Evaluation     Patient Name: Jerod Worthington  Today's Date: 5/6/2024  Problem List  Principal Problem:    Acute renal failure superimposed on stage 3 chronic kidney disease (HCC)  Active Problems:    Severe obstructive sleep apnea    Type 2 diabetes mellitus with hyperglycemia, with long-term current use of insulin (HCC)    Stage 3b chronic kidney disease (HCC)    Essential hypertension    Hypertensive emergency    Scalp abscess    Past Medical History  History reviewed. No pertinent past medical history.  Past Surgical History  History reviewed. No pertinent surgical history.          05/06/24 1056   OT Last Visit   OT Visit Date 05/06/24   Note Type   Note type Evaluation   Additional Comments interpretor # [id number]   Pain Assessment   Pain Assessment Tool 0-10   Pain Score No Pain   Restrictions/Precautions   Weight Bearing Precautions Per Order No   Other Precautions Chair Alarm;Bed Alarm;Multiple lines;Fall Risk  (language barrier - use of )   Home Living   Type of Home House   Home Layout Two level;Bed/bath upstairs  (10 FADIA)   Bathroom Accessibility Accessible   Home Equipment   (use of 2L O2 at night)   Additional Comments no use of AD at baseline   Prior Function   Level of [place] Independent with ADLs   Lives With Daughter  (+ granddaughter)   Receives Help From Family  (dtr is home during the daytime)   IADLs Independent with driving;Independent with meal prep;Independent with medication management   Falls in the last 6 months 0   Vocational Unemployed   Lifestyle   Autonomy PTA pt living with daughter in 2SH, pt (I) with ADLs and IADLs , (-)falls, (-)drives, no use of AD at baseline   Reciprocal Relationships supportive daughter is able to assist as needed   Service to Others unemployed   Intrinsic Gratification enjoys spending time with his family and cooking   General   Additional Pertinent History Admit due to SOB, PMH of HTN, HLD, BOBBY, stage IIIb CKD, and T2DM  "  Family/Caregiver Present No   Subjective   Subjective \"I want to be cooking all of everything\"   ADL   Eating Assistance 7  Independent   Grooming Assistance 7  Independent   UB Bathing Assistance 6  Modified Independent   LB Bathing Assistance 5  Supervision/Setup   UB Dressing Assistance 6  Modified independent   LB Dressing Assistance 4  Minimal Assistance   LB Dressing Deficit Increased time to complete;Supervision/safety;Verbal cueing;Don/doff R sock;Don/doff L sock   Toileting Assistance  5  Supervision/Setup   Bed Mobility   Additional Comments OOB and in chair at start and end of session   Transfers   Sit to Stand 5  Supervision   Additional items Increased time required;Verbal cues   Stand to Sit 5  Supervision   Additional items Increased time required;Verbal cues   Additional Comments use of RW, increased time for achieving full standing   Functional Mobility   Functional Mobility 5  Supervision   Additional Comments functional household distance, x1 LOB, able to self correct   Additional items Rolling walker   Balance   Static Sitting Good   Dynamic Sitting Fair +   Static Standing Fair   Dynamic Standing Fair -   Ambulatory Fair -   Activity Tolerance   Activity Tolerance Patient tolerated treatment well   Medical Staff Made Aware PT Rosmery, MARGE Gardner   RUTORY Assessment   RUE Assessment WFL   LUE Assessment   LUE Assessment WFL   Hand Function   Gross Motor Coordination Functional   Fine Motor Coordination Functional   Cognition   Overall Cognitive Status WFL   Arousal/Participation Alert;Cooperative   Attention Within functional limits   Orientation Level Oriented X4   Memory Within functional limits   Following Commands Follows one step commands without difficulty   Comments pleasant and cooperative, mainly [place] speaking. Use of    Assessment   Limitation Decreased Safe judgement during ADL;Decreased endurance;Decreased high-level ADLs  (impaired fxnl mobility, act re, fxnl reach, standing " re, strength, pacing)   Prognosis Good   Assessment Pt is a 63 y.o. male seen for OT evaluation s/p admission to [facility] on 5/3/2024 due to SOB. Diagnosed with Acute renal failure superimposed on stage 3 chronic kidney disease (HCC). Personal and env factors supporting pt at time of IE include age, (I) PLOF, and supportive daughter . Personal and env factors inhibiting engagement in occupations include  FADIA home . Performance deficits that affect the pt’s occupational performance can be seen above. Despite pt's current functional limitations and medical complications pt is functioning near his baseline and has adequate support at home for ADLs as needed. No further acute OT needs identified at this time. Recommend continued active ADL participation and mobilization with hospital staff while in the hospital to increase pt’s endurance and strength upon D/C. From OT standpoint, recommend D/C to home with family support when medically cleared. D/C pt from OT caseload at this time.   Goals   Patient Goals to go home   Plan   OT Frequency Eval only   Discharge Recommendation   Rehab Resource Intensity Level, OT No post-acute rehabilitation needs  (recommend home with family support)   AM-PAC Daily Activity Inpatient   Lower Body Dressing 3   Bathing 3   Toileting 4   Upper Body Dressing 4   Grooming 4   Eating 4   Daily Activity Raw Score 22   Daily Activity Standardized Score (Calc for Raw Score >=11) 47.1   AM-PAC Applied Cognition Inpatient   Following a Speech/Presentation 4   Understanding Ordinary Conversation 4   Taking Medications 4   Remembering Where Things Are Placed or Put Away 4   Remembering List of 4-5 Errands 4   Taking Care of Complicated Tasks 4   Applied Cognition Raw Score 24   Applied Cognition Standardized Score 62.21   End of Consult   Patient Position at End of Consult Bedside chair;Bed/Chair alarm activated;All needs within reach       Pt with no further acute OT needs, will d/c from OT services at  this time. If functional status declines please re-consult      The patient's raw score on the AM-PAC Daily Activity Inpatient Short Form is 22. A raw score of greater than or equal to 19 suggests the patient may benefit from discharge to home. Please refer to the recommendation of the Occupational Therapist for safe discharge planning.    This session, pt required and most appropriately benefited from skilled OT/PT co-eval due to  anticipated regression from baseline . OT and PT goals were addressed separately as seen in documentation.     Chantale Salas MS, OTR/L

## 2024-05-06 NOTE — ASSESSMENT & PLAN NOTE
Patient was noted to have small scalp abscess on admission in ED.  Was drained.  Continue Keflex (day 4)

## 2024-05-07 ENCOUNTER — APPOINTMENT (INPATIENT)
Dept: RADIOLOGY | Facility: HOSPITAL | Age: 64
DRG: 469 | End: 2024-05-07
Attending: RADIOLOGY
Payer: MEDICARE

## 2024-05-07 ENCOUNTER — APPOINTMENT (INPATIENT)
Dept: RADIOLOGY | Facility: HOSPITAL | Age: 64
DRG: 469 | End: 2024-05-07
Payer: MEDICARE

## 2024-05-07 ENCOUNTER — APPOINTMENT (INPATIENT)
Dept: DIALYSIS | Facility: HOSPITAL | Age: 64
DRG: 469 | End: 2024-05-07
Payer: MEDICARE

## 2024-05-07 PROBLEM — I48.92 ATRIAL FLUTTER (HCC): Status: ACTIVE | Noted: 2024-05-07

## 2024-05-07 PROBLEM — I45.5 SINUS PAUSE: Status: ACTIVE | Noted: 2024-05-07

## 2024-05-07 LAB
ALBUMIN SERPL BCP-MCNC: 3.2 G/DL (ref 3.5–5)
ALP SERPL-CCNC: 125 U/L (ref 34–104)
ALT SERPL W P-5'-P-CCNC: 17 U/L (ref 7–52)
ANION GAP SERPL CALCULATED.3IONS-SCNC: 10 MMOL/L (ref 4–13)
ANION GAP SERPL CALCULATED.3IONS-SCNC: 12 MMOL/L (ref 4–13)
ANION GAP SERPL CALCULATED.3IONS-SCNC: 13 MMOL/L (ref 4–13)
AST SERPL W P-5'-P-CCNC: 15 U/L (ref 13–39)
BASOPHILS # BLD AUTO: 0.03 THOUSANDS/ÂΜL (ref 0–0.1)
BASOPHILS NFR BLD AUTO: 0 % (ref 0–1)
BILIRUB SERPL-MCNC: 0.29 MG/DL (ref 0.2–1)
BUN SERPL-MCNC: 74 MG/DL (ref 5–25)
BUN SERPL-MCNC: 80 MG/DL (ref 5–25)
BUN SERPL-MCNC: 84 MG/DL (ref 5–25)
CA-I BLD-SCNC: 1.03 MMOL/L (ref 1.12–1.32)
CALCIUM ALBUM COR SERPL-MCNC: 9.3 MG/DL (ref 8.3–10.1)
CALCIUM SERPL-MCNC: 8.1 MG/DL (ref 8.4–10.2)
CALCIUM SERPL-MCNC: 8.5 MG/DL (ref 8.4–10.2)
CALCIUM SERPL-MCNC: 8.7 MG/DL (ref 8.4–10.2)
CHLORIDE SERPL-SCNC: 101 MMOL/L (ref 96–108)
CHLORIDE SERPL-SCNC: 101 MMOL/L (ref 96–108)
CHLORIDE SERPL-SCNC: 105 MMOL/L (ref 96–108)
CO2 SERPL-SCNC: 18 MMOL/L (ref 21–32)
CO2 SERPL-SCNC: 19 MMOL/L (ref 21–32)
CO2 SERPL-SCNC: 22 MMOL/L (ref 21–32)
CREAT SERPL-MCNC: 8.89 MG/DL (ref 0.6–1.3)
CREAT SERPL-MCNC: 9.02 MG/DL (ref 0.6–1.3)
CREAT SERPL-MCNC: 9.44 MG/DL (ref 0.6–1.3)
EOSINOPHIL # BLD AUTO: 0.06 THOUSAND/ÂΜL (ref 0–0.61)
EOSINOPHIL NFR BLD AUTO: 1 % (ref 0–6)
ERYTHROCYTE [DISTWIDTH] IN BLOOD BY AUTOMATED COUNT: 14.1 % (ref 11.6–15.1)
GFR SERPL CREATININE-BSD FRML MDRD: 5 ML/MIN/1.73SQ M
GLUCOSE SERPL-MCNC: 125 MG/DL (ref 65–140)
GLUCOSE SERPL-MCNC: 131 MG/DL (ref 65–140)
GLUCOSE SERPL-MCNC: 133 MG/DL (ref 65–140)
GLUCOSE SERPL-MCNC: 156 MG/DL (ref 65–140)
GLUCOSE SERPL-MCNC: 160 MG/DL (ref 65–140)
GLUCOSE SERPL-MCNC: 193 MG/DL (ref 65–140)
GLUCOSE SERPL-MCNC: 76 MG/DL (ref 65–140)
GLUCOSE SERPL-MCNC: 92 MG/DL (ref 65–140)
HCT VFR BLD AUTO: 30.2 % (ref 36.5–49.3)
HGB BLD-MCNC: 9.8 G/DL (ref 12–17)
IMM GRANULOCYTES # BLD AUTO: 0.04 THOUSAND/UL (ref 0–0.2)
IMM GRANULOCYTES NFR BLD AUTO: 1 % (ref 0–2)
INR PPP: 1.35 (ref 0.84–1.19)
LACTATE SERPL-SCNC: 1.3 MMOL/L (ref 0.5–2)
LYMPHOCYTES # BLD AUTO: 1.02 THOUSANDS/ÂΜL (ref 0.6–4.47)
LYMPHOCYTES NFR BLD AUTO: 12 % (ref 14–44)
MAGNESIUM SERPL-MCNC: 2.4 MG/DL (ref 1.9–2.7)
MAGNESIUM SERPL-MCNC: 2.4 MG/DL (ref 1.9–2.7)
MCH RBC QN AUTO: 32 PG (ref 26.8–34.3)
MCHC RBC AUTO-ENTMCNC: 32.5 G/DL (ref 31.4–37.4)
MCV RBC AUTO: 99 FL (ref 82–98)
MONOCYTES # BLD AUTO: 0.31 THOUSAND/ÂΜL (ref 0.17–1.22)
MONOCYTES NFR BLD AUTO: 4 % (ref 4–12)
NEUTROPHILS # BLD AUTO: 6.9 THOUSANDS/ÂΜL (ref 1.85–7.62)
NEUTS SEG NFR BLD AUTO: 82 % (ref 43–75)
NRBC BLD AUTO-RTO: 0 /100 WBCS
PHOSPHATE SERPL-MCNC: 5.9 MG/DL (ref 2.3–4.1)
PLATELET # BLD AUTO: 263 THOUSANDS/UL (ref 149–390)
PMV BLD AUTO: 9.6 FL (ref 8.9–12.7)
POTASSIUM SERPL-SCNC: 5.3 MMOL/L (ref 3.5–5.3)
POTASSIUM SERPL-SCNC: 5.4 MMOL/L (ref 3.5–5.3)
POTASSIUM SERPL-SCNC: 7.3 MMOL/L (ref 3.5–5.3)
PROT SERPL-MCNC: 7 G/DL (ref 6.4–8.4)
PROTHROMBIN TIME: 17.4 SECONDS (ref 11.6–14.5)
RBC # BLD AUTO: 3.06 MILLION/UL (ref 3.88–5.62)
SODIUM SERPL-SCNC: 132 MMOL/L (ref 135–147)
SODIUM SERPL-SCNC: 132 MMOL/L (ref 135–147)
SODIUM SERPL-SCNC: 137 MMOL/L (ref 135–147)
WBC # BLD AUTO: 8.36 THOUSAND/UL (ref 4.31–10.16)

## 2024-05-07 PROCEDURE — NC001 PR NO CHARGE: Performed by: PHYSICIAN ASSISTANT

## 2024-05-07 PROCEDURE — 85610 PROTHROMBIN TIME: CPT

## 2024-05-07 PROCEDURE — 36558 INSERT TUNNELED CV CATH: CPT

## 2024-05-07 PROCEDURE — 87340 HEPATITIS B SURFACE AG IA: CPT | Performed by: INTERNAL MEDICINE

## 2024-05-07 PROCEDURE — 82948 REAGENT STRIP/BLOOD GLUCOSE: CPT

## 2024-05-07 PROCEDURE — 99232 SBSQ HOSP IP/OBS MODERATE 35: CPT | Performed by: INTERNAL MEDICINE

## 2024-05-07 PROCEDURE — 5A1D70Z PERFORMANCE OF URINARY FILTRATION, INTERMITTENT, LESS THAN 6 HOURS PER DAY: ICD-10-PCS | Performed by: INTERNAL MEDICINE

## 2024-05-07 PROCEDURE — 82330 ASSAY OF CALCIUM: CPT | Performed by: PHYSICIAN ASSISTANT

## 2024-05-07 PROCEDURE — 83735 ASSAY OF MAGNESIUM: CPT

## 2024-05-07 PROCEDURE — 76937 US GUIDE VASCULAR ACCESS: CPT

## 2024-05-07 PROCEDURE — 80048 BASIC METABOLIC PNL TOTAL CA: CPT

## 2024-05-07 PROCEDURE — 0JH63XZ INSERTION OF TUNNELED VASCULAR ACCESS DEVICE INTO CHEST SUBCUTANEOUS TISSUE AND FASCIA, PERCUTANEOUS APPROACH: ICD-10-PCS | Performed by: RADIOLOGY

## 2024-05-07 PROCEDURE — 85025 COMPLETE CBC W/AUTO DIFF WBC: CPT | Performed by: PHYSICIAN ASSISTANT

## 2024-05-07 PROCEDURE — 94760 N-INVAS EAR/PLS OXIMETRY 1: CPT

## 2024-05-07 PROCEDURE — 87081 CULTURE SCREEN ONLY: CPT | Performed by: INTERNAL MEDICINE

## 2024-05-07 PROCEDURE — 83735 ASSAY OF MAGNESIUM: CPT | Performed by: PHYSICIAN ASSISTANT

## 2024-05-07 PROCEDURE — 84100 ASSAY OF PHOSPHORUS: CPT | Performed by: PHYSICIAN ASSISTANT

## 2024-05-07 PROCEDURE — 99255 IP/OBS CONSLTJ NEW/EST HI 80: CPT | Performed by: INTERNAL MEDICINE

## 2024-05-07 PROCEDURE — 86706 HEP B SURFACE ANTIBODY: CPT | Performed by: INTERNAL MEDICINE

## 2024-05-07 PROCEDURE — 83605 ASSAY OF LACTIC ACID: CPT | Performed by: PHYSICIAN ASSISTANT

## 2024-05-07 PROCEDURE — 77001 FLUOROGUIDE FOR VEIN DEVICE: CPT

## 2024-05-07 PROCEDURE — 86803 HEPATITIS C AB TEST: CPT | Performed by: INTERNAL MEDICINE

## 2024-05-07 PROCEDURE — 94640 AIRWAY INHALATION TREATMENT: CPT

## 2024-05-07 PROCEDURE — 77001 FLUOROGUIDE FOR VEIN DEVICE: CPT | Performed by: RADIOLOGY

## 2024-05-07 PROCEDURE — 99233 SBSQ HOSP IP/OBS HIGH 50: CPT | Performed by: INTERNAL MEDICINE

## 2024-05-07 PROCEDURE — 80053 COMPREHEN METABOLIC PANEL: CPT | Performed by: PHYSICIAN ASSISTANT

## 2024-05-07 PROCEDURE — 86704 HEP B CORE ANTIBODY TOTAL: CPT | Performed by: INTERNAL MEDICINE

## 2024-05-07 PROCEDURE — 02H633Z INSERTION OF INFUSION DEVICE INTO RIGHT ATRIUM, PERCUTANEOUS APPROACH: ICD-10-PCS | Performed by: RADIOLOGY

## 2024-05-07 PROCEDURE — C1894 INTRO/SHEATH, NON-LASER: HCPCS

## 2024-05-07 PROCEDURE — 86705 HEP B CORE ANTIBODY IGM: CPT | Performed by: INTERNAL MEDICINE

## 2024-05-07 PROCEDURE — 36558 INSERT TUNNELED CV CATH: CPT | Performed by: RADIOLOGY

## 2024-05-07 PROCEDURE — 71045 X-RAY EXAM CHEST 1 VIEW: CPT

## 2024-05-07 PROCEDURE — C1750 CATH, HEMODIALYSIS,LONG-TERM: HCPCS

## 2024-05-07 PROCEDURE — 76937 US GUIDE VASCULAR ACCESS: CPT | Performed by: RADIOLOGY

## 2024-05-07 PROCEDURE — 93005 ELECTROCARDIOGRAM TRACING: CPT

## 2024-05-07 RX ORDER — ATROPINE SULFATE 0.1 MG/ML
0.5 INJECTION INTRAVENOUS ONCE
Status: DISCONTINUED | OUTPATIENT
Start: 2024-05-07 | End: 2024-05-08

## 2024-05-07 RX ORDER — CEFAZOLIN SODIUM 2 G/50ML
SOLUTION INTRAVENOUS
Status: DISCONTINUED | OUTPATIENT
Start: 2024-05-07 | End: 2024-05-08

## 2024-05-07 RX ORDER — ALBUTEROL SULFATE 2.5 MG/3ML
10 SOLUTION RESPIRATORY (INHALATION) ONCE
Status: COMPLETED | OUTPATIENT
Start: 2024-05-07 | End: 2024-05-07

## 2024-05-07 RX ORDER — CHLORHEXIDINE GLUCONATE ORAL RINSE 1.2 MG/ML
15 SOLUTION DENTAL EVERY 12 HOURS SCHEDULED
Status: DISCONTINUED | OUTPATIENT
Start: 2024-05-07 | End: 2024-05-15 | Stop reason: HOSPADM

## 2024-05-07 RX ORDER — DEXTROSE MONOHYDRATE 25 G/50ML
25 INJECTION, SOLUTION INTRAVENOUS ONCE
Status: COMPLETED | OUTPATIENT
Start: 2024-05-07 | End: 2024-05-07

## 2024-05-07 RX ORDER — CALCIUM GLUCONATE 20 MG/ML
2 INJECTION, SOLUTION INTRAVENOUS ONCE
Status: COMPLETED | OUTPATIENT
Start: 2024-05-07 | End: 2024-05-07

## 2024-05-07 RX ORDER — BUMETANIDE 0.25 MG/ML
1 INJECTION INTRAMUSCULAR; INTRAVENOUS CONTINUOUS
Status: DISCONTINUED | OUTPATIENT
Start: 2024-05-07 | End: 2024-05-07

## 2024-05-07 RX ORDER — ONDANSETRON 2 MG/ML
INJECTION INTRAMUSCULAR; INTRAVENOUS AS NEEDED
Status: DISCONTINUED | OUTPATIENT
Start: 2024-05-07 | End: 2024-05-08

## 2024-05-07 RX ORDER — CALCIUM GLUCONATE 20 MG/ML
1 INJECTION, SOLUTION INTRAVENOUS ONCE
Status: COMPLETED | OUTPATIENT
Start: 2024-05-07 | End: 2024-05-07

## 2024-05-07 RX ORDER — FENTANYL CITRATE 50 UG/ML
INJECTION, SOLUTION INTRAMUSCULAR; INTRAVENOUS AS NEEDED
Status: DISCONTINUED | OUTPATIENT
Start: 2024-05-07 | End: 2024-05-08

## 2024-05-07 RX ORDER — METOPROLOL TARTRATE 100 MG/1
100 TABLET ORAL EVERY 12 HOURS SCHEDULED
Status: DISCONTINUED | OUTPATIENT
Start: 2024-05-07 | End: 2024-05-15 | Stop reason: HOSPADM

## 2024-05-07 RX ORDER — ONDANSETRON 2 MG/ML
4 INJECTION INTRAMUSCULAR; INTRAVENOUS EVERY 6 HOURS PRN
Status: DISCONTINUED | OUTPATIENT
Start: 2024-05-07 | End: 2024-05-07

## 2024-05-07 RX ADMIN — DILTIAZEM HYDROCHLORIDE 90 MG: 90 CAPSULE, EXTENDED RELEASE ORAL at 08:55

## 2024-05-07 RX ADMIN — SODIUM ZIRCONIUM CYCLOSILICATE 10 G: 10 POWDER, FOR SUSPENSION ORAL at 19:57

## 2024-05-07 RX ADMIN — Medication 1 MG/HR: at 19:16

## 2024-05-07 RX ADMIN — INSULIN HUMAN 10 UNITS: 100 INJECTION, SOLUTION PARENTERAL at 19:55

## 2024-05-07 RX ADMIN — Medication 10 ML: at 14:56

## 2024-05-07 RX ADMIN — FENTANYL CITRATE 25 MCG: 50 INJECTION INTRAMUSCULAR; INTRAVENOUS at 14:54

## 2024-05-07 RX ADMIN — APIXABAN 5 MG: 5 TABLET, FILM COATED ORAL at 08:52

## 2024-05-07 RX ADMIN — FENTANYL CITRATE 25 MCG: 50 INJECTION INTRAMUSCULAR; INTRAVENOUS at 14:42

## 2024-05-07 RX ADMIN — APIXABAN 5 MG: 5 TABLET, FILM COATED ORAL at 17:38

## 2024-05-07 RX ADMIN — ONDANSETRON 4 MG: 2 INJECTION INTRAMUSCULAR; INTRAVENOUS at 15:03

## 2024-05-07 RX ADMIN — DEXTROSE MONOHYDRATE 25 ML: 25 INJECTION, SOLUTION INTRAVENOUS at 19:54

## 2024-05-07 RX ADMIN — ALBUTEROL SULFATE 10 MG: 2.5 SOLUTION RESPIRATORY (INHALATION) at 19:17

## 2024-05-07 RX ADMIN — SODIUM BICARBONATE 650 MG TABLET 325 MG: at 17:38

## 2024-05-07 RX ADMIN — CEPHALEXIN 250 MG: 250 CAPSULE ORAL at 22:04

## 2024-05-07 RX ADMIN — METOPROLOL TARTRATE 100 MG: 100 TABLET, FILM COATED ORAL at 22:04

## 2024-05-07 RX ADMIN — METOPROLOL TARTRATE 100 MG: 100 TABLET, FILM COATED ORAL at 08:53

## 2024-05-07 RX ADMIN — CALCIUM GLUCONATE 1 G: 20 INJECTION, SOLUTION INTRAVENOUS at 19:52

## 2024-05-07 RX ADMIN — CALCIUM GLUCONATE 2 G: 20 INJECTION, SOLUTION INTRAVENOUS at 17:35

## 2024-05-07 RX ADMIN — CEPHALEXIN 250 MG: 250 CAPSULE ORAL at 08:53

## 2024-05-07 RX ADMIN — CHLOROTHIAZIDE SODIUM 500 MG: 500 INJECTION, POWDER, LYOPHILIZED, FOR SOLUTION INTRAVENOUS at 20:00

## 2024-05-07 RX ADMIN — ONDANSETRON 4 MG: 2 INJECTION INTRAMUSCULAR; INTRAVENOUS at 16:17

## 2024-05-07 RX ADMIN — SODIUM BICARBONATE 650 MG TABLET 325 MG: at 08:52

## 2024-05-07 RX ADMIN — CEFAZOLIN SODIUM 2000 MG: 2 SOLUTION INTRAVENOUS at 14:31

## 2024-05-07 RX ADMIN — CHLORHEXIDINE GLUCONATE 15 ML: 1.2 RINSE ORAL at 22:04

## 2024-05-07 NOTE — PROGRESS NOTES
Formerly [facility], [facility]  Progress Note  Name: Jerod Worthington I  MRN: [id number]  Unit/Bed#: S -01 I Date of Admission: 5/3/2024   Date of Service: 5/7/2024 I Hospital Day: 3    Assessment/Plan   * Acute renal failure superimposed on stage 3 chronic kidney disease (HCC)  Assessment & Plan  POA  Patient with history of stage IIIb CKD  Previously known baseline creatinine of 1.9-2     Recent Labs     05/06/24  0556 05/06/24  1819 05/07/24  0453   CREATININE 7.91* 8.25* 8.89*   EGFR 6 6 5     Estimated Creatinine Clearance: 9.4 mL/min (A) (by C-G formula based on SCr of 8.89 mg/dL (H)).    -Patient placed on Bumex drip 2mg/hr per nephrology with urine output of 5,175 over 24h by the morning of 5/6  -Sodium bicarb decreased to 325mg BID on 5/6, as acid-base balance improved  -Nephrology decreased Bumex drip to 1mg/hr on 5/6, with  over 24h by the morning of 5/7  -Urine output decreased from previous day, while on Bumex drip    Plan:  Currently on Bumex drip 1mg/hr and sodium bicarb 325mg per nephrology   As creatinine continues to worsen, nephrology planning to initiate dialysis  NPO pending permacath placement, IR consulted   Nephrology consulted appreciate recommendations      Atrial flutter (HCC)  Assessment & Plan  New-onset atrial flutter  Rates controlled, 65    Plan:  Continue Eliquis 5mg BID  Continue Cardizem 90mg BID  Continue Metoprolol 100mg BID  Continue Tele  Cardiology consulted, appreciate recommendations     Scalp abscess  Assessment & Plan  Patient was noted to have small scalp abscess on admission in ED.  Was drained.  Continue Keflex (day 4)    Hypertensive emergency  Assessment & Plan  Initially elevated on admission.  Now improved.  Continue metoprolol 100mg BID, cardizem 90mg BID, patient on Bumex drip, and as needed labetalol.        Essential hypertension  Assessment & Plan  Presented with chest pain and shortness of breath  -No home medications reported  Meeting criteria  "for hypertensive emergency in ED with BP of 193/120  -Nitroglycerin was administered in the ED, with improvement in BP  -Patient on Bumex drip and Metoprolol 100 mg BID    Plan:  Continue monitoring BP  Continue Cardizem 90mg BID  Labetolol PRN for SBP >180  Cardiology consulted, appreciate recommendations      Stage 3b chronic kidney disease (HCC)  Assessment & Plan  Hx of CKD with ARF present on admission    Lab Results   Component Value Date    EGFR 5 05/07/2024    EGFR 6 05/06/2024    EGFR 6 05/06/2024    CREATININE 8.89 (H) 05/07/2024    CREATININE 8.25 (H) 05/06/2024    CREATININE 7.91 (H) 05/06/2024       Plan:  Monitor input and output  Monitor BMP  See \"Acute Renal Failure\" for complete plan  Nephrology consult, appreciate recommendations  Avoid nephrotoxins    Type 2 diabetes mellitus with hyperglycemia, with long-term current use of insulin ([facility])  Assessment & Plan  Home regimen of Lantus 20 units at lunch and at bedtime    Lab Results   Component Value Date    HGBA1C 7.8 (H) 05/03/2024     Recent Labs     05/06/24  0708 05/06/24  1124 05/06/24  1619 05/07/24  0714   POCGLU 72 168* 156* 76     Blood Sugar Average: Last 72 hrs:  (P) 116.1753775370090182    Patient hypoglycemic to 42 in the AM of 5/6. Pt was asymptomatic  Suspect Lantus 20 units BID was too high a dose in the setting of CEE  Lantus was discontinued, and sliding scale was continued.  Sugars now within acceptable range    Plan:  Continue sliding scale     Severe obstructive sleep apnea  Assessment & Plan  Hx of BOBBY with CPAP use at home  - patient endorse nightly use  Patient was started on BiPAP for respiratory distress in ED  Patient now off BiPAP    Plan:  Continue CPAP nightly           VTE Pharmacologic Prophylaxis: VTE Score: 4 Moderate Risk (Score 3-4) - Pharmacological DVT Prophylaxis Ordered: apixaban (Eliquis).    Mobility:   Basic Mobility Inpatient Raw Score: 16  JH-HLM Goal: 5: Stand one or more mins  JH-HLM Achieved: 4: Move to " chair/commode  -[facility] Goal achieved. Continue to encourage appropriate mobility.    Patient Centered Rounds:  Will contact RN to join during rounds  Discussions with Specialists or Other Care Team Provider: Cardiology, Nephrology notes reviewed    Education and Discussions with Family / Patient: Patient declined call to .     Current Length of Stay: 3 day(s)  Current Patient Status: Inpatient   Discharge Plan: Anticipate discharge in 48-72 hrs to home.    Code Status: Level 1 - Full Code    Subjective:   History was obtained using [employer] iPad  in [place].    Patient was seen and examined sitting up in his chair this morning.  The patient says he is feeling a little bit better today.  He thinks his legs are less swollen than yesterday.  Patient denies fever, chills, chest pain, shortness of breath, abdominal pain, nausea, vomiting, diarrhea, constipation, changes in urination.  All questions answered.    Objective:     Vitals:   Temp (24hrs), Av.9 °F (36.6 °C), Min:97.6 °F (36.4 °C), Max:98.1 °F (36.7 °C)    Temp:  [97.6 °F (36.4 °C)-98.1 °F (36.7 °C)] 97.6 °F (36.4 °C)  HR:  [63-83] 63  Resp:  [14-18] 14  BP: ()/() 130/79  SpO2:  [92 %-100 %] 92 %  Body mass index is 38.03 kg/m².     Input and Output Summary (last 24 hours):     Intake/Output Summary (Last 24 hours) at 2024 1408  Last data filed at 2024 0601  Gross per 24 hour   Intake 240 ml   Output 125 ml   Net 115 ml       Physical Exam:   Physical Exam  Vitals reviewed.   Constitutional:       Appearance: Normal appearance.   HENT:      Head: Normocephalic.      Right Ear: External ear normal.      Left Ear: External ear normal.      Nose: Nose normal.      Mouth/Throat:      Mouth: Mucous membranes are moist.      Pharynx: Oropharynx is clear.   Eyes:      General: No scleral icterus.  Cardiovascular:      Rate and Rhythm: Normal rate and regular rhythm.      Pulses: Normal pulses.      Heart sounds: Normal  heart sounds. No murmur heard.  Pulmonary:      Effort: Pulmonary effort is normal.      Breath sounds: Rales (mild at bases bilaterally) present.   Abdominal:      General: Bowel sounds are normal. There is no distension.      Palpations: Abdomen is soft.      Tenderness: There is no abdominal tenderness. There is no guarding.   Musculoskeletal:      Cervical back: Neck supple.      Right lower leg: Edema present.      Left lower leg: Edema present.   Skin:     General: Skin is warm and dry.   Neurological:      General: No focal deficit present.      Mental Status: He is alert. Mental status is at baseline.   Psychiatric:         Mood and Affect: Mood normal.        Additional Data:     Labs:  Results from last 7 days   Lab Units 05/05/24  0920 05/04/24  0233 05/03/24  2154   WBC Thousand/uL 6.00  --  6.29   HEMOGLOBIN g/dL 10.4*  --  9.6*   HEMATOCRIT % 33.4*  --  30.9*   PLATELETS Thousands/uL 259   < > 233   SEGS PCT %  --   --  62   LYMPHO PCT %  --   --  25   MONO PCT %  --   --  7   EOS PCT %  --   --  4    < > = values in this interval not displayed.     Results from last 7 days   Lab Units 05/07/24  0453 05/05/24  0920 05/04/24  0521   SODIUM mmol/L 137   < > 140   POTASSIUM mmol/L 5.3   < > 4.7   CHLORIDE mmol/L 105   < > 113*   CO2 mmol/L 22   < > 20*   BUN mg/dL 74*   < > 58*   CREATININE mg/dL 8.89*   < > 6.97*   ANION GAP mmol/L 10   < > 7   CALCIUM mg/dL 8.1*   < > 8.1*   ALBUMIN g/dL  --   --  3.1*   TOTAL BILIRUBIN mg/dL  --   --  0.43   ALK PHOS U/L  --   --  138*   ALT U/L  --   --  18   AST U/L  --   --  15   GLUCOSE RANDOM mg/dL 92   < > 117    < > = values in this interval not displayed.         Results from last 7 days   Lab Units 05/07/24  1114 05/07/24  0714 05/06/24  1619 05/06/24  1124 05/06/24  0708 05/06/24  0651 05/05/24  2048 05/05/24  1604 05/05/24  1056 05/05/24  0739 05/04/24 2017 05/04/24  1622   POC GLUCOSE mg/dl 125 76 156* 168* 72 63* 89 126 109 73 136 122     Results from  last 7 days   Lab Units 05/03/24  1247   HEMOGLOBIN A1C % 7.8*           Lines/Drains:  Invasive Devices       Peripheral Intravenous Line  Duration             Peripheral IV 05/03/24 Left Antecubital 3 days    Peripheral IV 05/07/24 Right;Lower Arm <1 day                      Telemetry:  Telemetry Orders (From admission, onward)               24 Hour Telemetry Monitoring  Continuous x 24 Hours (Telem)        Question:  Reason for 24 Hour Telemetry  Answer:  Arrhythmias requiring acute medical intervention / PPM or ICD malfunction                     Telemetry Reviewed: Atrial flutter. HR averaging 65  Indication for Continued Telemetry Use: Arrthymias requiring medical therapy             Imaging: Reviewed radiology reports from this admission including: chest xray, abdominal/pelvic CT, and CT head  CT head wo contrast  Result Date: 5/6/2024  Impression: No acute intracranial hemorrhage or large mass effect. If there is concern for acute infarct, MRI is a more sensitive examination. Workstation performed: [id number]     CT abdomen pelvis wo contrast  Result Date: 5/5/2024  Impression: 1. No obstructive uropathy. Bilateral nonobstructing renal calculi measuring up to 8 mm at the lower pole right kidney. 2. Small bilateral pleural effusions with adjacent compressive atelectasis. Workstation performed: [id number]     XR chest 2 views  Result Date: 5/4/2024  Impression: Severe pulmonary edema with small effusions. Workstation performed: [id number]     Recent Cultures (last 7 days):         Last 24 Hours Medication List:   Current Facility-Administered Medications   Medication Dose Route Frequency Provider Last Rate    acetaminophen  650 mg Oral Q6H PRN Osiris Singh DO      apixaban  5 mg Oral BID SAUL Valadez      bumetanide (BUMEX) 12.5 mg infusion 50 mL  1 mg/hr Intravenous Continuous Wai Preston MD 1 mg/hr (05/07/24 0859)    cephalexin  250 mg Oral Q12H [facility] Stan[facility] Boy Guan MD       diltiazem  90 mg Oral Q12H BOBBI SAUL Valadez      insulin lispro  1-6 Units Subcutaneous TID AC Osiris Singh, DO      labetalol  10 mg Intravenous Q4H PRN Osiris Singh, DO      metoprolol tartrate  100 mg Oral Q12H BOBBI SAUL Galicia      sodium bicarbonate  325 mg Oral BID after meals Wai Preston MD          Today, Patient Was Seen By: Amanda Briseno MD    **Please Note: This note may have been constructed using a voice recognition system.**

## 2024-05-07 NOTE — PLAN OF CARE
Problem: PAIN - ADULT  Goal: Verbalizes/displays adequate comfort level or baseline comfort level  Description: Interventions:  - Encourage patient to monitor pain and request assistance  - Assess pain using appropriate pain scale  - Administer analgesics based on type and severity of pain and evaluate response  - Implement non-pharmacological measures as appropriate and evaluate response  - Consider cultural and social influences on pain and pain management  - Notify physician/advanced practitioner if interventions unsuccessful or patient reports new pain  Outcome: Progressing     Problem: INFECTION - ADULT  Goal: Absence or prevention of progression during hospitalization  Description: INTERVENTIONS:  - Assess and monitor for signs and symptoms of infection  - Monitor lab/diagnostic results  - Monitor all insertion sites, i.e. indwelling lines, tubes, and drains  - Monitor endotracheal if appropriate and nasal secretions for changes in amount and color  - [place] appropriate cooling/warming therapies per order  - Administer medications as ordered  - Instruct and encourage patient and family to use good hand hygiene technique  - Identify and instruct in appropriate isolation precautions for identified infection/condition  Outcome: Progressing  Goal: Absence of fever/infection during neutropenic period  Description: INTERVENTIONS:  - Monitor WBC    Outcome: Progressing

## 2024-05-07 NOTE — ASSESSMENT & PLAN NOTE
No home medications    5/6 AM received nifedipine 60 mg once 1 dose and then this was changed to Cardizem 90 mg and received that morning of 5/6 and 5/7.  Metoprolol tartrate 100 mg every 12 hours    See above for antihypertensives

## 2024-05-07 NOTE — ASSESSMENT & PLAN NOTE
Rapid response was called for 10-second sinus pause post permacath placement.  Prior to rapid response, he had an episode of emesis.  S/p 1 mg atropine.  Suspect that increased vagal tone could have triggered the sinus pause.    Monitoring in ICU.  Cardiology on board

## 2024-05-07 NOTE — PROGRESS NOTES
General Cardiology   Progress Note -  [facility] One   Jerod Worthington 63 y.o. male MRN: [id number]    Unit/Bed#: S -01 Encounter: [id number]    Assessment  1. Newly diagnosed atrial flutter, unclear chronicity.  -ECG on admission demonstrated atrial flutter, RBBB (chronic) rate 140 bpm  -Repeat ECG 5/5; demonstrated atrial flutter with variable AVB, HR 88 bpm. RBBB (chronic)  -Telemetry review - atrial flutter, rate controlled  -Outpatient rate/rhythm control regimen; none.  -Inpatient rate/rhythm control; metoprolol tartrate 100 mg twice daily + Cardizem SR 90 mg q12h  -Started on apixaban 2.5 mg twice daily on 5/4, increased to 5 mg BID on 5/6.  2. Volume overload - likely stemming from severe CEE.  -BNP level 220  -Chest x-ray 5/3-severe pulmonary edema with small bilateral pleural effusions.  -TTE 5/4/2024; LVEF 60%, wall motion normal, RV normal size/systolic function, moderate MR.  -Outpatient diuretic regimen; none  -Inpatient diuretic regimen; IV Bumex GTT at 1 mg/hr  -24-hour I&O balance; -360 mL, overall -6.9 L  3. Nephrotic syndrome  4. CEE superimposed on CKD stage IV.  Nephrology following.  UACR 3139.6  Serum albumin 3.1  -Baseline creatinine around 1.89  -Creatinine level 7.25 on admission, currently 8.89, GFR 5  5. Hypertension  -Average /78 last recorded 121/67  -Outpatient BP regimen; none  -Inpatient BP regimen; metoprolol tartrate 100 mg twice daily + Cardizem SR 90 mg q12h  6. DM type II  -HgbA1c level 7.8  7. BOBBY - on CPAP at HS.     Plan  -Pt denies any specific cardiac complaints this a.m.  He notes improvement in his degree of shortness of breath and lower extremity edema.    He still remains volume overloaded on exam, predominately in his B/L LEs. No current supplemental O2 requirements, sats stable on RA.Minimal urinary output over the past 24 hrs w/current IV diuretic regimen. Renal function also continues to worsen. Per Nephro, HD to be considered.    -Remains in atrial flutter, rates  "are currently well controlled. Continue metoprolol tartrate 100 mg BID + Cardizem - adjust to  mg daily.   -Continue apixiban 5 mg twice kizzy  -Strict I's and O's, daily weights, 2 g Na+ diet 1.8 LFR.  -Monitor renal function and electrolytes closely.  Replete to maintain K+ level 4.0 magnesium level 2.0.  -Continue to monitor on telemetry.    Subjective  Review of Systems   Constitutional: Negative for chills, fever and malaise/fatigue.   Eyes:  Negative for visual disturbance.   Cardiovascular:  Positive for leg swelling. Negative for chest pain, dyspnea on exertion, orthopnea and palpitations.   Respiratory:  Negative for cough and shortness of breath.    Gastrointestinal:  Negative for abdominal pain.   Neurological:  Negative for dizziness, headaches and light-headedness.       Objective:   Physical Exam  Vitals and nursing note reviewed.   Constitutional:       General: He is not in acute distress.     Appearance: He is obese. He is not diaphoretic.   HENT:      Head: Normocephalic and atraumatic.   Eyes:      General: No scleral icterus.  Neurological:      Mental Status: He is alert.         Vitals: Blood pressure 121/67, pulse 66, temperature 97.8 °F (36.6 °C), resp. rate 16, height 5' 4\" (1.626 m), weight 101 kg (221 lb 9 oz), SpO2 97%.,     Body mass index is 38.03 kg/m².,   Systolic (24hrs), Av , Min:91 , Max:176     Diastolic (24hrs), Av, Min:57, Max:126      Intake/Output Summary (Last 24 hours) at 2024 0916  Last data filed at 2024 0601  Gross per 24 hour   Intake 390 ml   Output 750 ml   Net -360 ml     Weight (last 2 days)       Date/Time Weight    24 0600 101 (221.56)    24 0600 --     Weight: blood sugar  43 standing wgt not done until pt sugar is recovered at 24 0600    24 1100 103 (227.6)    24 0955 103 (227.6)            LABORATORY RESULTS      CBC with diff:   Results from last 7 days   Lab Units 24  0920 24  0233 24  2154 " "  WBC Thousand/uL 6.00  --  6.29   HEMOGLOBIN g/dL 10.4*  --  9.6*   HEMATOCRIT % 33.4*  --  30.9*   MCV fL 99*  --  99*   PLATELETS Thousands/uL 259 223 233   RBC Million/uL 3.37*  --  3.12*   MCH pg 30.9  --  30.8   MCHC g/dL 31.1*  --  31.1*   RDW % 14.2  --  14.5   MPV fL 9.2 9.1 9.6   NRBC AUTO /100 WBCs  --   --  0       CMP:  Results from last 7 days   Lab Units 05/07/24 0453 05/06/24 1819 05/06/24 0556 05/05/24 0920 05/04/24 0521 05/03/24 2154 05/03/24  1247   POTASSIUM mmol/L 5.3 5.5* 4.7 5.4* 4.7 5.0 5.5*   CHLORIDE mmol/L 105 106 107 109* 113* 111* 113*   CO2 mmol/L 22 20* 23 20* 20* 20* 20*   BUN mg/dL 74* 71* 66* 61* 58* 59* 59*   CREATININE mg/dL 8.89* 8.25* 7.91* 7.64* 6.97* 7.05* 7.25*   CALCIUM mg/dL 8.1* 8.5 8.5 8.9 8.1* 8.2* 8.4*   AST U/L  --   --   --   --  15 20 15   ALT U/L  --   --   --   --  18 20 16   ALK PHOS U/L  --   --   --   --  138* 147* 136*   EGFR ml/min/1.73sq m 5 6 6 6 7 7 8*       BMP:  Results from last 7 days   Lab Units 05/07/24 0453 05/06/24 1819 05/06/24 0556 05/05/24 0920 05/04/24 0521 05/03/24 2154 05/03/24  1247   POTASSIUM mmol/L 5.3 5.5* 4.7 5.4* 4.7 5.0 5.5*   CHLORIDE mmol/L 105 106 107 109* 113* 111* 113*   CO2 mmol/L 22 20* 23 20* 20* 20* 20*   BUN mg/dL 74* 71* 66* 61* 58* 59* 59*   CREATININE mg/dL 8.89* 8.25* 7.91* 7.64* 6.97* 7.05* 7.25*   CALCIUM mg/dL 8.1* 8.5 8.5 8.9 8.1* 8.2* 8.4*       No results found for: \"NTBNP\"     Results from last 7 days   Lab Units 05/07/24  0453 05/06/24  1819 05/06/24  0556   MAGNESIUM mg/dL 2.4 2.7 1.6*       Results from last 7 days   Lab Units 05/03/24  1247   HEMOGLOBIN A1C % 7.8*                   Lipid Profile:   No results found for: \"CHOL\"  No results found for: \"HDL\"  No results found for: \"LDLCALC\"  No results found for: \"TRIG\"    Cardiac testing:   No results found for this or any previous visit.    No results found for this or any previous visit.    No results found for this or any previous visit.    No " valid procedures specified.  No results found for this or any previous visit.      Meds/Allergies   all current active meds have been reviewed and current meds:   Current Facility-Administered Medications   Medication Dose Route Frequency    acetaminophen (TYLENOL) tablet 650 mg  650 mg Oral Q6H PRN    apixaban (ELIQUIS) tablet 5 mg  5 mg Oral BID    bumetanide (BUMEX) 12.5 mg infusion 50 mL  1 mg/hr Intravenous Continuous    cephalexin (KEFLEX) capsule 250 mg  250 mg Oral Q12H BOBBI    diltiazem (CARDIZEM SR) 12 hr capsule 90 mg  90 mg Oral Q12H BOBBI    insulin lispro (HumALOG/ADMELOG) 100 units/mL subcutaneous injection 1-6 Units  1-6 Units Subcutaneous TID AC    labetalol (NORMODYNE) injection 10 mg  10 mg Intravenous Q4H PRN    metoprolol tartrate (LOPRESSOR) tablet 100 mg  100 mg Oral Q12H BOBBI    sodium bicarbonate tablet 325 mg  325 mg Oral BID after meals     bumetanide (BUMEX) 12.5 mg infusion 50 mL, 1 mg/hr, Last Rate: 1 mg/hr (05/07/24 5904)        Counseling / Coordination of Care  Total floor / unit time spent today 20 minutes.  Greater than 50% of total time was spent with the patient and / or family counseling and / or coordination of care.      ** Please Note: Dragon 360 Dictation voice to text software may have been used in the creation of this document. **

## 2024-05-07 NOTE — ASSESSMENT & PLAN NOTE
Lab Results   Component Value Date    HGBA1C 7.8 (H) 05/03/2024       Recent Labs     05/06/24  1619 05/07/24  0714 05/07/24  1114 05/07/24  1618   POCGLU 156* 76 125 131       Blood Sugar Average: Last 72 hrs:  (P) [id number]    He had been receiving Lantus 20 units twice daily but became hypoglycemic with this.  He is now solely on SSI.  Continue SSI and Accu-Cheks

## 2024-05-07 NOTE — TREATMENT PLAN
Contacted by ICU team because patient with potassium of 7.3meq. he just got to ICU and Bumex gtt was initiated. S/p permacath placement earlier today. Pt post permacath placement with bradycardia and pauses. For now will do medical management while awaiting HD start. Contacted HD nurse will do 2 hr session today to help with hyperkalemia. Likely another treatment in am. Orders placed. Consent in chart per dr hayward.   Selene Mcclain MD, FASN, 5/7/2024, 7:22 PM

## 2024-05-07 NOTE — RAPID RESPONSE
Rapid Response Note  Jerod Worthington 63 y.o. male MRN: [id number]  Unit/Bed#: ICU 09 Encounter: [id number]    Rapid Response Notification(s):   Response called date/time:  5/7/2024 4:20 PM  Response team arrival date/time:  5/7/2024 4:20 PM  Response end date/time:  5/7/2024 4:40 PM  Level of care:  [facility]  Rapid response location:  [facility] unit  Primary reason for rapid response call:  Acute change in heart rate    Rapid Response Intervention(s):   Airway:  None  Breathing:  None  Circulation:  None  Fluids administered:  None  Medications administered:  None       Assessment:   Transient long Pause/Bradycardia/Slow Afib, possibly vasovagal    Plan:   Patient dry heaving/nauseous shortly after event. Unsure nausea/dry heaving preceded or came after bradycardic/sinus pause event.   Check 12 lead EKG. Check QTc. Obtain labs to r/o electrolyte derangement  Transfer to ICU for closer monitoring w/ transcutaneous pads in place.   Notified cardiology which came to bedside. Initial suspicion per cardiology this is a vasovagal event s/p fentanyl for permacath placement. Per cardiology, Recommend to hold diltiazam but continue metoprolol for now w/ hold parameters given prior difficulty in HR control.   Attending present and managing rapid response.      Rapid Response Outcome:   Transfer:  Transfer to stepdown 1  Primary service notified of transfer: Yes    Code Status: Level 1 (Full Code)      Family notified of transfer: yes  Family member contacted: SLIM advised they will notify family.      Background/Situation:   Jerod Worthington is a 63 y.o. male who h/o CKD4, DM, obese, HTN, BOBBY, obesity admitted 5/4 for CEE on CKD w/ creatinine 7.05 and hypertension. He was also found to be in Atrial flutter and started on metoprolol and diltiazam as well as Eliquis. He was started on a bumex drip and due to poor response, a permacath was placed today with the plan to start HD.     A rapid response was called when it was noticed patient  "was having frequent pauses on telemetry shortly after arrival from getting permacath placed.  Patient was found to be symptomatic with nausea, dry heaving. BS stable. BP stable.         Review of Systems   Respiratory:  Negative for choking and shortness of breath.    Gastrointestinal:  Positive for nausea.   All other systems reviewed and are negative.      Objective:   Vitals:    05/07/24 1522 05/07/24 1538 05/07/24 1617 05/07/24 1700   BP: 123/77 111/76 116/75 113/58   Pulse: 96 99 68 74   Resp:    14   Temp:       TempSrc:       SpO2: 100% 91% 94% 95%   Weight:       Height:         Physical Exam  Vitals and nursing note reviewed.   Constitutional:       General: He is not in acute distress.     Appearance: He is overweight.   HENT:      Head: Normocephalic.   Cardiovascular:      Rate and Rhythm: Bradycardia present. Rhythm irregular.   Pulmonary:      Effort: Pulmonary effort is normal. No accessory muscle usage.   Chest:      Comments: New Right subclavian HD permacath   Abdominal:      General: Abdomen is protuberant.   Neurological:      Mental Status: He is easily aroused.      GCS: GCS eye subscore is 4. GCS verbal subscore is 5. GCS motor subscore is 6.         Portions of the record may have been created with voice recognition software.  Occasional wrong word or \"sound a like\" substitutions may have occurred due to the inherent limitations of voice recognition software.  Read the chart carefully and recognize, using context, where substitutions have occurred.    Puja Matias PA-C      "

## 2024-05-07 NOTE — ASSESSMENT & PLAN NOTE
New onset atrial flutter this admission, rates previously were controlled in the 60s.    Rapid response was called for 10-second sinus pause post permacath placement.  Prior to rapid response, he had an episode of emesis.  S/p 1 mg atropine.  Suspect that increased vagal tone could have triggered the sinus pause.      PLAN:  Continue Eliquis 5 mg twice daily  Per cardiology, continue metoprolol but hold Cardizem  Tigan for nausea, qtc prolonged  Telemetry  Monitoring in ICU  Keep transcutaneous pads on for now  2g calcium gluconate for low calcium

## 2024-05-07 NOTE — PROCEDURES
"Venous Access Line Insertion    Date/Time: 5/7/2024 10:18 AM    Performed by: Anjelica Alston RN  Authorized by: Zena Paris MD    Patient location:  Bedside  [place] protocol:     Procedure explained and questions answered to patient or proxy's satisfaction: yes    Pre-procedure details:     Hand hygiene: Hand hygiene performed prior to insertion      Sterile barrier technique: All elements of maximal sterile technique followed      Skin preparation:  ChloraPrep    Skin preparation agent: Skin preparation agent completely dried prior to procedure    Procedure details:     Complex Venous Access Line Type: US Guided Peripheral IV      Orientation:  Right and lower    Location:  Arm    Catheter size:  20 gauge (1.88\")    Sterile ultrasound techniques: Sterile gel and sterile probe covers were used      Number of attempts:  1    Successful placement: yes      Landmarks identified: yes    Anesthesia (see MAR for exact dosages):     Anesthesia method:  None  Post-procedure details:     Assessment:  Blood return through all ports    Patient tolerance of procedure:  Tolerated well, no immediate complications          "

## 2024-05-07 NOTE — ASSESSMENT & PLAN NOTE
New-onset atrial flutter  Rates controlled, 65    Plan:  Continue Eliquis 5mg BID  Continue Cardizem 90mg BID  Continue Metoprolol 100mg BID  Continue Tele  Cardiology consulted, appreciate recommendations

## 2024-05-07 NOTE — QUICK NOTE
Notified by nursing that patient was having sinus pauses and heart rates in the 30s upon returning from getting his HD catheter placed.  ICU team contacted and rapid response was called.  1 mg atropine was given.  Fortunately patient never lost consciousness and sustained a good blood pressure throughout the ordeal.  He will be transferred to the ICU for closer monitoring and potential pacemaker if needed.  Daughter Erika was called and notified of the event and transfer to the ICU

## 2024-05-07 NOTE — DISCHARGE INSTRUCTIONS
Perma-cath Placement   WHAT YOU NEED TO KNOW:   A perma-cath is a catheter placed through a vein into or near your right atrium. Your right atrium is the right upper chamber of your heart. A perma-cath is used for dialysis in an emergency or until a long-term device is ready to use. After your procedure, you will have some pain and swelling on your chest and neck. You may have some bruises on your chest and neck. You may also have 2 dressings, one on your chest and one on your neck.   DISCHARGE INSTRUCTIONS:   Call 911 for any of the following:   You feel lightheaded, short of breath, and have chest pain.    Your catheter comes out   Contact Interventional Radiology at [phone] ([place] PATIENTS: Contact Interventional Radiology at [phone]) ([facility] PATIENTS: Contact Interventional Radiology at [phone]) if:  Blood soaks through your bandage.   You have new swelling in your arm, neck, face, or chest on your right side.  Your catheter gets wet.    Your bruises or pain get worse.   You have a fever or chills.  Persistent nausea or vomiting.   Your incision is red, swollen, or draining pus.   You have questions or concerns about your condition or care.  Self-care:       Resume your normal diet.  Keep your dressings dry. Do not take a shower or swim. You may take a tub bath, but do not get your dressings wet. Water in your wound can cause bacteria to grow and cause an infection. If your dressing gets wet, dry it off and cover it with dry sterile gauze. Call your healthcare provider. Do not use soaps or ointments.  Do not change your dressings. Your healthcare provider or dialysis nurse will change your dressings. Your dressings should stay in place until your healthcare provider removes them. The dressing on your chest will stay as long as you have the catheter in place. The dressing prevents infection.    Do not remove the red and blue caps from the end of your catheter. The caps prevent air from getting  into your catheter.  Follow up with your healthcare provider as directed: Write down your questions so you remember to ask them during your visits.

## 2024-05-07 NOTE — ASSESSMENT & PLAN NOTE
Lab Results   Component Value Date    EGFR 5 05/07/2024    EGFR 6 05/06/2024    EGFR 6 05/06/2024    CREATININE 8.89 (H) 05/07/2024    CREATININE 8.25 (H) 05/06/2024    CREATININE 7.91 (H) 05/06/2024   Previously was CKD 3B with baseline creatinine 2.  He has been on Bumex drip per nephrology as well as oral sodium bicarb.  Given he had decreasing urine output's, nephrology opted to begin dialysis.  He is s/p permacath placement with IR    Follow-up phospholipase A2 receptor antibody

## 2024-05-07 NOTE — CONSULTS
[facility]  Consult  Name: Jerod Worthington 63 y.o. male I MRN: [id number]  Unit/Bed#: ICU 09 I Date of Admission: 5/3/2024   Date of Service: 5/7/2024 I Hospital Day: 3    Assessment/Plan   Acute renal failure superimposed on stage 3 chronic kidney disease (HCC)  Assessment & Plan  Lab Results   Component Value Date    EGFR 5 05/07/2024    EGFR 6 05/06/2024    EGFR 6 05/06/2024    CREATININE 8.89 (H) 05/07/2024    CREATININE 8.25 (H) 05/06/2024    CREATININE 7.91 (H) 05/06/2024   Previously was CKD 3B with baseline creatinine 2.  He has been on Bumex drip per nephrology as well as oral sodium bicarb.  Given he had decreasing urine output's, nephrology opted to begin dialysis.  He is s/p permacath placement with IR    Follow-up phospholipase A2 receptor antibody     * Atrial flutter ([facility])  Assessment & Plan  New onset atrial flutter this admission, rates previously were controlled in the 60s.    Rapid response was called for 10-second sinus pause post permacath placement.  Prior to rapid response, he had an episode of emesis.  S/p 1 mg atropine.  Suspect that increased vagal tone could have triggered the sinus pause.      PLAN:  Continue Eliquis 5 mg twice daily  Per cardiology, continue metoprolol but hold Cardizem  Tigan for nausea, qtc prolonged  Telemetry  Monitoring in ICU  Keep transcutaneous pads on for now  2g calcium gluconate for low calcium    Essential hypertension  Assessment & Plan  No home medications    5/6 AM received nifedipine 60 mg once 1 dose and then this was changed to Cardizem 90 mg and received that morning of 5/6 and 5/7.  Metoprolol tartrate 100 mg every 12 hours    See above for antihypertensives    Type 2 diabetes mellitus with hyperglycemia, with long-term current use of insulin ([facility])  Assessment & Plan  Lab Results   Component Value Date    HGBA1C 7.8 (H) 05/03/2024       Recent Labs     05/06/24  1619 05/07/24  0714 05/07/24  1114 05/07/24  1618   POCGLU 156* 76  125 131       Blood Sugar Average: Last 72 hrs:  (P) [id number]    He had been receiving Lantus 20 units twice daily but became hypoglycemic with this.  He is now solely on SSI.  Continue SSI and Accu-Cheks    Severe obstructive sleep apnea  Assessment & Plan  Required BiPAP in ED for respiratory distress, off of BiPAP.  Uses CPAP nightly here         History of Present Illness     HPI: Jerod Worthington is a 63 y.o. with PMH of CKD 4 who presents with shortness of breath. On admission, he was noted to have CEE on CKD with admission creatinine of 7.05 and hypertensive with /120.  There was concern for volume overload with bilateral lower extremity edema, chest x-ray showed bilateral pleural effusion as well as pulmonary vascular congestion.  He has been seen by nephrology and was on Bumex drip.  Additionally cardiology saw him as he was in A-fib flutter with RVR and started on metoprolol 50 mg every 12 hours.  For hypertension, he was on nifedipine 60 mg which was then changed to Cardizem today.  Additionally, Eliquis 2.5 mg was started on 5/4.      Today, he went for HD catheter placement.  He was having sinus pauses and heart rates in the 30s.  Rapid response was called at which point 1 mg atropine was given.  During this time, patient maintained consciousness and had appropriate blood pressures.  SLIM called and updated daughter.    Before the rapid response event, he describes an episode of emesis.  He denies any shortness of breath, chest pain, headache, dizziness.    [facility] #[id number]    History obtained from patient and chart review.  Review of Systems   Constitutional:  Negative for fatigue and fever.   HENT:  Negative for congestion and sinus pain.    Respiratory:  Negative for cough, shortness of breath and wheezing.    Cardiovascular:  Negative for chest pain and palpitations.   Gastrointestinal:  Positive for vomiting. Negative for abdominal pain.   Musculoskeletal:  Negative for arthralgias and  back pain.   Neurological:  Negative for dizziness, weakness, light-headedness and headaches.     Disposition: Critical care   Historical Information   No past medical history on file. Past Surgical History:  5/7/2024: IR TUNNELED DIALYSIS CATHETER PLACEMENT   No current outpatient medications No Known Allergies     History reviewed. No pertinent family history.     Objective                            Vitals I/O      Most Recent Min/Max in 24hrs   Temp 97.6 °F (36.4 °C) Temp  Min: 97.6 °F (36.4 °C)  Max: 98.1 °F (36.7 °C)   Pulse 74 Pulse  Min: 63  Max: 109   Resp 14 Resp  Min: 14  Max: 18   /58 BP  Min: 100/70  Max: 139/72   O2 Sat 95 % SpO2  Min: 91 %  Max: 100 %      Intake/Output Summary (Last 24 hours) at 5/7/2024 1735  Last data filed at 5/7/2024 0601  Gross per 24 hour   Intake --   Output 125 ml   Net -125 ml       Diet Eric/CHO Controlled; Consistent Carbohydrate Diet Level 2 (5 carb servings/75 grams CHO/meal)    Invasive Monitoring           Physical Exam   Physical Exam  Vitals and nursing note reviewed.   Eyes:      General: No scleral icterus.     Conjunctiva/sclera: Conjunctivae normal.   Skin:     General: Skin is warm.      Coloration: Skin is not pale.      Findings: No rash or wound.   HENT:      Head: Normocephalic.      Nose: No congestion.      Mouth/Throat:      Mouth: Mucous membranes are moist.   Neck:      Vascular: No JVD.   Cardiovascular:      Rate and Rhythm: Normal rate and regular rhythm.      Pulses: Normal pulses.      Heart sounds: Normal heart sounds.   Musculoskeletal:      Right lower leg: Edema present.      Left lower leg: Edema present.      Comments: Firm edema of bilateral lower extremity extending to the proximal thighs   Abdominal:      Palpations: Abdomen is soft.      Tenderness: There is no abdominal tenderness.   Constitutional:       General: He is not in acute distress.     Appearance: He is well-developed. He is not toxic-appearing.      Interventions: He is  not sedated, not intubated and not restrained.  Pulmonary:      Effort: No respiratory distress. He is not intubated.      Breath sounds: Normal breath sounds. No wheezing, rhonchi or rales.   Neurological:      Mental Status: He is calm.      Comments: Able to raise all 4 extremities against gravity  No dysarthria            Diagnostic Studies      EKG: Reviewed telemetry, approximately 10-second sinus pause.  Imaging: CT head without intracranial hemorrhage or large mass effect. I have personally reviewed pertinent reports.       Medications:  Scheduled PRN   apixaban, 5 mg, BID  [Transfer Hold] atropine, 0.5 mg, Once  calcium gluconate, 2 g, Once  cephalexin, 250 mg, Q12H BOBBI  chlorhexidine, 15 mL, Q12H BOBBI  insulin lispro, 1-6 Units, TID AC  metoprolol tartrate, 100 mg, Q12H BOBBI  sodium bicarbonate, 325 mg, BID after meals      acetaminophen, 650 mg, Q6H PRN  ceFAZolin, , Continuous PRN  fentaNYL, , PRN  lidocaine-epinephrine 1%-1:[id number] buffered, , PRN  ondansetron, , PRN  trimethobenzamide, 200 mg, Q6H PRN       Continuous    ceFAZolin,          Labs:    CBC    Recent Labs     05/07/24  1658   WBC 8.36   HGB 9.8*   HCT 30.2*        BMP    Recent Labs     05/06/24  1819 05/07/24  0453   SODIUM 136 137   K 5.5* 5.3    105   CO2 20* 22   AGAP 10 10   BUN 71* 74*   CREATININE 8.25* 8.89*   CALCIUM 8.5 8.1*       Coags    Recent Labs     05/07/24  1658   INR 1.35*        Additional Electrolytes  Recent Labs     05/06/24  1819 05/07/24  0453 05/07/24  1658   MG 2.7 2.4  --    CAIONIZED  --   --  1.03*          Blood Gas    No recent results  No recent results LFTs  No recent results    Infectious  No recent results  Glucose  Recent Labs     05/06/24  0556 05/06/24  1819 05/07/24  0453   GLUC 42* 136 92               Sohail Stern MD

## 2024-05-07 NOTE — TELEMEDICINE
e-Consult (IPC)  - Interventional Radiology  Jerod Worthington 63 y.o. male MRN: [id number]  Unit/Bed#: S -01 Encounter: [id number]          Interventional Radiology has been consulted to evaluate Jerod Worthington      Inpatient Consult to IR  Consult performed by: Neil Eller MD  Consult ordered by: Wai Preston MD        05/07/24    Assessment/Recommendation:   63-year-old man with acute renal failure with chronic kidney disease.  Plan for new start hemodialysis.  Plan for tunneled line placement this afternoon.    5-10 minutes, >50% of the total time devoted to medical consultative verbal/EMR discussion between providers. Written report will be generated in the EMR.     Thank you for allowing Interventional Radiology to participate in the care of Jerod Worthington. Please don't hesitate to call or TigerText us with any questions.     Neil Eller MD

## 2024-05-07 NOTE — ASSESSMENT & PLAN NOTE
Lab Results   Component Value Date    EGFR 5 05/07/2024    EGFR 6 05/06/2024    EGFR 6 05/06/2024    CREATININE 8.89 (H) 05/07/2024    CREATININE 8.25 (H) 05/06/2024    CREATININE 7.91 (H) 05/06/2024

## 2024-05-07 NOTE — PROGRESS NOTES
NEPHROLOGY PROGRESS NOTE   Jerod Worthington 63 y.o. male MRN: [id number]  Unit/Bed#: S -01 Encounter: [id number]  Reason for Consult: CEE      SUMMARY:    63 y.o. man with PMH of CKD G4 follow-up at [facility] but no labs in 2023, diabetic, obese, hypertension, BOBBY, obesity p/w SOB.  Nephrology is consulted for CEE        ASSESSMENT and PLAN:     Acute kidney injury  -- Present on admission with underlying chronic kidney disease stage IV with possible progression of disease  -- No labs in 2023 I suspect some degree of progression.  He has nephrotic range proteinuria.  Possibly in the setting of diabetic kidney disease along with component of cardiorenal syndrome  --Renal function continues to worsen creatinine up to 8.89 mg/dL with a BUN of 74.  Noted drop in the urine output with reduced diuretic dose.  Appears to have some symptoms of uremia  -- Electrolytes and acid-base status are improved  --Volume overloaded currently on a Bumex drip  --To start renal replacement therapy.  --Daughter at bedside was able to translate and explained to the patient.  All questions were answered.  Consent obtained and placed on the chart  --Obtain hepatitis panel.  Case management consult for outpatient HD placement  --Consulted interventional radiology for placement of a permacath.  Currently n.p.o. if not able to get the catheter today can restart meal  --Plan for HD start in the next 24 to 48 hours once he has a permacath in place     Chronic kidney disease stage IV (G4A3)  -- Presumed secondary to diabetic kidney disease and progression of underlying disease, obesity related renal dysfunction  -- Nephrotic range proteinuria  -- Follow-up phospholipase A2 receptor antibody  -- Baseline creatinine historically high 1's, in 2022, no labs in the last year     Low bicarbonate level--resolved  -- Bicarbonate level improving once on dialysis will discontinue bicarbonate      Volume overload  -- In the setting of acute kidney  injury/chronic kidney disease  -- Continue Bumex drip  --Plan to start dialysis     Diabetes mellitus type 2  -- Hemoglobin A1c 7.8     Hyperkalemia--resolved      SUBJECTIVE / INTERVAL HISTORY:    Having some nausea and vomiting.  Blood pressure is stable.    OBJECTIVE:  Current Weight: Weight - Scale: 101 kg (221 lb 9 oz)  Vitals:    05/07/24 0713 05/07/24 0853 05/07/24 0853 05/07/24 1113   BP: 114/81 121/67 121/67 130/79   Pulse:   66 63   Resp: 16   14   Temp: 97.8 °F (36.6 °C)   97.6 °F (36.4 °C)   TempSrc:       SpO2: 99%  97% 92%   Weight:       Height:           Intake/Output Summary (Last 24 hours) at 5/7/2024 1341  Last data filed at 5/7/2024 0601  Gross per 24 hour   Intake 240 ml   Output 125 ml   Net 115 ml       Review of Systems:    Constitutional: Negative for chills and fever.   HENT: Negative for ear pain and sore throat.    Eyes: Negative for pain and visual disturbance.   Respiratory: Negative for cough and shortness of breath.    Cardiovascular: Negative for chest pain and palpitations.   Gastrointestinal: Negative for abdominal pain and vomiting.   Genitourinary: Negative for dysuria and hematuria.   Musculoskeletal: Negative for arthralgias and back pain.   Skin: Negative for color change and rash.   Neurological: Negative for seizures and syncope.   12 point ROS has been reviewed.    Physical Exam  Vitals and nursing note reviewed. Exam conducted with a chaperone present.   Constitutional:       General: He is not in acute distress.     Appearance: He is well-developed. He is obese.   HENT:      Head: Normocephalic and atraumatic.   Eyes:      General: No scleral icterus.     Conjunctiva/sclera: Conjunctivae normal.      Pupils: Pupils are equal, round, and reactive to light.   Cardiovascular:      Rate and Rhythm: Normal rate and regular rhythm.      Heart sounds: S1 normal and S2 normal. No murmur heard.     No friction rub. No gallop.   Pulmonary:      Effort: Pulmonary effort is normal.  No respiratory distress.      Breath sounds: Normal breath sounds. No wheezing or rales.   Abdominal:      General: Bowel sounds are normal.      Palpations: Abdomen is soft.      Tenderness: There is no abdominal tenderness. There is no rebound.   Musculoskeletal:         General: Normal range of motion.      Cervical back: Normal range of motion and neck supple.      Right lower leg: Edema present.      Left lower leg: Edema present.   Skin:     Findings: No rash.   Neurological:      Mental Status: He is alert and oriented to person, place, and time.   Psychiatric:         Behavior: Behavior normal.         Medications:    Current Facility-Administered Medications:     acetaminophen (TYLENOL) tablet 650 mg, 650 mg, Oral, Q6H PRN, Osiris Singh DO, 650 mg at 05/05/24 0753    apixaban (ELIQUIS) tablet 5 mg, 5 mg, Oral, BID, SAUL Valadez, 5 mg at 05/07/24 0852    bumetanide (BUMEX) 12.5 mg infusion 50 mL, 1 mg/hr, Intravenous, Continuous, Wai Preston MD, Last Rate: 4 mL/hr at 05/07/24 0859, 1 mg/hr at 05/07/24 0859    cephalexin (KEFLEX) capsule 250 mg, 250 mg, Oral, Q12H BOBBI, Boy Guan MD, 250 mg at 05/07/24 0853    diltiazem (CARDIZEM SR) 12 hr capsule 90 mg, 90 mg, Oral, Q12H BOBBI, SAUL Valadez, 90 mg at 05/07/24 0855    insulin lispro (HumALOG/ADMELOG) 100 units/mL subcutaneous injection 1-6 Units, 1-6 Units, Subcutaneous, TID AC, 1 Units at 05/06/24 1711 **AND** Fingerstick Glucose (POCT), , , TID AC, Osiris Singh DO    labetalol (NORMODYNE) injection 10 mg, 10 mg, Intravenous, Q4H PRN, Osiris Singh DO, 10 mg at 05/04/24 2233    metoprolol tartrate (LOPRESSOR) tablet 100 mg, 100 mg, Oral, Q12H BOBBI, SAUL Gal[place], 100 mg at 05/07/24 0853    sodium bicarbonate tablet 325 mg, 325 mg, Oral, BID after meals, Wai Preston MD, 325 mg at 05/07/24 0862    Laboratory Results:  Results from last 7 days   Lab Units 05/07/24  0453 05/06/24  2618  05/06/24  0556 05/05/24  0920 05/04/24  0521 05/04/24  0233 05/03/24  2154 05/03/24  1247   WBC Thousand/uL  --   --   --  6.00  --   --  6.29  --    HEMOGLOBIN g/dL  --   --   --  10.4*  --   --  9.6*  --    HEMATOCRIT %  --   --   --  33.4*  --   --  30.9*  --    PLATELETS Thousands/uL  --   --   --  259  --  223 233  --    POTASSIUM mmol/L 5.3 5.5* 4.7 5.4* 4.7  --  5.0 5.5*   CHLORIDE mmol/L 105 106 107 109* 113*  --  111* 113*   CO2 mmol/L 22 20* 23 20* 20*  --  20* 20*   BUN mg/dL 74* 71* 66* 61* 58*  --  59* 59*   CREATININE mg/dL 8.89* 8.25* 7.91* 7.64* 6.97*  --  7.05* 7.25*   CALCIUM mg/dL 8.1* 8.5 8.5 8.9 8.1*  --  8.2* 8.4*   MAGNESIUM mg/dL 2.4 2.7 1.6*  --   --   --   --   --    PHOSPHORUS mg/dL  --   --   --  4.9*  --   --   --   --        PLEASE NOTE:  This encounter was completed utilizing the [employer] Direct Speech Voice Recognition Software. Grammatical errors, random word insertions, pronoun errors and incomplete sentences are occasional consequences of the system due to software limitations, ambient noise and hardware issues.These may be missed by proof reading prior to affixing electronic signature. Any questions or concerns about the content, text or information contained within the body of this dictation should be directly addressed to the physician for clarification. Please do not hesitate to call me directly if you have any any questions or concerns.

## 2024-05-08 ENCOUNTER — APPOINTMENT (INPATIENT)
Dept: DIALYSIS | Facility: HOSPITAL | Age: 64
DRG: 469 | End: 2024-05-08
Payer: MEDICARE

## 2024-05-08 PROBLEM — I45.5 SINUS PAUSE: Status: ACTIVE | Noted: 2024-05-08

## 2024-05-08 LAB
ANION GAP SERPL CALCULATED.3IONS-SCNC: 10 MMOL/L (ref 4–13)
ATRIAL RATE: 74 BPM
BASOPHILS # BLD AUTO: 0.03 THOUSANDS/ÂΜL (ref 0–0.1)
BASOPHILS NFR BLD AUTO: 0 % (ref 0–1)
BUN SERPL-MCNC: 54 MG/DL (ref 5–25)
CA-I BLD-SCNC: 1.07 MMOL/L (ref 1.12–1.32)
CALCIUM SERPL-MCNC: 8.4 MG/DL (ref 8.4–10.2)
CHLORIDE SERPL-SCNC: 101 MMOL/L (ref 96–108)
CO2 SERPL-SCNC: 25 MMOL/L (ref 21–32)
CREAT SERPL-MCNC: 7.49 MG/DL (ref 0.6–1.3)
EOSINOPHIL # BLD AUTO: 0.1 THOUSAND/ÂΜL (ref 0–0.61)
EOSINOPHIL NFR BLD AUTO: 1 % (ref 0–6)
ERYTHROCYTE [DISTWIDTH] IN BLOOD BY AUTOMATED COUNT: 14.1 % (ref 11.6–15.1)
GFR SERPL CREATININE-BSD FRML MDRD: 7 ML/MIN/1.73SQ M
GLUCOSE SERPL-MCNC: 132 MG/DL (ref 65–140)
GLUCOSE SERPL-MCNC: 171 MG/DL (ref 65–140)
GLUCOSE SERPL-MCNC: 96 MG/DL (ref 65–140)
GLUCOSE SERPL-MCNC: 98 MG/DL (ref 65–140)
HBV CORE AB SER QL: NORMAL
HBV CORE IGM SER QL: NORMAL
HBV SURFACE AB SER-ACNC: <3 MIU/ML
HBV SURFACE AG SER QL: NORMAL
HCT VFR BLD AUTO: 27.4 % (ref 36.5–49.3)
HCV AB SER QL: NORMAL
HGB BLD-MCNC: 9 G/DL (ref 12–17)
IMM GRANULOCYTES # BLD AUTO: 0.03 THOUSAND/UL (ref 0–0.2)
IMM GRANULOCYTES NFR BLD AUTO: 0 % (ref 0–2)
LYMPHOCYTES # BLD AUTO: 1.55 THOUSANDS/ÂΜL (ref 0.6–4.47)
LYMPHOCYTES NFR BLD AUTO: 19 % (ref 14–44)
MAGNESIUM SERPL-MCNC: 2.1 MG/DL (ref 1.9–2.7)
MCH RBC QN AUTO: 31.7 PG (ref 26.8–34.3)
MCHC RBC AUTO-ENTMCNC: 32.8 G/DL (ref 31.4–37.4)
MCV RBC AUTO: 97 FL (ref 82–98)
MONOCYTES # BLD AUTO: 0.69 THOUSAND/ÂΜL (ref 0.17–1.22)
MONOCYTES NFR BLD AUTO: 9 % (ref 4–12)
MRSA NOSE QL CULT: NORMAL
NEUTROPHILS # BLD AUTO: 5.7 THOUSANDS/ÂΜL (ref 1.85–7.62)
NEUTS SEG NFR BLD AUTO: 71 % (ref 43–75)
NRBC BLD AUTO-RTO: 0 /100 WBCS
P AXIS: 108 DEGREES
PHOSPHATE SERPL-MCNC: 5.8 MG/DL (ref 2.3–4.1)
PLA2R IGG SER IA-ACNC: 2.1 RU/ML (ref 0–19.9)
PLATELET # BLD AUTO: 245 THOUSANDS/UL (ref 149–390)
PMV BLD AUTO: 9.2 FL (ref 8.9–12.7)
POTASSIUM SERPL-SCNC: 4.4 MMOL/L (ref 3.5–5.3)
PR INTERVAL: 216 MS
QRS AXIS: -75 DEGREES
QRSD INTERVAL: 138 MS
QT INTERVAL: 456 MS
QTC INTERVAL: 506 MS
RBC # BLD AUTO: 2.84 MILLION/UL (ref 3.88–5.62)
SODIUM SERPL-SCNC: 136 MMOL/L (ref 135–147)
T WAVE AXIS: -36 DEGREES
VENTRICULAR RATE: 74 BPM
WBC # BLD AUTO: 8.1 THOUSAND/UL (ref 4.31–10.16)

## 2024-05-08 PROCEDURE — 99232 SBSQ HOSP IP/OBS MODERATE 35: CPT | Performed by: INTERNAL MEDICINE

## 2024-05-08 PROCEDURE — 94660 CPAP INITIATION&MGMT: CPT

## 2024-05-08 PROCEDURE — 85025 COMPLETE CBC W/AUTO DIFF WBC: CPT

## 2024-05-08 PROCEDURE — 99233 SBSQ HOSP IP/OBS HIGH 50: CPT | Performed by: INTERNAL MEDICINE

## 2024-05-08 PROCEDURE — 80048 BASIC METABOLIC PNL TOTAL CA: CPT

## 2024-05-08 PROCEDURE — 83735 ASSAY OF MAGNESIUM: CPT

## 2024-05-08 PROCEDURE — 5A1D70Z PERFORMANCE OF URINARY FILTRATION, INTERMITTENT, LESS THAN 6 HOURS PER DAY: ICD-10-PCS | Performed by: INTERNAL MEDICINE

## 2024-05-08 PROCEDURE — 84100 ASSAY OF PHOSPHORUS: CPT

## 2024-05-08 PROCEDURE — 93010 ELECTROCARDIOGRAM REPORT: CPT | Performed by: INTERNAL MEDICINE

## 2024-05-08 PROCEDURE — 82948 REAGENT STRIP/BLOOD GLUCOSE: CPT

## 2024-05-08 PROCEDURE — 94760 N-INVAS EAR/PLS OXIMETRY 1: CPT

## 2024-05-08 PROCEDURE — NC001 PR NO CHARGE: Performed by: INTERNAL MEDICINE

## 2024-05-08 PROCEDURE — 82330 ASSAY OF CALCIUM: CPT

## 2024-05-08 RX ORDER — CALCIUM GLUCONATE 20 MG/ML
2 INJECTION, SOLUTION INTRAVENOUS ONCE
Status: COMPLETED | OUTPATIENT
Start: 2024-05-08 | End: 2024-05-08

## 2024-05-08 RX ORDER — CEPHALEXIN 250 MG/1
250 CAPSULE ORAL EVERY 12 HOURS SCHEDULED
Status: COMPLETED | OUTPATIENT
Start: 2024-05-08 | End: 2024-05-08

## 2024-05-08 RX ADMIN — APIXABAN 5 MG: 5 TABLET, FILM COATED ORAL at 18:06

## 2024-05-08 RX ADMIN — CEPHALEXIN 250 MG: 250 CAPSULE ORAL at 08:07

## 2024-05-08 RX ADMIN — APIXABAN 5 MG: 5 TABLET, FILM COATED ORAL at 08:06

## 2024-05-08 RX ADMIN — METOPROLOL TARTRATE 100 MG: 100 TABLET, FILM COATED ORAL at 08:07

## 2024-05-08 RX ADMIN — CHLORHEXIDINE GLUCONATE 15 ML: 1.2 RINSE ORAL at 08:06

## 2024-05-08 RX ADMIN — SODIUM BICARBONATE 650 MG TABLET 325 MG: at 08:07

## 2024-05-08 RX ADMIN — METOPROLOL TARTRATE 100 MG: 100 TABLET, FILM COATED ORAL at 22:05

## 2024-05-08 RX ADMIN — CALCIUM GLUCONATE 2 G: 20 INJECTION, SOLUTION INTRAVENOUS at 13:14

## 2024-05-08 RX ADMIN — CHLORHEXIDINE GLUCONATE 15 ML: 1.2 RINSE ORAL at 22:07

## 2024-05-08 RX ADMIN — CEPHALEXIN 250 MG: 250 CAPSULE ORAL at 22:05

## 2024-05-08 NOTE — ASSESSMENT & PLAN NOTE
New onset atrial flutter this admission, rates previously were controlled in the 60s.    Rapid response was called for 10-second sinus pause post permacath placement.  Prior to rapid response, he had an episode of emesis.  S/p 1 mg atropine.  Suspect that increased vagal tone could have triggered the sinus pause.    -Rate has been consistently HR low 100s on metoprolol 100mg BID, cardiology stated it is appropriate to keep him at this rate as is likely normal physiological response    PLAN:  Continue Eliquis 5 mg twice daily  Per cardiology, continue metoprolol but hold Cardizem  Tigan for nausea, qtc prolonged  Telemetry  Monitoring in ICU  Keep transcutaneous pads on for now

## 2024-05-08 NOTE — PLAN OF CARE
Problem: PAIN - ADULT  Goal: Verbalizes/displays adequate comfort level or baseline comfort level  Description: Interventions:  - Encourage patient to monitor pain and request assistance  - Assess pain using appropriate pain scale  - Administer analgesics based on type and severity of pain and evaluate response  - Implement non-pharmacological measures as appropriate and evaluate response  - Consider cultural and social influences on pain and pain management  - Notify physician/advanced practitioner if interventions unsuccessful or patient reports new pain  Outcome: Progressing     Problem: INFECTION - ADULT  Goal: Absence or prevention of progression during hospitalization  Description: INTERVENTIONS:  - Assess and monitor for signs and symptoms of infection  - Monitor lab/diagnostic results  - Monitor all insertion sites, i.e. indwelling lines, tubes, and drains  - Monitor endotracheal if appropriate and nasal secretions for changes in amount and color  - [place] appropriate cooling/warming therapies per order  - Administer medications as ordered  - Instruct and encourage patient and family to use good hand hygiene technique  - Identify and instruct in appropriate isolation precautions for identified infection/condition  Outcome: Progressing  Goal: Absence of fever/infection during neutropenic period  Description: INTERVENTIONS:  - Monitor WBC    Outcome: Progressing     Problem: SAFETY ADULT  Goal: Patient will remain free of falls  Description: INTERVENTIONS:  - Educate patient/family on patient safety including physical limitations  - Instruct patient to call for assistance with activity   - Consult OT/PT to assist with strengthening/mobility   - Keep Call bell within reach  - Keep bed low and locked with side rails adjusted as appropriate  - Keep care items and personal belongings within reach  - Initiate and maintain comfort rounds  - Make Fall Risk Sign visible to staff  - Offer Toileting every  Hours,  in advance of need  - Initiate/Maintain alarm  - Obtain necessary fall risk management equipment:   - Apply yellow socks and bracelet for high fall risk patients  - Consider moving patient to room near nurses station  Outcome: Progressing  Goal: Maintain or return to baseline ADL function  Description: INTERVENTIONS:  -  Assess patient's ability to carry out ADLs; assess patient's baseline for ADL function and identify physical deficits which impact ability to perform ADLs (bathing, care of mouth/teeth, toileting, grooming, dressing, etc.)  - Assess/evaluate cause of self-care deficits   - Assess range of motion  - Assess patient's mobility; develop plan if impaired  - Assess patient's need for assistive devices and provide as appropriate  - Encourage maximum independence but intervene and supervise when necessary  - Involve family in performance of ADLs  - Assess for home care needs following discharge   - Consider OT consult to assist with ADL evaluation and planning for discharge  - Provide patient education as appropriate  Outcome: Progressing  Goal: Maintains/Returns to pre admission functional level  Description: INTERVENTIONS:  - Perform AM-PAC 6 Click Basic Mobility/ Daily Activity assessment daily.  - Set and communicate daily mobility goal to care team and patient/family/caregiver.   - Collaborate with rehabilitation services on mobility goals if consulted  - Perform Range of Motion  times a day.  - Reposition patient every  hours.  - Dangle patient  times a day  - Stand patient  times a day  - Ambulate patient  times a day  - Out of bed to chair   Problem: METABOLIC, FLUID AND ELECTROLYTES - ADULT  Goal: Electrolytes maintained within normal limits  Description: INTERVENTIONS:  - Monitor labs and assess patient for signs and symptoms of electrolyte imbalances  - Administer electrolyte replacement as ordered  - Monitor response to electrolyte replacements, including repeat lab results as appropriate  -  Instruct patient on fluid and nutrition as appropriate  Outcome: Progressing  Goal: Fluid balance maintained  Description: INTERVENTIONS:  - Monitor labs   - Monitor I/O and WT  - Instruct patient on fluid and nutrition as appropriate  - Assess for signs & symptoms of volume excess or deficit  Outcome: Progressing     Problem: SAFETY,RESTRAINT: NV/NON-SELF DESTRUCTIVE BEHAVIOR  Goal: Remains free of harm/injury (restraint for non violent/non self-detsructive behavior)  Description: INTERVENTIONS:  - Instruct patient/family regarding restraint use   - Assess and monitor physiologic and psychological status   - Provide interventions and comfort measures to meet assessed patient needs   - Identify and implement measures to help patient regain control  - Assess readiness for release of restraint   Outcome: Progressing  Goal: Returns to optimal restraint-free functioning  Description: INTERVENTIONS:  - Assess the patient's behavior and symptoms that indicate continued need for restraint  - Identify and implement measures to help patient regain control  - Assess readiness for release of restraint   Outcome: Progressing    times a day   - Out of bed for meals  times a day  - Out of bed for toileting  - Record patient progress and toleration of activity level   Outcome: Progressing

## 2024-05-08 NOTE — PLAN OF CARE
Problem: PAIN - ADULT  Goal: Verbalizes/displays adequate comfort level or baseline comfort level  Description: Interventions:  - Encourage patient to monitor pain and request assistance  - Assess pain using appropriate pain scale  - Administer analgesics based on type and severity of pain and evaluate response  - Implement non-pharmacological measures as appropriate and evaluate response  - Consider cultural and social influences on pain and pain management  - Notify physician/advanced practitioner if interventions unsuccessful or patient reports new pain  Outcome: Progressing     Problem: INFECTION - ADULT  Goal: Absence or prevention of progression during hospitalization  Description: INTERVENTIONS:  - Assess and monitor for signs and symptoms of infection  - Monitor lab/diagnostic results  - Monitor all insertion sites, i.e. indwelling lines, tubes, and drains  - Monitor endotracheal if appropriate and nasal secretions for changes in amount and color  - [place] appropriate cooling/warming therapies per order  - Administer medications as ordered  - Instruct and encourage patient and family to use good hand hygiene technique  - Identify and instruct in appropriate isolation precautions for identified infection/condition  Outcome: Progressing  Goal: Absence of fever/infection during neutropenic period  Description: INTERVENTIONS:  - Monitor WBC    Outcome: Progressing     Problem: SAFETY ADULT  Goal: Patient will remain free of falls  Description: INTERVENTIONS:  - Educate patient/family on patient safety including physical limitations  - Instruct patient to call for assistance with activity   - Consult OT/PT to assist with strengthening/mobility   - Keep Call bell within reach  - Keep bed low and locked with side rails adjusted as appropriate  - Keep care items and personal belongings within reach  - Initiate and maintain comfort rounds  - Make Fall Risk Sign visible to staff  - Offer Toileting every 2 Hours,  in advance of need  - Initiate/Maintain bed alarm  - Obtain necessary fall risk management equipment:   - Apply yellow socks and bracelet for high fall risk patients  - Consider moving patient to room near nurses station  Outcome: Progressing  Goal: Maintain or return to baseline ADL function  Description: INTERVENTIONS:  -  Assess patient's ability to carry out ADLs; assess patient's baseline for ADL function and identify physical deficits which impact ability to perform ADLs (bathing, care of mouth/teeth, toileting, grooming, dressing, etc.)  - Assess/evaluate cause of self-care deficits   - Assess range of motion  - Assess patient's mobility; develop plan if impaired  - Assess patient's need for assistive devices and provide as appropriate  - Encourage maximum independence but intervene and supervise when necessary  - Involve family in performance of ADLs  - Assess for home care needs following discharge   - Consider OT consult to assist with ADL evaluation and planning for discharge  - Provide patient education as appropriate  Outcome: Progressing  Goal: Maintains/Returns to pre admission functional level  Description: INTERVENTIONS:  - Perform AM-PAC 6 Click Basic Mobility/ Daily Activity assessment daily.  - Set and communicate daily mobility goal to care team and patient/family/caregiver.   - Collaborate with rehabilitation services on mobility goals if consulted  - Perform Range of Motion 8 times a day.  - Reposition patient every 2 hours.  - Dangle patient 3 times a day  - Stand patient 3 times a day  - Ambulate patient 3 times a day  - Out of bed to chair 3 times a day   - Out of bed for meals 3 times a day  - Out of bed for toileting  - Record patient progress and toleration of activity level   Outcome: Progressing     Problem: DISCHARGE PLANNING  Goal: Discharge to home or other facility with appropriate resources  Description: INTERVENTIONS:  - Identify barriers to discharge w/patient and caregiver  -  Arrange for needed discharge resources and transportation as appropriate  - Identify discharge learning needs (meds, wound care, etc.)  - Arrange for interpretive services to assist at discharge as needed  - Refer to Case Management Department for coordinating discharge planning if the patient needs post-hospital services based on physician/advanced practitioner order or complex needs related to functional status, cognitive ability, or social support system  Outcome: Progressing     Problem: Knowledge Deficit  Goal: Patient/family/caregiver demonstrates understanding of disease process, treatment plan, medications, and discharge instructions  Description: Complete learning assessment and assess knowledge base.  Interventions:  - Provide teaching at level of understanding  - Provide teaching via preferred learning methods  Outcome: Progressing

## 2024-05-08 NOTE — NURSING NOTE
0330: Call coming from a woman named Kathi asking for patient information. This person is not in patients chart. Explained that since she is not listed as a  that we are unable to give medical information to her at this time. Patient is having some increasing confusion and forgetfulness at this time and not sure if she can be added to the chart due to patient's confusion.    0400: Patient attempting to get up multiple times OOB without assistance. Patient only AAOx2 at this time and is having periods of confusion and forgetfulness. Unclear about adding patient to charty at this time and was explained to her. this time.  used to educate patient on importance of calling for assistance when wanting to get OOB. Patient still attempting to get OOB despite reinforcement and education. Safety lap placed on patient explanation provided to patient that is it is for his safety. CC aware that patient having confusion at this time.

## 2024-05-08 NOTE — PROGRESS NOTES
NEPHROLOGY PROGRESS NOTE   Jerod Worthington 63 y.o. male MRN: [id number]  Unit/Bed#: ICU 09 Encounter: [id number]  Reason for Consult: ARF      SUMMARY:    63 y.o. man with PMH of CKD G4 follow-up at [facility] but no labs in 2023, diabetic, obese, hypertension, BOBBY, obesity p/w SOB.  Nephrology is consulted for CEE        ASSESSMENT and PLAN:     Acute renal failure currently dialysis dependent  -- Present on admission with underlying chronic kidney disease stage IV with possible progression of disease  -- No labs in 2023 I suspect some degree of progression.  He has nephrotic range proteinuria.  Possibly in the setting of diabetic kidney disease along with component of cardiorenal syndrome  --Patient started on renal replacement therapy due to worsening renal function and early symptoms of uremia.  Had a rapid response last night due to hyperkalemia.  --Labs are stable today  --Off Bumex drip  --To start renal replacement therapy.  --HD started on May 7 urgently due to hyperkalemia/rapid response  --Hepatitis panel has been ordered, outpatient HD placement by case management  --Access: Right IJ permacath placed on May 7th  -- Plan for second dialysis treatment today     Chronic kidney disease stage IV (G4A3)  -- Presumed secondary to diabetic kidney disease and progression of underlying disease, obesity related renal dysfunction  -- Nephrotic range proteinuria  -- Follow-up phospholipase A2 receptor antibody  -- Baseline creatinine historically high 1's, in 2022, no labs in the last year  --May potentially be lifelong dialysis given that he had advanced CKD to begin with     Low bicarbonate level--resolved  -- Discontinue oral sodium bicarbonate     Volume overload  -- Off Bumex  --Ultrafiltration with dialysis  --Will plan to start oral diuretics on non-HD days     Diabetes mellitus type 2  -- Hemoglobin A1c 7.8     Hyperkalemia--resolved      SUBJECTIVE / INTERVAL HISTORY:    Rapid response last night due to  bradycardia found to have a potassium of 7.3.  Started urgently on dialysis last night    OBJECTIVE:  Current Weight: Weight - Scale: 100 kg (220 lb 7.4 oz)  Vitals:    05/08/24 0930 05/08/24 0945 05/08/24 1000 05/08/24 1015   BP: 112/70 112/68 110/66 103/57   BP Location:       Pulse: 102 103 99 90   Resp: 15 16 15 (!) 11   Temp:       TempSrc:       SpO2: 99% 100% 95% 96%   Weight:       Height:           Intake/Output Summary (Last 24 hours) at 5/8/2024 1034  Last data filed at 5/8/2024 1015  Gross per 24 hour   Intake 1437.99 ml   Output 2625 ml   Net -1187.01 ml       Review of Systems:    Constitutional: Negative for chills and fever.   HENT: Negative for ear pain and sore throat.    Eyes: Negative for pain and visual disturbance.   Respiratory: Negative for cough and shortness of breath.    Cardiovascular: Negative for chest pain and palpitations.   Gastrointestinal: Negative for abdominal pain and vomiting.   Genitourinary: Negative for dysuria and hematuria.   Musculoskeletal: Negative for arthralgias and back pain.   Skin: Negative for color change and rash.   Neurological: Negative for seizures and syncope.   12 point ROS has been reviewed.    Physical Exam  Vitals and nursing note reviewed.   Constitutional:       General: He is not in acute distress.     Appearance: He is well-developed. He is obese.   HENT:      Head: Normocephalic and atraumatic.   Eyes:      General: No scleral icterus.     Conjunctiva/sclera: Conjunctivae normal.      Pupils: Pupils are equal, round, and reactive to light.   Cardiovascular:      Rate and Rhythm: Normal rate and regular rhythm.      Heart sounds: S1 normal and S2 normal. No murmur heard.     No friction rub. No gallop.   Pulmonary:      Effort: Pulmonary effort is normal. No respiratory distress.      Breath sounds: Normal breath sounds. No wheezing or rales.   Abdominal:      General: Bowel sounds are normal.      Palpations: Abdomen is soft.      Tenderness: There  is no abdominal tenderness. There is no rebound.   Musculoskeletal:         General: Normal range of motion.      Cervical back: Normal range of motion and neck supple.      Right lower leg: Edema present.      Left lower leg: Edema present.   Skin:     Findings: No rash.   Neurological:      Mental Status: He is alert and oriented to person, place, and time.   Psychiatric:         Behavior: Behavior normal.         Medications:    Current Facility-Administered Medications:     acetaminophen (TYLENOL) tablet 650 mg, 650 mg, Oral, Q6H PRN, Ronna Hurtado, DO, 650 mg at 05/05/24 0753    apixaban (ELIQUIS) tablet 5 mg, 5 mg, Oral, BID, Ronna Hurtado, , 5 mg at 05/08/24 0806    calcium gluconate 2 g in sodium chloride 0.9% 100 mL (premix), 2 g, Intravenous, Once, Ronna Hurtado DO    cephalexin (KEFLEX) capsule 250 mg, 250 mg, Oral, Q12H BOBBI, Ronna Hurtado, DO    chlorhexidine (PERIDEX) 0.12 % oral rinse 15 mL, 15 mL, Mouth/Throat, Q12H BOBBI, Ronna Hurtado, DO, 15 mL at 05/08/24 0806    insulin lispro (HumALOG/ADMELOG) 100 units/mL subcutaneous injection 1-6 Units, 1-6 Units, Subcutaneous, TID AC, 1 Units at 05/06/24 1711 **AND** Fingerstick Glucose (POCT), , , TID AC, Ronna Hurtado, DO    metoprolol tartrate (LOPRESSOR) tablet 100 mg, 100 mg, Oral, Q12H BOBBI, Sohail Stern MD, 100 mg at 05/08/24 0807    trimethobenzamide (TIGAN) IM injection 200 mg, 200 mg, Intramuscular, Q6H PRN, Puja Matias PA-C    Laboratory Results:  Results from last 7 days   Lab Units 05/08/24  0524 05/07/24  2209 05/07/24  1806 05/07/24  1658 05/07/24  0453 05/06/24  1819 05/06/24  0556 05/05/24  0920 05/04/24  0521 05/04/24  0233 05/03/24  3324   WBC Thousand/uL 8.10  --   --  8.36  --   --   --  6.00  --   --  6.29   HEMOGLOBIN g/dL 9.0*  --   --  9.8*  --   --   --  10.4*  --   --  9.6*   HEMATOCRIT % 27.4*  --   --  30.2*  --   --   --  33.4*  --   --  30.9*   PLATELETS  Thousands/uL 245  --   --  263  --   --   --  259  --  223 233   POTASSIUM mmol/L 4.4 5.4* 7.3*  --  5.3 5.5* 4.7 5.4*   < >  --  5.0   CHLORIDE mmol/L 101 101 101  --  105 106 107 109*   < >  --  111*   CO2 mmol/L 25 18* 19*  --  22 20* 23 20*   < >  --  20*   BUN mg/dL 54* 84* 80*  --  74* 71* 66* 61*   < >  --  59*   CREATININE mg/dL 7.49* 9.44* 9.02*  --  8.89* 8.25* 7.91* 7.64*   < >  --  7.05*   CALCIUM mg/dL 8.4 8.5 8.7  --  8.1* 8.5 8.5 8.9   < >  --  8.2*   MAGNESIUM mg/dL 2.1  --  2.4  --  2.4 2.7 1.6*  --   --   --   --    PHOSPHORUS mg/dL 5.8*  --  5.9*  --   --   --   --  4.9*  --   --   --     < > = values in this interval not displayed.       PLEASE NOTE:  This encounter was completed utilizing the [employer] Direct Speech Voice Recognition Software. Grammatical errors, random word insertions, pronoun errors and incomplete sentences are occasional consequences of the system due to software limitations, ambient noise and hardware issues.These may be missed by proof reading prior to affixing electronic signature. Any questions or concerns about the content, text or information contained within the body of this dictation should be directly addressed to the physician for clarification. Please do not hesitate to call me directly if you have any any questions or concerns.

## 2024-05-08 NOTE — PLAN OF CARE
Emergent hemodialysis treatment for hyperkalemia/potassium 7.3.  Plan 120 minutes using a 2 K+ bath for one hour and then a 1 K+ bath for one hour.  Treatment review with Dr. NAOMI Mcclain via telephone call.          Post-Dialysis RN Treatment Note    Blood Pressure:  Pre 115/62 mm/Hg  Post 105/67 mmHg   EDW:  TBD   Weight:  Pre:  None Reported   Post 101.8 kg   Mode of weight measurement: Bed Scale   Volume Removed:  1000 ml/500 ml net   Treatment duration 120 minutes    NS given:  No    Treatment shortened:  No   Medications given during Rx:  None Reported   Estimated Kt/V:  None Reported   Access type: Permacath/TDC   Access Issues: No    Report called to primary nurse:  Yes             Problem: METABOLIC, FLUID AND ELECTROLYTES - ADULT  Goal: Electrolytes maintained within normal limits  Description: INTERVENTIONS:  - Monitor labs and assess patient for signs and symptoms of electrolyte imbalances  - Administer electrolyte replacement as ordered  - Monitor response to electrolyte replacements, including repeat lab results as appropriate  - Instruct patient on fluid and nutrition as appropriate  Outcome: Progressing  Goal: Fluid balance maintained  Description: INTERVENTIONS:  - Monitor labs   - Monitor I/O and WT  - Instruct patient on fluid and nutrition as appropriate  - Assess for signs & symptoms of volume excess or deficit  Outcome: Progressing

## 2024-05-08 NOTE — PROGRESS NOTES
Critical Care Interval Transfer Note:    Please refer to progress note from earlier today for full details.     Jerod Worthington is a 63 y.o. with PMH of CKD 4 who presents with shortness of breath. On admission, he was noted to have CEE on CKD with admission creatinine of 7.05 and hypertensive with /120.  There was concern for volume overload with bilateral lower extremity edema, chest x-ray showed bilateral pleural effusion as well as pulmonary vascular congestion.  He has been seen by nephrology and was on Bumex drip.  Additionally cardiology saw him as he was in A-fib flutter with RVR and started on metoprolol 50 mg every 12 hours.  For hypertension, he was on nifedipine 60 mg which was then changed to Cardizem today.  Additionally, Eliquis 2.5 mg was started on 5/4.       Patient went for HD catheter placement.  He was having sinus pauses and heart rates in the 30s.  Rapid response was called at which point 1 mg atropine was given.  During this time, patient maintained consciousness and had appropriate blood pressures.  SLIM called and updated daughter.     Before the rapid response event, he described an episode of emesis.  He denies any shortness of breath, chest pain, headache, dizziness.    -5/8/2024: Patient has not had sinus pause since 7pm last night, potassium significantly improved after dialysis. Patient stable for transfer to [facility].      Barriers to discharge:   Atrial flutter with RVR requiring telemetry monitoring  Electrolyte abnormalities requiring monitoring and potential repletion  CEE on CKD requiring dialysis     Consults: IP CONSULT TO CARDIOLOGY  IP CONSULT TO NEPHROLOGY  INPATIENT CONSULT TO IR  IP CONSULT TO CASE MANAGEMENT  IP CONSULT TO CASE MANAGEMENT    Recommended to review admission imaging for incidental findings and document in discharge navigator: Chart reviewed, no known incidental findings noted at this time.      Discharge Plan: Anticipate discharge in >72 hrs to  undetermined  Central access plan: Patient has multiple central venous catheters.  HD cath in place.  Plan              Patient seen and evaluated by Critical Care today and deemed to be appropriate for transfer to [facility] with Telemetry. Spoke to Dr. Grigsby from hospitalist service to accept transfer. Critical care can be contacted via [employer] with any questions or concerns.

## 2024-05-08 NOTE — ASSESSMENT & PLAN NOTE
Lab Results   Component Value Date    EGFR 7 05/08/2024    EGFR 5 05/07/2024    EGFR 5 05/07/2024    CREATININE 7.49 (H) 05/08/2024    CREATININE 9.44 (H) 05/07/2024    CREATININE 9.02 (H) 05/07/2024   Previously was CKD 3B with baseline creatinine 2.    -He had been on Bumex drip per nephrology as well as oral sodium bicarb. Bumex turned off, given that he had decreasing urine output's, nephrology opted to begin dialysis.  He is s/p permacath placement with IR  -Went for dialysis last night for hyperkalemia K 7.3  -Plan for repeat dialysis this AM    Follow-up phospholipase A2 receptor antibody

## 2024-05-08 NOTE — PROGRESS NOTES
[facility]  Progress Note  Name: Jerod Worthington I  MRN: [id number]  Unit/Bed#: ICU 09 I Date of Admission: 5/3/2024   Date of Service: 5/8/2024 I Hospital Day: 4    Assessment/Plan   Acute renal failure superimposed on stage 3 chronic kidney disease (HCC)  Assessment & Plan  Lab Results   Component Value Date    EGFR 7 05/08/2024    EGFR 5 05/07/2024    EGFR 5 05/07/2024    CREATININE 7.49 (H) 05/08/2024    CREATININE 9.44 (H) 05/07/2024    CREATININE 9.02 (H) 05/07/2024   Previously was CKD 3B with baseline creatinine 2.    -He had been on Bumex drip per nephrology as well as oral sodium bicarb. Bumex turned off, given that he had decreasing urine output's, nephrology opted to begin dialysis.  He is s/p permacath placement with IR  -Went for dialysis last night for hyperkalemia K 7.3  -Plan for repeat dialysis this AM    Follow-up phospholipase A2 receptor antibody     * Atrial flutter (HCC)  Assessment & Plan  New onset atrial flutter this admission, rates previously were controlled in the 60s.    Rapid response was called for 10-second sinus pause post permacath placement.  Prior to rapid response, he had an episode of emesis.  S/p 1 mg atropine.  Suspect that increased vagal tone could have triggered the sinus pause.    -Rate has been consistently HR low 100s on metoprolol 100mg BID, cardiology stated it is appropriate to keep him at this rate as is likely normal physiological response    PLAN:  Continue Eliquis 5 mg twice daily  Per cardiology, continue metoprolol but hold Cardizem  Tigan for nausea, qtc prolonged  Telemetry  Monitoring in ICU  Keep transcutaneous pads on for now    Sinus pause  Assessment & Plan  Rapid response was called for 10-second sinus pause post permacath placement.  Prior to rapid response, he had an episode of emesis.  S/p 1 mg atropine.  Suspect that increased vagal tone could have triggered the sinus pause.    -No sinus pauses since 7pm last night, safe to  continue metoprolol as per cards       Essential hypertension  Assessment & Plan  No home medications  Metoprolol tartrate 100 mg every 12 hours    Type 2 diabetes mellitus with hyperglycemia, with long-term current use of insulin ([facility])  Assessment & Plan  Lab Results   Component Value Date    HGBA1C 7.8 (H) 05/03/2024       Recent Labs     05/07/24  1618 05/07/24  1953 05/07/24  2057 05/08/24  0733   POCGLU 131 160* 193* 96         Blood Sugar Average: Last 72 hrs:  (P) [id number]    He had been receiving Lantus 20 units twice daily but became hypoglycemic with this.  He is now solely on SSI.  Continue SSI and Accu-Cheks    Severe obstructive sleep apnea  Assessment & Plan  Required BiPAP in ED for respiratory distress, off of BiPAP.  Uses CPAP nightly here             Disposition: Critical care    ICU Core Measures     A: Assess, Prevent, and Manage Pain Has pain been assessed? Yes  Need for changes to pain regimen? No   B: Both SAT/SAT  N/A   C: Choice of Sedation RASS Goal: 0 Alert and Calm  Need for changes to sedation or analgesia regimen? No   D: Delirium CAM-ICU: Negative   E: Early Mobility  Plan for early mobility? Yes   F: Family Engagement Plan for family engagement today? Yes       Antibiotic Review: N/A    Review of Invasive Devices:      Central access plan: HD cath in place.  Plan        Prophylaxis:  VTE VTE covered by:  apixaban, Oral, 5 mg at 05/08/24 0806       Stress Ulcer  not ordered         Significant 24hr Events     24hr events: Patient went for HD last night. Had episodes of intermittent confusion last night and tried to get out of bed, was placed on restraints for safety. Currently with no complaints. HR low 100s, no sinus pauses in last 12 hours.     Subjective   Review of Systems   Constitutional:  Negative for fatigue and fever.   HENT:  Negative for congestion and sinus pain.    Respiratory:  Negative for cough, shortness of breath and wheezing.    Cardiovascular:  Negative  for chest pain and palpitations.   Gastrointestinal:  Negative for abdominal pain and vomiting.   Musculoskeletal:  Negative for arthralgias and back pain.   Neurological:  Negative for dizziness, weakness, light-headedness and headaches.      Objective                            Vitals I/O      Most Recent Min/Max in 24hrs   Temp 98.4 °F (36.9 °C) Temp  Min: 59 °F (15 °C)  Max: 99 °F (37.2 °C)   Pulse 101 Pulse  Min: 63  Max: 114   Resp 18 Resp  Min: 9  Max: 22   /86 BP  Min: 101/57  Max: 139/72   O2 Sat 91 % SpO2  Min: 91 %  Max: 100 %      Intake/Output Summary (Last 24 hours) at 5/8/2024 0825  Last data filed at 5/8/2024 0724  Gross per 24 hour   Intake 1237.99 ml   Output 1625 ml   Net -387.01 ml       Diet Eric/CHO Controlled; Consistent Carbohydrate Diet Level 2 (5 carb servings/75 grams CHO/meal)    Invasive Monitoring           Physical Exam   Physical Exam  Vitals reviewed.   Eyes:      General: No scleral icterus.     Extraocular Movements: Extraocular movements intact.   Skin:     General: Skin is warm.      Coloration: Skin is not jaundiced.   HENT:      Head: Normocephalic and atraumatic.      Mouth/Throat:      Mouth: Mucous membranes are moist.   Cardiovascular:      Rate and Rhythm: Tachycardia present. Rhythm irregular.      Heart sounds: No murmur heard.     No friction rub. No gallop.   Musculoskeletal:      Right lower leg: No edema.      Left lower leg: No edema.   Abdominal: General: There is no distension.      Palpations: Abdomen is soft.      Tenderness: There is no abdominal tenderness. There is no guarding.   Constitutional:       General: He is not in acute distress.     Appearance: He is well-developed and well-nourished. He is not ill-appearing or toxic-appearing.      Interventions: He is not sedated, not intubated and not restrained.  Pulmonary:      Effort: Pulmonary effort is normal. No accessory muscle usage, respiratory distress or accessory muscle usage. He is not  intubated.      Breath sounds: No wheezing, rhonchi or rales.   Neurological:      General: No focal deficit present.      Mental Status: He is alert and oriented to person, place and time. Mental status is at baseline. He is CAM ICU negative.      Cranial Nerves: No facial asymmetry.            Diagnostic Studies      EKG: Atrial flutter HR 100s  Imaging:   IR tunneled dialysis catheter placement   Final Result by Neil Eller MD (05/07 1628)   Impression: Successful image guided placement of tunneled central venous hemodialysis catheter         Workstation performed: [id number]         CT head wo contrast   Final Result by Vladimir Bello MD (05/06 1920)      No acute intracranial hemorrhage or large mass effect. If there is concern for acute infarct, MRI is a more sensitive examination.                  Workstation performed: [id number]         CT abdomen pelvis wo contrast   Final Result by Bradley Landon Kocher, MD (05/05 0832)      1. No obstructive uropathy. Bilateral nonobstructing renal calculi measuring up to 8 mm at the lower pole right kidney.   2. Small bilateral pleural effusions with adjacent compressive atelectasis.      Workstation performed: [id number]         XR chest 2 views   Final Result by Linda Aj MD (05/04 0932)      Severe pulmonary edema with small effusions.            Workstation performed: [id number]         XR chest portable ICU    (Results Pending)      I have personally reviewed pertinent reports.       Medications:  Scheduled PRN   apixaban, 5 mg, BID  [Transfer Hold] atropine, 0.5 mg, Once  cephalexin, 250 mg, Q12H BOBBI  chlorhexidine, 15 mL, Q12H BOBBI  insulin lispro, 1-6 Units, TID AC  metoprolol tartrate, 100 mg, Q12H BOBBI  sodium bicarbonate, 325 mg, BID after meals      acetaminophen, 650 mg, Q6H PRN  ceFAZolin, , Continuous PRN  fentaNYL, , PRN  lidocaine-epinephrine 1%-1:982532 buffered, , PRN  ondansetron, , PRN  trimethobenzamide, 200 mg, Q6H PRN        Continuous    ceFAZolin,          Labs:    CBC    Recent Labs     05/07/24  1658 05/08/24  0524   WBC 8.36 8.10   HGB 9.8* 9.0*   HCT 30.2* 27.4*    245     BMP    Recent Labs     05/07/24 2209 05/08/24  0524   SODIUM 132* 136   K 5.4* 4.4    101   CO2 18* 25   AGAP 13 10   BUN 84* 54*   CREATININE 9.44* 7.49*   CALCIUM 8.5 8.4       Coags    Recent Labs     05/07/24  1658   INR 1.35*        Additional Electrolytes  Recent Labs     05/07/24  1658 05/07/24 1806 05/08/24  0524   MG  --  2.4 2.1   PHOS  --  5.9* 5.8*   CAIONIZED 1.03*  --  1.07*          Blood Gas    No recent results  No recent results LFTs  Recent Labs     05/07/24  1806   ALT 17   AST 15   ALKPHOS 125*   ALB 3.2*   TBILI 0.29       Infectious  No recent results  Glucose  Recent Labs     05/07/24  0453 05/07/24  1806 05/07/24 2209 05/08/24  0524   GLUC 92 133 156* 98               Khai Butterfield MD

## 2024-05-08 NOTE — PLAN OF CARE
Patient presents for a 1.5 hour HD session on a 2K2.5Ca bath with a net UF goal of 0.5L as tolerated.    Post-Dialysis RN Treatment Note    Blood Pressure:  Pre 104/61 mm/Hg  Post 103/57 mmHg   EDW  TBD kg    Weight:  Pre 100.0 kg   Post 99.5 kg   Mode of weight measurement: Bed Scale   Volume Removed  500 ml    Treatment duration 90 minutes    NS given  No    Treatment shortened? No   Medications given during Rx None Reported   Estimated Kt/V  Not Applicable   Access type: Permacath/TDC   Access Issues: No    Report called to primary nurse   Yes, Lynda Benton RN    Problem: METABOLIC, FLUID AND ELECTROLYTES - ADULT  Goal: Electrolytes maintained within normal limits  Description: INTERVENTIONS:  - Monitor labs and assess patient for signs and symptoms of electrolyte imbalances  - Administer electrolyte replacement as ordered  - Monitor response to electrolyte replacements, including repeat lab results as appropriate  - Instruct patient on fluid and nutrition as appropriate  Outcome: Progressing  Goal: Fluid balance maintained  Description: INTERVENTIONS:  - Monitor labs   - Monitor I/O and WT  - Instruct patient on fluid and nutrition as appropriate  - Assess for signs & symptoms of volume excess or deficit  Outcome: Progressing

## 2024-05-08 NOTE — CASE MANAGEMENT
Case Management Discharge Planning Note    Patient name Jerod Worthington  Location ICU 09/ICU 09 MRN [id number]  : 1960 Date 2024       Current Admission Date: 5/3/2024  Current Admission Diagnosis:Atrial flutter (HCC)   Patient Active Problem List    Diagnosis Date Noted    Sinus pause 2024    Atrial flutter (HCC) 2024    Scalp abscess 2024    Hypertensive emergency 2024    Stage 3b chronic kidney disease (HCC) 2022    Hyperlipidemia 2022    Acute renal failure superimposed on stage 3 chronic kidney disease (HCC) 2022    Chronic right-sided low back pain with right-sided sciatica 2021    Severe obstructive sleep apnea 10/18/2017    Type 2 diabetes mellitus with hyperglycemia, with long-term current use of insulin (HCC) 2012    History of urinary stone 2012    Essential hypertension 2012      LOS (days): 4  Geometric Mean LOS (GMLOS) (days): 6.1  Days to GMLOS:1.5     OBJECTIVE:  Risk of Unplanned Readmission Score: 15.64         Current admission status: Inpatient   Preferred Pharmacy:   UNKNOWN - FOLLOW UP PRIOR TO DISCHARGE TO E-PRESCRIBE  No address on file      Primary Care Provider: No primary care provider on file.    Primary Insurance: [email or web address]  Secondary Insurance:     DISCHARGE DETAILS:    Discharge planning discussed with:: pt and his daughterAmy  Freedom of Choice: Yes  Comments - Freedom of Choice: OP HD at [facility]    Contacts  Patient Contacts: Erika Bran   Relationship to Patient:: Family  Contact Method: Phone  Phone Number: See face sheet  Reason/Outcome: Discharge Planning, Emergency Contact, Referral, Continuity of Care    Other Referral/Resources/Interventions Provided:  Interventions: Dialysis  Referral Comments: CM met with pt and his daughter, Amy [phone]. Amy reports pt resides with her at: [address] [place] [id number]. CM reviewed consultation for anticipated need for  OP HD. As per pt he is aware of same. [facility] preference is [facility] and a 10/11 a.m. shift. CM reviewed this is not guarenteed, but will request. CM also reviewed pt will need transportation to and from HD. Amy reports that they will need assistance with transportation. CM did provide [facility] application for them to complete. CM reviewed this can take 30-45 days to be approved. CM encouraged them to discuss with family to assist with transport until [facility] can be arranged. Aware CM will f/u once OP HD has been arranged. Pt and family aware if pt does not require HD OP this will be cancelled. CM placed referral for OP HD via [facility]. CM also left VM for pt's daughter, Erika to review above information.    Treatment Team Recommendation: Home  Discharge Destination Plan:: Home

## 2024-05-08 NOTE — PROGRESS NOTES
"Progress Note - Cardiology   Jerod Worthington 63 y.o. male MRN: [id number]  Unit/Bed#: ICU 09 Encounter: [id number]    Assessment:  Principal Problem:    Atrial flutter (HCC)  Active Problems:    Acute renal failure superimposed on stage 3 chronic kidney disease (HCC)    Severe obstructive sleep apnea    Type 2 diabetes mellitus with hyperglycemia, with long-term current use of insulin (HCC)    Essential hypertension    Hypertensive emergency    Scalp abscess    Sinus pause    Atrial flutter:  Slower rates seem to be in the setting of hyperkalemia, also probably some higher vagal tone and he was on the Cardizem which was added. For now, would keep him off of the Cardizem and continue on metoprolol 100 twice daily. Continue telemetry monitoring. There is currently no indication for either permanent or temporary pacemaker. Anticoagulation with Eliquis.    Keep electrolytes controlled with dialysis.    CEE on CKD:  Started HD. Remains volume overloaded. Net negative volume status.      Subjective/Objective     Subjective:  After initial transfer to the ICU, he continued to have some pauses with the underlying a flutter. His potassium was found to be high. He was dialyzed and the Cardizem was held. He did receive yesterday's evening dose of metoprolol. His heart rate has stabilized.    He was reportedly confused overnight and he had restraints on but this morning using the  as the resident was talking to him, he seemed calm, coherent, oriented. The restraints were removed.    Objective:    Vitals: /61   Pulse 100   Temp 98.4 °F (36.9 °C) (Oral)   Resp 13   Ht 5' 4\" (1.626 m)   Wt 100 kg (220 lb 7.4 oz)   SpO2 98%   BMI 37.84 kg/m²   Vitals:    05/07/24 0600 05/08/24 0359   Weight: 101 kg (221 lb 9 oz) 100 kg (220 lb 7.4 oz)     Orthostatic Blood Pressures      Flowsheet Row Most Recent Value   Blood Pressure 104/61 filed at 05/08/2024 0845   Patient Position - Orthostatic VS Lying filed at 05/08/2024 " 0724            Intake/Output Summary (Last 24 hours) at 5/8/2024 0856  Last data filed at 5/8/2024 0845  Gross per 24 hour   Intake 1437.99 ml   Output 1625 ml   Net -187.01 ml     Physical Exam:  General appearance: in bed. Restraints are being removed as I see him.  Head: Normocephalic, without obvious abnormality, atraumatic  Neck: no carotid bruit, no JVD and supple, symmetrical, trachea midline  Lungs: clear to auscultation bilaterally. Normal air entry. Normal effort.  Heart: S1, S2 regular  Abdomen: soft, non-tender; bowel sounds normal; no masses,  no organomegaly  Extremities: pedal edema  Pulses: symmetric bilaterally  Skin: Skin color, texture, turgor normal. No rashes or lesions  Neurologic: Grossly nonfocal.    Medications:    Current Facility-Administered Medications:     acetaminophen (TYLENOL) tablet 650 mg, 650 mg, Oral, Q6H PRN, Ronna Hurtado DO, 650 mg at 05/05/24 0753    apixaban (ELIQUIS) tablet 5 mg, 5 mg, Oral, BID, Ronna Hurtado DO, 5 mg at 05/08/24 0806    [Transfer Hold] atropine 1 mg/10 mL injection 0.5 mg, 0.5 mg, Intravenous, Once, Guillermo Rangel DO    ceFAZolin (ANCEF) IVPB (premix in dextrose), , Intravenous, Continuous PRN, Neil Eller MD, 2,000 mg at 05/07/24 1431    cephalexin (KEFLEX) capsule 250 mg, 250 mg, Oral, Q12H BOBBI, Ronna Hurtado DO, 250 mg at 05/08/24 0807    chlorhexidine (PERIDEX) 0.12 % oral rinse 15 mL, 15 mL, Mouth/Throat, Q12H BOBBI, Ronna Hurtado DO, 15 mL at 05/08/24 0806    fentaNYL injection, , Intravenous, PRN, Neil Eller MD, 25 mcg at 05/07/24 1454    insulin lispro (HumALOG/ADMELOG) 100 units/mL subcutaneous injection 1-6 Units, 1-6 Units, Subcutaneous, TID AC, 1 Units at 05/06/24 1711 **AND** Fingerstick Glucose (POCT), , , TID AC, Ronna uHrtado, DO    lidocaine-epinephrine 1%-1:312379 buffered, , Infiltration, PRN, Neil Eller MD, 10 mL at 05/07/24 1456    metoprolol tartrate  (LOPRESSOR) tablet 100 mg, 100 mg, Oral, Q12H BOBBI, Sohail Stern MD, 100 mg at 05/08/24 0807    ondansetron (ZOFRAN) injection, , , PRN, Neil Eller MD, 4 mg at 05/07/24 1503    sodium bicarbonate tablet 325 mg, 325 mg, Oral, BID after meals, Ronna Hurtado DO, 325 mg at 05/08/24 0807    trimethobenzamide (TIGAN) IM injection 200 mg, 200 mg, Intramuscular, Q6H PRN, Puja Matias PA-C    Lab Results:      Results from last 7 days   Lab Units 05/08/24 0524 05/07/24  1658 05/05/24  0920   WBC Thousand/uL 8.10 8.36 6.00   HEMOGLOBIN g/dL 9.0* 9.8* 10.4*   HEMATOCRIT % 27.4* 30.2* 33.4*   PLATELETS Thousands/uL 245 263 259         Results from last 7 days   Lab Units 05/08/24 0524 05/07/24  2209 05/07/24  1806 05/05/24  0920 05/04/24  0521 05/03/24  2154   SODIUM mmol/L 136 132* 132*   < > 140 138   POTASSIUM mmol/L 4.4 5.4* 7.3*   < > 4.7 5.0   CHLORIDE mmol/L 101 101 101   < > 113* 111*   CO2 mmol/L 25 18* 19*   < > 20* 20*   BUN mg/dL 54* 84* 80*   < > 58* 59*   CREATININE mg/dL 7.49* 9.44* 9.02*   < > 6.97* 7.05*   CALCIUM mg/dL 8.4 8.5 8.7   < > 8.1* 8.2*   ALK PHOS U/L  --   --  125*  --  138* 147*   ALT U/L  --   --  17  --  18 20   AST U/L  --   --  15  --  15 20    < > = values in this interval not displayed.     Results from last 7 days   Lab Units 05/07/24  1658   INR  1.35*     Results from last 7 days   Lab Units 05/08/24 0524 05/07/24  1806 05/07/24  0453   MAGNESIUM mg/dL 2.1 2.4 2.4     Telemetry: Personally reviewed. Atrial flutter. When first transferred to ICU, he continued to have some pauses but not as long as when he was on the floor. Heart rates have stabilized and he is now around 100 and flutter    Echo:  Left Ventricle: Left ventricular cavity size is normal. There is moderate concentric hypertrophy. The left ventricular ejection fraction is 60%. Systolic function is normal. Wall motion is normal.    Right Ventricle: Right ventricular cavity size is normal. Systolic  function is normal.    Mitral Valve: There is moderate regurgitation.

## 2024-05-08 NOTE — ASSESSMENT & PLAN NOTE
Rapid response was called for 10-second sinus pause post permacath placement.  Prior to rapid response, he had an episode of emesis.  S/p 1 mg atropine.  Suspect that increased vagal tone could have triggered the sinus pause.    -No sinus pauses since 7pm last night, safe to continue metoprolol as per cards

## 2024-05-08 NOTE — CASE MANAGEMENT
Case Management Progress Note    Patient name Jerod Worthington  Location ICU 09/ICU 09 MRN [id number]  : 1960 Date 2024       LOS (days): 4  Geometric Mean LOS (GMLOS) (days): 6.1  Days to GMLOS:1.5        OBJECTIVE:    Current admission status: Inpatient  Preferred Pharmacy:   UNKNOWN - FOLLOW UP PRIOR TO DISCHARGE TO E-PRESCRIBE  No address on file      Primary Care Provider: No primary care provider on file.    Primary Insurance: [employer]  Secondary Insurance:     PROGRESS NOTE:    Jack with [facility] contacted CM regarding referral for [facility]. CM confirmed location and preferred shift. As per Jack at this time they have MWF available with a start time between 10:15 a.m. - 11:15 a.m. Jack will send clinicals off to their intake department which will confirm Day and Chair time at location.     AMADEO Porras Coordinator is [employer] and they can be reached at [phone] [facility] [id number] tracking number is [id number]

## 2024-05-08 NOTE — CASE MANAGEMENT
Case Management Progress Note    Patient name Jerod Worthington  Location S /S -01 MRN [id number]  : 1960 Date 2024       LOS (days): 4  Geometric Mean LOS (GMLOS) (days): 6.1  Days to GMLOS:1.4        OBJECTIVE:        Current admission status: Inpatient  Preferred Pharmacy:   UNKNOWN - FOLLOW UP PRIOR TO DISCHARGE TO E-PRESCRIBE  No address on file      Primary Care Provider: No primary care provider on file.    Primary Insurance: [employer]  Secondary Insurance:     PROGRESS NOTE:    Weekly Care Management Length of Stay Review     Current LOS: 4 Days    Most Recent Labs:     Lab Results   Component Value Date/Time    WBC 8.10 2024 05:24 AM    HGB 9.0 (L) 2024 05:24 AM    HCT 27.4 (L) 2024 05:24 AM     2024 05:24 AM    SODIUM 136 2024 05:24 AM    K 4.4 2024 05:24 AM     2024 05:24 AM    CO2 25 2024 05:24 AM    BUN 54 (H) 2024 05:24 AM    CREATININE 7.49 (H) 2024 05:24 AM    GLUC 98 2024 05:24 AM    ALKPHOS 125 (H) 2024 06:06 PM    ALT 17 2024 06:06 PM    AST 15 2024 06:06 PM    ALB 3.2 (L) 2024 06:06 PM    TBILI 0.29 2024 06:06 PM    INR 1.35 (H) 2024 04:58 PM       Most Recent Vitals:   Vitals:    24 1545   BP: 102/65   Pulse: 94   Resp: 19   Temp: 98.6 °F (37 °C)   SpO2: 95%        Identified Barriers to Discharge/Discharge Goals/Care Management Interventions:  CKD, stage IV, volume overload, cards on board, poss new start HD    Intended Discharge Disposition: home w/ possible new start HD.     Expected Discharge Date:  TBD

## 2024-05-08 NOTE — ASSESSMENT & PLAN NOTE
Lab Results   Component Value Date    HGBA1C 7.8 (H) 05/03/2024       Recent Labs     05/07/24  1618 05/07/24 1953 05/07/24 2057 05/08/24  0733   POCGLU 131 160* 193* 96         Blood Sugar Average: Last 72 hrs:  (P) [id number]    He had been receiving Lantus 20 units twice daily but became hypoglycemic with this.  He is now solely on SSI.  Continue SSI and Accu-Cheks

## 2024-05-08 NOTE — CASE MANAGEMENT
Case Management Assessment & Discharge Planning Note    Patient name Jerod Worthington  Location ICU /ICU 09 MRN [id number]  : 1960 Date 2024       Current Admission Date: 5/3/2024  Current Admission Diagnosis:Atrial flutter (HCC)   Patient Active Problem List    Diagnosis Date Noted    Sinus pause 2024    Atrial flutter (HCC) 2024    Scalp abscess 2024    Hypertensive emergency 2024    Stage 3b chronic kidney disease (HCC) 2022    Hyperlipidemia 2022    Acute renal failure superimposed on stage 3 chronic kidney disease (HCC) 2022    Chronic right-sided low back pain with right-sided sciatica 2021    Severe obstructive sleep apnea 10/18/2017    Type 2 diabetes mellitus with hyperglycemia, with long-term current use of insulin (HCC) 2012    History of urinary stone 2012    Essential hypertension 2012      LOS (days): 4  Geometric Mean LOS (GMLOS) (days): 6.1  Days to GMLOS:1.5     OBJECTIVE:    Risk of Unplanned Readmission Score: 15.64         Current admission status: Inpatient       Preferred Pharmacy:   UNKNOWN - FOLLOW UP PRIOR TO DISCHARGE TO E-PRESCRIBE  No address on file      Primary Care Provider: No primary care provider on file.    Primary Insurance: [employer]  Secondary Insurance:     ASSESSMENT:  Active Health Care Proxies    There are no active Health Care Proxies on file.                 Readmission Root Cause  30 Day Readmission: No    Patient Information  Admitted from:: Home  Mental Status: Alert  During Assessment patient was accompanied by: Not accompanied during assessment  Assessment information provided by:: Patient  Primary Caregiver: Self  Support Systems: Self, Daughter  County of Residence: [place]  What city do you live in?: [place]  Home entry access options. Select all that apply.: No steps to enter home  Type of Current Residence: 2 story home  Upon entering residence, is there a bedroom on  the main floor (no further steps)?: No  A bedroom is located on the following floor levels of residence (select all that apply):: 2nd Floor  Upon entering residence, is there a bathroom on the main floor (no further steps)?: Yes  Number of steps to 2nd floor from main floor: One Flight  Living Arrangements: Lives w/ Daughter  Is patient a ?: No    Activities of Daily Living Prior to Admission  Functional Status: Independent  Completes ADLs independently?: Yes  Ambulates independently?: Yes  Does patient use assisted devices?: Yes  Assisted Devices (DME) used: Home Oxygen concentrator  DME Company Name (respiratory supplies): [employer]  O2 Rate(s): 2L at night  Does patient currently own DME?: Yes  What DME does the patient currently own?: Home Oxygen concentrator  Does patient have a history of Outpatient Therapy (PT/OT)?: No  Does the patient have a history of Short-Term Rehab?: No  Does patient have a history of HHC?: No  Does patient currently have HHC?: No         Patient Information Continued  Income Source: Pension/skilled nursing  Does patient have prescription coverage?: Yes  Does patient receive dialysis treatments?: No  Does patient have a history of substance abuse?: No  Does patient have a history of Mental Health Diagnosis?: No         Means of Transportation  Means of Transport to Appts:: Family transport      Social Determinants of Health (SDOH)      Flowsheet Row Most Recent Value   Housing Stability    In the last 12 months, was there a time when you were not able to pay the mortgage or rent on time? N   In the last 12 months, how many places have you lived? 1   In the last 12 months, was there a time when you did not have a steady place to sleep or slept in a shelter (including now)? N   Transportation Needs    In the past 12 months, has lack of transportation kept you from medical appointments or from getting medications? no   In the past 12 months, has lack of transportation kept you from  meetings, work, or from getting things needed for daily living? No   Food Insecurity    Within the past 12 months, you worried that your food would run out before you got the money to buy more. Never true   Within the past 12 months, the food you bought just didn't last and you didn't have money to get more. Never true   Utilities    In the past 12 months has the electric, gas, oil, or water company threatened to shut off services in your home? No            DISCHARGE DETAILS:    Discharge planning discussed with:: Patient  Freedom of Choice: Yes  Comments - Freedom of Choice: Cm s/w patient regarding discharge plans. OP recommended - cm provided out-patient list.  CM contacted family/caregiver?: Yes  Were Treatment Team discharge recommendations reviewed with patient/caregiver?: Yes  Did patient/caregiver verbalize understanding of patient care needs?: Yes  Were patient/caregiver advised of the risks associated with not following Treatment Team discharge recommendations?: Yes    Contacts  Patient Contacts: Erika Rainey DTR  Relationship to Patient:: Family  Contact Method: Phone  Phone Number: See face sheet  Reason/Outcome: Discharge Planning    Requested Home Health Care         Is the patient interested in HHC at discharge?: No    DME Referral Provided  Referral made for DME?: No    Other Referral/Resources/Interventions Provided:  Interventions: Other (Specify)  Referral Comments: CM met with patient and introduced self and role. Patient lives with daughter and walks without AD. Patient is independent with ADLs. Patient uses O2 2L at night only via [employer]. DTR transports patient to appointments. OP PT is recommended - cm provided list of locations. CM to f/u on any other changes in discharge plans.    Would you like to participate in our [facility] service program?  : No - Declined    Treatment Team Recommendation: Home (OP PT recommended.)

## 2024-05-09 ENCOUNTER — APPOINTMENT (INPATIENT)
Dept: DIALYSIS | Facility: HOSPITAL | Age: 64
DRG: 469 | End: 2024-05-09
Payer: MEDICARE

## 2024-05-09 LAB
ANION GAP SERPL CALCULATED.3IONS-SCNC: 8 MMOL/L (ref 4–13)
ATRIAL RATE: 101 BPM
ATRIAL RATE: 214 BPM
ATRIAL RATE: 300 BPM
BASOPHILS # BLD AUTO: 0.05 THOUSANDS/ÂΜL (ref 0–0.1)
BASOPHILS NFR BLD AUTO: 1 % (ref 0–1)
BUN SERPL-MCNC: 52 MG/DL (ref 5–25)
CALCIUM SERPL-MCNC: 8.4 MG/DL (ref 8.4–10.2)
CHLORIDE SERPL-SCNC: 102 MMOL/L (ref 96–108)
CO2 SERPL-SCNC: 26 MMOL/L (ref 21–32)
CREAT SERPL-MCNC: 7.36 MG/DL (ref 0.6–1.3)
EOSINOPHIL # BLD AUTO: 0.2 THOUSAND/ÂΜL (ref 0–0.61)
EOSINOPHIL NFR BLD AUTO: 3 % (ref 0–6)
ERYTHROCYTE [DISTWIDTH] IN BLOOD BY AUTOMATED COUNT: 14 % (ref 11.6–15.1)
GFR SERPL CREATININE-BSD FRML MDRD: 7 ML/MIN/1.73SQ M
GLUCOSE SERPL-MCNC: 134 MG/DL (ref 65–140)
GLUCOSE SERPL-MCNC: 150 MG/DL (ref 65–140)
GLUCOSE SERPL-MCNC: 153 MG/DL (ref 65–140)
GLUCOSE SERPL-MCNC: 174 MG/DL (ref 65–140)
HCT VFR BLD AUTO: 30.4 % (ref 36.5–49.3)
HGB BLD-MCNC: 9.6 G/DL (ref 12–17)
IMM GRANULOCYTES # BLD AUTO: 0.03 THOUSAND/UL (ref 0–0.2)
IMM GRANULOCYTES NFR BLD AUTO: 1 % (ref 0–2)
LYMPHOCYTES # BLD AUTO: 1.62 THOUSANDS/ÂΜL (ref 0.6–4.47)
LYMPHOCYTES NFR BLD AUTO: 25 % (ref 14–44)
MAGNESIUM SERPL-MCNC: 2.1 MG/DL (ref 1.9–2.7)
MCH RBC QN AUTO: 31.1 PG (ref 26.8–34.3)
MCHC RBC AUTO-ENTMCNC: 31.6 G/DL (ref 31.4–37.4)
MCV RBC AUTO: 98 FL (ref 82–98)
MONOCYTES # BLD AUTO: 0.65 THOUSAND/ÂΜL (ref 0.17–1.22)
MONOCYTES NFR BLD AUTO: 10 % (ref 4–12)
NEUTROPHILS # BLD AUTO: 3.91 THOUSANDS/ÂΜL (ref 1.85–7.62)
NEUTS SEG NFR BLD AUTO: 60 % (ref 43–75)
NRBC BLD AUTO-RTO: 0 /100 WBCS
PLATELET # BLD AUTO: 242 THOUSANDS/UL (ref 149–390)
PMV BLD AUTO: 9.4 FL (ref 8.9–12.7)
POTASSIUM SERPL-SCNC: 4.8 MMOL/L (ref 3.5–5.3)
QRS AXIS: -79 DEGREES
QRS AXIS: -81 DEGREES
QRS AXIS: -84 DEGREES
QRSD INTERVAL: 148 MS
QRSD INTERVAL: 150 MS
QRSD INTERVAL: 154 MS
QT INTERVAL: 434 MS
QT INTERVAL: 442 MS
QT INTERVAL: 480 MS
QTC INTERVAL: 503 MS
QTC INTERVAL: 504 MS
QTC INTERVAL: 518 MS
RBC # BLD AUTO: 3.09 MILLION/UL (ref 3.88–5.62)
SODIUM SERPL-SCNC: 136 MMOL/L (ref 135–147)
T WAVE AXIS: 22 DEGREES
T WAVE AXIS: 28 DEGREES
T WAVE AXIS: 82 DEGREES
VENTRICULAR RATE: 70 BPM
VENTRICULAR RATE: 78 BPM
VENTRICULAR RATE: 81 BPM
WBC # BLD AUTO: 6.46 THOUSAND/UL (ref 4.31–10.16)

## 2024-05-09 PROCEDURE — 93010 ELECTROCARDIOGRAM REPORT: CPT | Performed by: INTERNAL MEDICINE

## 2024-05-09 PROCEDURE — 99232 SBSQ HOSP IP/OBS MODERATE 35: CPT | Performed by: INTERNAL MEDICINE

## 2024-05-09 PROCEDURE — 82948 REAGENT STRIP/BLOOD GLUCOSE: CPT

## 2024-05-09 PROCEDURE — 90935 HEMODIALYSIS ONE EVALUATION: CPT | Performed by: INTERNAL MEDICINE

## 2024-05-09 PROCEDURE — 83735 ASSAY OF MAGNESIUM: CPT

## 2024-05-09 PROCEDURE — 80048 BASIC METABOLIC PNL TOTAL CA: CPT

## 2024-05-09 PROCEDURE — 85025 COMPLETE CBC W/AUTO DIFF WBC: CPT

## 2024-05-09 PROCEDURE — 99232 SBSQ HOSP IP/OBS MODERATE 35: CPT

## 2024-05-09 RX ORDER — TORSEMIDE 20 MG/1
40 TABLET ORAL
Status: DISCONTINUED | OUTPATIENT
Start: 2024-05-10 | End: 2024-05-13

## 2024-05-09 RX ADMIN — APIXABAN 5 MG: 5 TABLET, FILM COATED ORAL at 08:46

## 2024-05-09 RX ADMIN — APIXABAN 5 MG: 5 TABLET, FILM COATED ORAL at 17:52

## 2024-05-09 RX ADMIN — CHLORHEXIDINE GLUCONATE 15 ML: 1.2 RINSE ORAL at 20:14

## 2024-05-09 RX ADMIN — METOPROLOL TARTRATE 100 MG: 100 TABLET, FILM COATED ORAL at 08:46

## 2024-05-09 RX ADMIN — INSULIN LISPRO 1 UNITS: 100 INJECTION, SOLUTION INTRAVENOUS; SUBCUTANEOUS at 17:53

## 2024-05-09 RX ADMIN — METOPROLOL TARTRATE 100 MG: 100 TABLET, FILM COATED ORAL at 20:15

## 2024-05-09 RX ADMIN — CHLORHEXIDINE GLUCONATE 15 ML: 1.2 RINSE ORAL at 08:46

## 2024-05-09 NOTE — PROGRESS NOTES
[facility]  Progress Note  Name: Jerod Wortihngton I  MRN: [id number]  Unit/Bed#: S -01 I Date of Admission: 5/3/2024   Date of Service: 5/9/2024 I Hospital Day: 5    Assessment/Plan   * Atrial flutter (HCC)  Assessment & Plan  New-onset atrial flutter  Rates controlled, 65    Plan:  Was started on Eliquis 5mg BID, Cardizem 90mg BID, Metoprolol 100mg BID  Continue Tele  Cardiology consulted, appreciate recommendations - see below  Recommending to hold Cardizem for lower rates  Continue metoprolol 100 mg twice daily  No indication at this time for permanent or temp pacemaker    Sinus pause  Assessment & Plan  S/p atropine after rapid response called for 10-second sinus pause post permacath 4/7  No further episodes, continued on metoprolol  Continue telemetry     Scalp abscess  Assessment & Plan  Patient was noted to have small scalp abscess on admission in ED.  Was drained.  S/p keflex x5 days     Hypertensive emergency  Assessment & Plan  Initially elevated on admission.  Now improved.  Continue metoprolol 100mg BID         Essential hypertension  Assessment & Plan  Presented with chest pain and shortness of breath  No home medications reported  Meeting criteria for hypertensive emergency in ED with BP of 193/120  Nitroglycerin was administered in the ED, with improvement in BP  S/p Bumex drip    Plan:  Continue monitoring BP  Continue metoprolol 100 mg twice daily  Labetolol PRN for SBP >180  Cardiology consulted, appreciate recommendations      Type 2 diabetes mellitus with hyperglycemia, with long-term current use of insulin (HCC)  Assessment & Plan  Home regimen of Lantus 20 units at lunch and at bedtime    Lab Results   Component Value Date    HGBA1C 7.8 (H) 05/03/2024     Recent Labs     05/08/24  1139 05/08/24  1557 05/09/24  0710 05/09/24  1103   POCGLU 132 171* 150* 134     Blood Sugar Average: Last 72 hrs:  (P) 130.5    Patient hypoglycemic to 42 in the AM of 5/6. Pt was  asymptomatic  Suspect Lantus 20 units BID was too high a dose in the setting of CEE  Lantus was discontinued, and sliding scale was continued.  Sugars now within acceptable range    Plan:  Continue sliding scale     Severe obstructive sleep apnea  Assessment & Plan  Hx of BOBBY with CPAP use at home  - patient endorse nightly use  Patient was started on BiPAP for respiratory distress in ED  Patient now off BiPAP    Plan:  Continue CPAP nightly    Acute renal failure superimposed on stage 3 chronic kidney disease (HCC)  Assessment & Plan  POA  Patient with history of stage IIIb CKD  Previously known baseline creatinine of 1.9-2     Recent Labs     05/07/24  2209 05/08/24  0524 05/09/24  0621   CREATININE 9.44* 7.49* 7.36*   EGFR 5 7 7     Estimated Creatinine Clearance: 11.3 mL/min (A) (by C-G formula based on SCr of 7.36 mg/dL (H)).    -Patient placed on Bumex drip 2mg/hr per nephrology with urine output of 5,175 over 24h by the morning of 5/6  -Sodium bicarb decreased to 325mg BID on 5/6, as acid-base balance improved  -Nephrology decreased Bumex drip to 1mg/hr on 5/6, with  over 24h by the morning of 5/7  -Urine output decreased from previous day, while on Bumex drip    Plan:  Discontinued off Bumex drip  Initiated on HD 5/7 due to RR secondary to hyperkalemia   Nephrology consulted appreciate recommendations - see below  S/p dialysis session yesterday  Plan for session today as well 5/9  Discontinue bicarb  Plan to switch to oral diuretics on non HD days - discussed with nephro, they will manage diuretic and dose           VTE Pharmacologic Prophylaxis: VTE Score: 4 Moderate Risk (Score 3-4) - Pharmacological DVT Prophylaxis Ordered: apixaban (Eliquis).    Mobility:   Basic Mobility Inpatient Raw Score: 22  JH-HLM Goal: 7: Walk 25 feet or more  JH-HLM Achieved: 1: Laying in bed  JH-HLM Goal NOT achieved. Continue with multidisciplinary rounding and encourage appropriate mobility to improve upon JH-HLM  goals.    Patient Centered Rounds: I performed bedside rounds with nursing staff today.   Discussions with Specialists or Other Care Team Provider: Nephrology    Education and Discussions with Family / Patient:  Will update daughter.     Total Time Spent on Date of Encounter in care of patient: This time was spent on one or more of the following: performing physical exam; counseling and coordination of care; obtaining or reviewing history; documenting in the medical record; reviewing/ordering tests, medications or procedures; communicating with other healthcare professionals and discussing with patient's family/caregivers.    Current Length of Stay: 5 day(s)  Current Patient Status: Inpatient   Certification Statement: The patient will continue to require additional inpatient hospital stay due to continued management of acute renal failure  Discharge Plan: Anticipate discharge in 24-48 hrs to home.    Code Status: Level 1 - Full Code    Subjective:   Seen and examined.  No acute events overnight. Only endorsing symptoms of sleepiness.     Objective:     Vitals:   Temp (24hrs), Av.6 °F (37 °C), Min:98.1 °F (36.7 °C), Max:99.3 °F (37.4 °C)    Temp:  [98.1 °F (36.7 °C)-99.3 °F (37.4 °C)] 98.1 °F (36.7 °C)  HR:  [46-94] 69  Resp:  [16-19] 16  BP: ()/(58-69) 130/69  SpO2:  [93 %-97 %] 97 %  Body mass index is 37.84 kg/m².     Input and Output Summary (last 24 hours):     Intake/Output Summary (Last 24 hours) at 2024 1149  Last data filed at 2024 1700  Gross per 24 hour   Intake 340 ml   Output --   Net 340 ml       Physical Exam:   Physical Exam  Constitutional:       General: He is not in acute distress.     Appearance: He is ill-appearing (chronically).   Eyes:      Conjunctiva/sclera: Conjunctivae normal.   Cardiovascular:      Heart sounds: Normal heart sounds.   Pulmonary:      Breath sounds: Normal breath sounds.   Abdominal:      Palpations: Abdomen is soft.   Musculoskeletal:      Right lower  leg: Edema present.      Left lower leg: Edema present.   Skin:     General: Skin is warm and dry.        Additional Data:     Labs:  Results from last 7 days   Lab Units 05/09/24  0621   WBC Thousand/uL 6.46   HEMOGLOBIN g/dL 9.6*   HEMATOCRIT % 30.4*   PLATELETS Thousands/uL 242   SEGS PCT % 60   LYMPHO PCT % 25   MONO PCT % 10   EOS PCT % 3     Results from last 7 days   Lab Units 05/09/24  0621 05/07/24  2209 05/07/24  1806   SODIUM mmol/L 136   < > 132*   POTASSIUM mmol/L 4.8   < > 7.3*   CHLORIDE mmol/L 102   < > 101   CO2 mmol/L 26   < > 19*   BUN mg/dL 52*   < > 80*   CREATININE mg/dL 7.36*   < > 9.02*   ANION GAP mmol/L 8   < > 12   CALCIUM mg/dL 8.4   < > 8.7   ALBUMIN g/dL  --   --  3.2*   TOTAL BILIRUBIN mg/dL  --   --  0.29   ALK PHOS U/L  --   --  125*   ALT U/L  --   --  17   AST U/L  --   --  15   GLUCOSE RANDOM mg/dL 153*   < > 133    < > = values in this interval not displayed.     Results from last 7 days   Lab Units 05/07/24  1658   INR  1.35*     Results from last 7 days   Lab Units 05/09/24  1103 05/09/24  0710 05/08/24  1557 05/08/24  1139 05/08/24  0733 05/07/24  2057 05/07/24  1953 05/07/24  1618 05/07/24  1114 05/07/24  0714 05/06/24  1619 05/06/24  1124   POC GLUCOSE mg/dl 134 150* 171* 132 96 193* 160* 131 125 76 156* 168*     Results from last 7 days   Lab Units 05/03/24  1247   HEMOGLOBIN A1C % 7.8*     Results from last 7 days   Lab Units 05/07/24  1658   LACTIC ACID mmol/L 1.3       Lines/Drains:  Invasive Devices       Peripheral Intravenous Line  Duration             Peripheral IV 05/07/24 Right;Lower Arm 2 days              Hemodialysis Catheter  Duration             HD Permanent Double Catheter 1 day                      Telemetry:  Telemetry Orders (From admission, onward)               24 Hour Telemetry Monitoring  Continuous x 24 Hours (Telem)        Question:  Reason for 24 Hour Telemetry  Answer:  Arrhythmias requiring acute medical intervention / PPM or ICD malfunction                      Telemetry Reviewed: Atrial flutter. HR averaging 69  Indication for Continued Telemetry Use: Arrthymias requiring medical therapy             Imaging: Reviewed radiology reports from this admission including: chest xray    Recent Cultures (last 7 days):         Last 24 Hours Medication List:   Current Facility-Administered Medications   Medication Dose Route Frequency Provider Last Rate    acetaminophen  650 mg Oral Q6H PRN Ronna Lucila Brouse, DO      apixaban  5 mg Oral BID Ronna Lucila Brouse, DO      chlorhexidine  15 mL Mouth/Throat Q12H BOBBI Ronna Lucila Brouse, DO      insulin lispro  1-6 Units Subcutaneous TID AC Ronna Moncadause, DO      metoprolol tartrate  100 mg Oral Q12H BOBBI Ronna Lucila Brouse, DO      trimethobenzamide  200 mg Intramuscular Q6H PRN Ronna Hannahbekevin Moncadause, DO          Today, Patient Was Seen By: Gena Shoemaker PA-C    **Please Note: This note may have been constructed using a voice recognition system.**

## 2024-05-09 NOTE — ASSESSMENT & PLAN NOTE
POA  Patient with history of stage IIIb CKD  Previously known baseline creatinine of 1.9-2     Recent Labs     05/07/24  2209 05/08/24  0524 05/09/24  0621   CREATININE 9.44* 7.49* 7.36*   EGFR 5 7 7     Estimated Creatinine Clearance: 11.3 mL/min (A) (by C-G formula based on SCr of 7.36 mg/dL (H)).    -Patient placed on Bumex drip 2mg/hr per nephrology with urine output of 5,175 over 24h by the morning of 5/6  -Sodium bicarb decreased to 325mg BID on 5/6, as acid-base balance improved  -Nephrology decreased Bumex drip to 1mg/hr on 5/6, with  over 24h by the morning of 5/7  -Urine output decreased from previous day, while on Bumex drip    Plan:  Discontinued off Bumex drip  Initiated on HD 5/7 due to RR secondary to hyperkalemia   Nephrology consulted appreciate recommendations - see below  S/p dialysis session yesterday  Plan for session today as well 5/9  Discontinue bicarb  Plan to switch to oral diuretics on non HD days - discussed with nephro, they will manage diuretic and dose

## 2024-05-09 NOTE — PLAN OF CARE
Problem: PAIN - ADULT  Goal: Verbalizes/displays adequate comfort level or baseline comfort level  Description: Interventions:  - Encourage patient to monitor pain and request assistance  - Assess pain using appropriate pain scale  - Administer analgesics based on type and severity of pain and evaluate response  - Implement non-pharmacological measures as appropriate and evaluate response  - Consider cultural and social influences on pain and pain management  - Notify physician/advanced practitioner if interventions unsuccessful or patient reports new pain  Outcome: Progressing     Problem: INFECTION - ADULT  Goal: Absence or prevention of progression during hospitalization  Description: INTERVENTIONS:  - Assess and monitor for signs and symptoms of infection  - Monitor lab/diagnostic results  - Monitor all insertion sites, i.e. indwelling lines, tubes, and drains  - Monitor endotracheal if appropriate and nasal secretions for changes in amount and color  - Phil Campbell appropriate cooling/warming therapies per order  - Administer medications as ordered  - Instruct and encourage patient and family to use good hand hygiene technique  - Identify and instruct in appropriate isolation precautions for identified infection/condition  Outcome: Progressing  Goal: Absence of fever/infection during neutropenic period  Description: INTERVENTIONS:  - Monitor WBC    Outcome: Progressing     Problem: SAFETY ADULT  Goal: Patient will remain free of falls  Description: INTERVENTIONS:  - Educate patient/family on patient safety including physical limitations  - Instruct patient to call for assistance with activity   - Consult OT/PT to assist with strengthening/mobility   - Keep Call bell within reach  - Keep bed low and locked with side rails adjusted as appropriate  - Keep care items and personal belongings within reach  - Initiate and maintain comfort rounds  - Make Fall Risk Sign visible to staff  - Apply yellow socks and bracelet  for high fall risk patients  - Consider moving patient to room near nurses station  Outcome: Progressing  Goal: Maintain or return to baseline ADL function  Description: INTERVENTIONS:  -  Assess patient's ability to carry out ADLs; assess patient's baseline for ADL function and identify physical deficits which impact ability to perform ADLs (bathing, care of mouth/teeth, toileting, grooming, dressing, etc.)  - Assess/evaluate cause of self-care deficits   - Assess range of motion  - Assess patient's mobility; develop plan if impaired  - Assess patient's need for assistive devices and provide as appropriate  - Encourage maximum independence but intervene and supervise when necessary  - Involve family in performance of ADLs  - Assess for home care needs following discharge   - Consider OT consult to assist with ADL evaluation and planning for discharge  - Provide patient education as appropriate  Outcome: Progressing  Goal: Maintains/Returns to pre admission functional level  Description: INTERVENTIONS:  - Perform AM-PAC 6 Click Basic Mobility/ Daily Activity assessment daily.  - Set and communicate daily mobility goal to care team and patient/family/caregiver.   - Collaborate with rehabilitation services on mobility goals if consulted  - Out of bed for toileting  - Record patient progress and toleration of activity level   Outcome: Progressing     Problem: DISCHARGE PLANNING  Goal: Discharge to home or other facility with appropriate resources  Description: INTERVENTIONS:  - Identify barriers to discharge w/patient and caregiver  - Arrange for needed discharge resources and transportation as appropriate  - Identify discharge learning needs (meds, wound care, etc.)  - Arrange for interpretive services to assist at discharge as needed  - Refer to Case Management Department for coordinating discharge planning if the patient needs post-hospital services based on physician/advanced practitioner order or complex needs  related to functional status, cognitive ability, or social support system  Outcome: Progressing     Problem: Knowledge Deficit  Goal: Patient/family/caregiver demonstrates understanding of disease process, treatment plan, medications, and discharge instructions  Description: Complete learning assessment and assess knowledge base.  Interventions:  - Provide teaching at level of understanding  - Provide teaching via preferred learning methods  Outcome: Progressing     Problem: METABOLIC, FLUID AND ELECTROLYTES - ADULT  Goal: Electrolytes maintained within normal limits  Description: INTERVENTIONS:  - Monitor labs and assess patient for signs and symptoms of electrolyte imbalances  - Administer electrolyte replacement as ordered  - Monitor response to electrolyte replacements, including repeat lab results as appropriate  - Instruct patient on fluid and nutrition as appropriate  Outcome: Progressing  Goal: Fluid balance maintained  Description: INTERVENTIONS:  - Monitor labs   - Monitor I/O and WT  - Instruct patient on fluid and nutrition as appropriate  - Assess for signs & symptoms of volume excess or deficit  Outcome: Progressing     Problem: SAFETY,RESTRAINT: NV/NON-SELF DESTRUCTIVE BEHAVIOR  Goal: Remains free of harm/injury (restraint for non violent/non self-detsructive behavior)  Description: INTERVENTIONS:  - Instruct patient/family regarding restraint use   - Assess and monitor physiologic and psychological status   - Provide interventions and comfort measures to meet assessed patient needs   - Identify and implement measures to help patient regain control  - Assess readiness for release of restraint   Outcome: Progressing  Goal: Returns to optimal restraint-free functioning  Description: INTERVENTIONS:  - Assess the patient's behavior and symptoms that indicate continued need for restraint  - Identify and implement measures to help patient regain control  - Assess readiness for release of restraint    Outcome: Progressing     Problem: Prexisting or High Potential for Compromised Skin Integrity  Goal: Skin integrity is maintained or improved  Description: INTERVENTIONS:  - Identify patients at risk for skin breakdown  - Assess and monitor skin integrity  - Assess and monitor nutrition and hydration status  - Monitor labs   - Assess for incontinence   - Turn and reposition patient  - Assist with mobility/ambulation  - Relieve pressure over bony prominences  - Avoid friction and shearing  - Provide appropriate hygiene as needed including keeping skin clean and dry  - Evaluate need for skin moisturizer/barrier cream  - Collaborate with interdisciplinary team   - Patient/family teaching  - Consider wound care consult   Outcome: Progressing

## 2024-05-09 NOTE — PROGRESS NOTES
NEPHROLOGY PROGRESS NOTE   Jerod Worthington 63 y.o. male MRN: [id number]  Unit/Bed#: S -01 Encounter: [id number]    ASSESSMENT & PLAN:   63 y.o. man with PMH of CKD G4 follow-up at [facility] but no labs in 2023, diabetic, obese, hypertension, BOBBY, obesity p/w SOB. Nephrology is consulted for CEE   Acute kidney injury, POA, currently dialysis dependent  -Baseline creatinine: 1.8-2.0 mg/dl  -Admission creatinine: 7.25 mg/dl, peak creatinine 9.44 mg/dL on 5/7 with finding of metabolic acidosis  - Work up:   UA with microscopy: Ordered.  Found to have albumin creatinine ratio 3.1 g  Imaging: CT abdomen pelvis without contrast did not show any obstructive uropathy.  Finding of right renal calculi as well as left renal calculi  -Etiology: Acute kidney injury suspected to be due to cardiorenal syndrome and underlying progression of CKD  -Hospital Course: Patient was started on renal replacement therapy due to worsening of renal function and uremia.  Patient also had rapid response on 5/7 due to hyperkalemia.  Hemodialysis was started on May 7, 2024 due to hyperkalemia/rapid response.  -Access right IJ PermCath placed on May 7  -Plan:   Check UA urine microscopy  Check SPEP UPEP light chain ratio especially in the setting of anemia and proteinuria  So far no renal recovery  Avoid nephrotoxins and dose all medications per EGFR.    Avoid hypotension.     Patient was seen and examined on hemodialysis treatment, tolerating HD.  Reviewed vitals, dialysis prescription and discussed with hemodialysis nurse.  Patient  on  third hemodialysis treatment today.  Plan for ultrafiltration 1 kg     Na+ 138 mEq/L    Na+ Modeling No    K+ KPP 2.0 (Potassium per Protocol)    Bicarb 35 mEq/L    Dialyzer F160    BFR-As tolerated to a maximum of: Other (please specify)    BFR comments 350 ml/min    DFR Other    Other Comments 500 mL/min    Duration of Treatment 4 Hours    Dialysate Temperature (C) 36    Dry Weight: TBD          Target  weight to be determined  Hepatitis panel nonreactive.  Case management on board for outpatient dialysis placement    Chronic Kidney Disease Stage 4  -Outpatient Nephrologist: none   -Baseline Creatinine: 1.8-2.0 since 2021 to June 2022, no labs in 2023  -Etiology: Presumed secondary to diabetic kidney disease, obesity related hyperfiltration.  Has nephrotic range proteinuria  -Avoid Nephrotoxins and Dose all medications per eGFR  -Will need outpatient follow up after discharge.    CKD/MBD/hyperphosphatemia: Last phosphorus level was 5.8, expect improvement with hemodialysis treatment, recommend low phosphorus diet    Fluid overload: Chest x-ray from 5/3 with severe pulmonary edema with small bilateral pleural effusion: Echo with ejection fraction 60% with moderate MR.  Volume status has improved clinically appears euvolemic.  Continue UF with HD.  Started  diuretics on on HD days    Anemia, due to iron deficiency with iron saturation 16%, ferritin 58  -Ordered for IV Venofer 100 mg with each dialysis treatment for total of 10 doses.  Continue to monitor hemoglobin    Hyperkalemia/acidosis: Resolved with hemodialysis initiation, of sodium bicarbonate tablet    Newly diagnosed a flutter: Cardiology on board.  Was started on Eliquis.  Continue metoprolol  Diabetes mellitus type 2: Last A1c 7.8, management per primary team/obstructive sleep apnea on CPAP    Discussed with primary team regarding plan for hemodialysis treatment today and agreed with the plan      SUBJECTIVE:  Patient denies any new complaint of shortness of breath, was seen and examined on hemodialysis treatment    OBJECTIVE:  Current Weight: Weight - Scale: 100 kg (220 lb 7.4 oz)  Vitals:    05/09/24 1430   BP: 142/84   Pulse: 71   Resp: 16   Temp:    SpO2:        Intake/Output Summary (Last 24 hours) at 5/9/2024 1505  Last data filed at 5/9/2024 1240  Gross per 24 hour   Intake 540 ml   Output --   Net 540 ml       Physical Exam  General:  Ill looking,  awake.  Eyes: Conjunctivae pink,  Sclera anicteric  ENT: lips and mucous membranes moist  Neck: supple   Chest: Clear to Auscultation both lungs,  no crackles, ronchus or wheezing.  CVS: S1 & S2 present, normal rate, regular rhythm, no murmur.  Abdomen: soft, non-tender, non-distended, Bowel sounds normoactive  Extremities: no edema of  legs  Skin: no rash  Neuro: awake, alert, oriented x 3   Psych: Mood and affect appropriate      Medications:    Current Facility-Administered Medications:     acetaminophen (TYLENOL) tablet 650 mg, 650 mg, Oral, Q6H PRN, Ronna Lucila Brouse, DO, 650 mg at 05/05/24 0753    apixaban (ELIQUIS) tablet 5 mg, 5 mg, Oral, BID, Ronna Lucila Brouse, DO, 5 mg at 05/09/24 0846    chlorhexidine (PERIDEX) 0.12 % oral rinse 15 mL, 15 mL, Mouth/Throat, Q12H BOBBI, Ronna Lucila Antwanuse, DO, 15 mL at 05/09/24 0846    insulin lispro (HumALOG/ADMELOG) 100 units/mL subcutaneous injection 1-6 Units, 1-6 Units, Subcutaneous, TID AC, 1 Units at 05/06/24 1711 **AND** Fingerstick Glucose (POCT), , , TID AC, Ronna Lucila Brouse, DO    metoprolol tartrate (LOPRESSOR) tablet 100 mg, 100 mg, Oral, Q12H BOBBI, Ronna Lucila Brouse, DO, 100 mg at 05/09/24 0846    trimethobenzamide (TIGAN) IM injection 200 mg, 200 mg, Intramuscular, Q6H PRN, Ronna Lucila Brouse, DO    Invasive Devices:        Lab Results:   Results from last 7 days   Lab Units 05/09/24  0621 05/08/24  0524 05/07/24  2209 05/07/24  1806 05/07/24  1658 05/06/24  0556 05/05/24  0920 05/04/24  0521 05/04/24  0233 05/03/24  2154   WBC Thousand/uL 6.46 8.10  --   --  8.36  --  6.00  --   --  6.29   HEMOGLOBIN g/dL 9.6* 9.0*  --   --  9.8*  --  10.4*  --   --  9.6*   HEMATOCRIT % 30.4* 27.4*  --   --  30.2*  --  33.4*  --   --  30.9*   PLATELETS Thousands/uL 242 245  --   --  263  --  259  --    < > 233   POTASSIUM mmol/L 4.8 4.4 5.4* 7.3*  --    < > 5.4* 4.7  --  5.0   CHLORIDE mmol/L 102 101 101 101  --    < > 109* 113*  --  111*  "  CO2 mmol/L 26 25 18* 19*  --    < > 20* 20*  --  20*   BUN mg/dL 52* 54* 84* 80*  --    < > 61* 58*  --  59*   CREATININE mg/dL 7.36* 7.49* 9.44* 9.02*  --    < > 7.64* 6.97*  --  7.05*   CALCIUM mg/dL 8.4 8.4 8.5 8.7  --    < > 8.9 8.1*  --  8.2*   MAGNESIUM mg/dL 2.1 2.1  --  2.4  --    < >  --   --   --   --    PHOSPHORUS mg/dL  --  5.8*  --  5.9*  --   --  4.9*  --   --   --    ALK PHOS U/L  --   --   --  125*  --   --   --  138*  --  147*   ALT U/L  --   --   --  17  --   --   --  18  --  20   AST U/L  --   --   --  15  --   --   --  15  --  20    < > = values in this interval not displayed.       Previous work up:         Portions of the record may have been created with voice recognition software. Occasional wrong word or \"sound a like\" substitutions may have occurred due to the inherent limitations of voice recognition software. Read the chart carefully and recognize, using context, where substitutions have occurred.If you have any questions, please contact the dictating provider.    "

## 2024-05-09 NOTE — ASSESSMENT & PLAN NOTE
Home regimen of Lantus 20 units at lunch and at bedtime    Lab Results   Component Value Date    HGBA1C 7.8 (H) 05/03/2024     Recent Labs     05/08/24  1139 05/08/24  1557 05/09/24  0710 05/09/24  1103   POCGLU 132 171* 150* 134     Blood Sugar Average: Last 72 hrs:  (P) 130.5    Patient hypoglycemic to 42 in the AM of 5/6. Pt was asymptomatic  Suspect Lantus 20 units BID was too high a dose in the setting of CEE  Lantus was discontinued, and sliding scale was continued.  Sugars now within acceptable range    Plan:  Continue sliding scale

## 2024-05-09 NOTE — PLAN OF CARE
Post-Dialysis RN Treatment Note    Blood Pressure:  Pre:  121/67 mm/Hg  Post: 148/83  mmHg   EDW:  TBD, CEE      Weight:  Unable to weigh patient bed scale not working   Mode of weight measurement: N/A   Volume Removed:  1000 ml    Treatment duration:  240 minutes    NS given:    No    Treatment shortened?     No   Medications given during Rx:     None Reported   Estimated Kt/V:    Not Applicable   Access type:     Temporary HD catheter   Access Issues:     No    Report called to primary nurse   Yes:  Timothy    Problem: METABOLIC, FLUID AND ELECTROLYTES - ADULT  Goal: Electrolytes maintained within normal limits  Description: INTERVENTIONS:  - Monitor labs and assess patient for signs and symptoms of electrolyte imbalances  - Administer electrolyte replacement as ordered  - Monitor response to electrolyte replacements, including repeat lab results as appropriate  - Instruct patient on fluid and nutrition as appropriate  Outcome: Progressing  Goal: Fluid balance maintained  Description: INTERVENTIONS:  - Monitor labs   - Monitor I/O and WT  - Instruct patient on fluid and nutrition as appropriate  - Assess for signs & symptoms of volume excess or deficit  Outcome: Progressing

## 2024-05-09 NOTE — ASSESSMENT & PLAN NOTE
Presented with chest pain and shortness of breath  No home medications reported  Meeting criteria for hypertensive emergency in ED with BP of 193/120  Nitroglycerin was administered in the ED, with improvement in BP  S/p Bumex drip    Plan:  Continue monitoring BP  Continue metoprolol 100 mg twice daily  Labetolol PRN for SBP >180  Cardiology consulted, appreciate recommendations

## 2024-05-09 NOTE — ASSESSMENT & PLAN NOTE
Patient was noted to have small scalp abscess on admission in ED.  Was drained.  S/p keflex x5 days

## 2024-05-09 NOTE — ASSESSMENT & PLAN NOTE
New-onset atrial flutter  Rates controlled, 65    Plan:  Was started on Eliquis 5mg BID, Cardizem 90mg BID, Metoprolol 100mg BID  Continue Tele  Cardiology consulted, appreciate recommendations - see below  Recommending to hold Cardizem for lower rates  Continue metoprolol 100 mg twice daily  No indication at this time for permanent or temp pacemaker

## 2024-05-09 NOTE — ASSESSMENT & PLAN NOTE
S/p atropine after rapid response called for 10-second sinus pause post permacath 4/7  No further episodes, continued on metoprolol  Continue telemetry

## 2024-05-09 NOTE — PROGRESS NOTES
General Cardiology   Progress Note -  Team One   Jerod Worthington 63 y.o. male MRN: [id number]    Unit/Bed#: S -01 Encounter: [id number]    Assessment  1. Newly diagnosed atrial flutter, unclear chronicity.  -ECG on admission demonstrated atrial flutter, RBBB (chronic) rate 140 bpm  -Repeat ECG 5/5; demonstrated atrial flutter with variable AVB, HR 88 bpm. RBBB (chronic)  -Telemetry review - atrial flutter, rate controlled  -Outpatient rate/rhythm control regimen; none.  -Inpatient rate/rhythm control; metoprolol tartrate 100 mg twice daily + Cardizem SR 90 mg q12h  -Started on apixaban 2.5 mg twice daily on 5/4, increased to 5 mg BID on 5/6.  2. Sinus pauses (occurred on 5/7)  Rapid response was called on the evening of 5/7 following placement of his HD cath.  Pt was reported to have had episodes of sinus pauses.  He did receive IV atropine.  Of note he was found to be hyperkalemic with a K+ level of 7.3.  He underwent urgent dialysis treatment.  Oral Cardizem discontinued.  No further recurrence.  3. Volume overload - likely stemming from severe CEE.  -BNP level 220  -Chest x-ray 5/3-severe pulmonary edema with small bilateral pleural effusions.  -TTE 5/4/2024; LVEF 60%, wall motion normal, RV normal size/systolic function, moderate MR.  -Outpatient diuretic regimen; none  -Inpatient volume management; via HD.  -24-hour I&O balance; -685 mL, overall -6.3 L  4. CEE superimposed on CKD stage IV.  Nephrology following.  Currently dialysis dependent.  -Baseline creatinine around 1.89  -Creatinine level 7.25 on admission, currently 7.3  5. Hypertension  -Average 111/64 last recorded 130/69  -Outpatient BP regimen; none  -Inpatient BP regimen; metoprolol tartrate 100 mg twice daily  6. DM type II  -HgbA1c level 7.8  7. BOBBY - on CPAP at HS.     Plan  -Pt denies any specific cardiac or respiratory complaints this a.m.No current supplemental O2 requirements, sats stable on RA.  -Volume status gradually improving.  Volume  "management now via HD per nephrology.   -Remains in atrial flutter, rates are currently well controlled. Continue metoprolol tartrate 100 mg BID   -Continue apixiban 5 mg twice daily  -Strict I's and O's, daily weights, 2 g Na+ diet 2 LFR.  -Monitor renal function and electrolytes closely.  Replete to maintain K+ level 4.0 magnesium level 2.0.  -Continue to monitor on telemetry.      Subjective  Review of Systems   Constitutional: Positive for malaise/fatigue. Negative for chills and fever.   Eyes:  Negative for visual disturbance.   Cardiovascular:  Positive for leg swelling. Negative for chest pain, dyspnea on exertion, orthopnea and palpitations.   Respiratory:  Negative for cough and shortness of breath.    Gastrointestinal:  Negative for abdominal pain.   Neurological:  Negative for dizziness, headaches and light-headedness.       Objective:   Physical Exam  Vitals and nursing note reviewed.   Constitutional:       General: He is not in acute distress.     Appearance: He is obese. He is not diaphoretic.   HENT:      Head: Normocephalic and atraumatic.   Eyes:      General: No scleral icterus.  Cardiovascular:      Rate and Rhythm: Normal rate. Rhythm irregular.      Pulses: Normal pulses.      Heart sounds: Normal heart sounds.   Pulmonary:      Effort: Pulmonary effort is normal.      Breath sounds: Normal breath sounds. No wheezing or rales.   Abdominal:      Palpations: Abdomen is soft.   Musculoskeletal:      Right lower leg: Edema present.      Left lower leg: Edema present.   Skin:     Capillary Refill: Capillary refill takes less than 2 seconds.   Neurological:      Mental Status: He is alert. Mental status is at baseline.   Psychiatric:         Mood and Affect: Mood normal.         Vitals: Blood pressure 130/69, pulse 69, temperature 98.1 °F (36.7 °C), resp. rate 16, height 5' 4\" (1.626 m), weight 100 kg (220 lb 7.4 oz), SpO2 97%.,     Body mass index is 37.84 kg/m².,   Systolic (24hrs), Av , " Min:98 , Max:130     Diastolic (24hrs), Av, Min:57, Max:69      Intake/Output Summary (Last 24 hours) at 2024 0948  Last data filed at 2024 1700  Gross per 24 hour   Intake 640 ml   Output 1000 ml   Net -360 ml     Weight (last 2 days)       Date/Time Weight    24 0600 100 (220.46)    24 0359 100 (220.46)    24 0600 101 (221.56)            LABORATORY RESULTS      CBC with diff:   Results from last 7 days   Lab Units 24  0621 24  0524 24  1658 24  0920 24  0233 24  2154   WBC Thousand/uL 6.46 8.10 8.36 6.00  --  6.29   HEMOGLOBIN g/dL 9.6* 9.0* 9.8* 10.4*  --  9.6*   HEMATOCRIT % 30.4* 27.4* 30.2* 33.4*  --  30.9*   MCV fL 98 97 99* 99*  --  99*   PLATELETS Thousands/uL 242 245 263 259 223 233   RBC Million/uL 3.09* 2.84* 3.06* 3.37*  --  3.12*   MCH pg 31.1 31.7 32.0 30.9  --  30.8   MCHC g/dL 31.6 32.8 32.5 31.1*  --  31.1*   RDW % 14.0 14.1 14.1 14.2  --  14.5   MPV fL 9.4 9.2 9.6 9.2 9.1 9.6   NRBC AUTO /100 WBCs 0 0 0  --   --  0       CMP:  Results from last 7 days   Lab Units 24  0621 24  0524 24  1806 24  0453 24  1819 24  0556 24  0920 24  0521 24  21524  1247   POTASSIUM mmol/L 4.8 4.4 5.4* 7.3* 5.3 5.5* 4.7   < > 4.7   < > 5.0 5.5*   CHLORIDE mmol/L 102 101 101 101 105 106 107   < > 113*   < > 111* 113*   CO2 mmol/L 26 25 18* 19* 22 20* 23   < > 20*   < > 20* 20*   BUN mg/dL 52* 54* 84* 80* 74* 71* 66*   < > 58*   < > 59* 59*   CREATININE mg/dL 7.36* 7.49* 9.44* 9.02* 8.89* 8.25* 7.91*   < > 6.97*   < > 7.05* 7.25*   CALCIUM mg/dL 8.4 8.4 8.5 8.7 8.1* 8.5 8.5   < > 8.1*   < > 8.2* 8.4*   AST U/L  --   --   --  15  --   --   --   --  15  --  20 15   ALT U/L  --   --   --  17  --   --   --   --  18  --  20 16   ALK PHOS U/L  --   --   --  125*  --   --   --   --  138*  --  147* 136*   EGFR ml/min/1.73sq m 7 7 5 5 5 6 6   < > 7   < > 7 8*    < > = values  "in this interval not displayed.       BMP:  Results from last 7 days   Lab Units 05/09/24  0621 05/08/24 0524 05/07/24 2209 05/07/24 1806 05/07/24  0453 05/06/24 1819 05/06/24  0556   POTASSIUM mmol/L 4.8 4.4 5.4* 7.3* 5.3 5.5* 4.7   CHLORIDE mmol/L 102 101 101 101 105 106 107   CO2 mmol/L 26 25 18* 19* 22 20* 23   BUN mg/dL 52* 54* 84* 80* 74* 71* 66*   CREATININE mg/dL 7.36* 7.49* 9.44* 9.02* 8.89* 8.25* 7.91*   CALCIUM mg/dL 8.4 8.4 8.5 8.7 8.1* 8.5 8.5       No results found for: \"NTBNP\"     Results from last 7 days   Lab Units 05/09/24  0621 05/08/24 0524 05/07/24 1806 05/07/24  0453 05/06/24 1819 05/06/24  0556   MAGNESIUM mg/dL 2.1 2.1 2.4 2.4 2.7 1.6*       Results from last 7 days   Lab Units 05/03/24  1247   HEMOGLOBIN A1C % 7.8*             Results from last 7 days   Lab Units 05/07/24  1658   INR  1.35*       Lipid Profile:   No results found for: \"CHOL\"  No results found for: \"HDL\"  No results found for: \"LDLCALC\"  No results found for: \"TRIG\"    Cardiac testing:   No results found for this or any previous visit.    No results found for this or any previous visit.    No results found for this or any previous visit.    No valid procedures specified.  No results found for this or any previous visit.      Meds/Allergies   all current active meds have been reviewed and current meds:   Current Facility-Administered Medications   Medication Dose Route Frequency    acetaminophen (TYLENOL) tablet 650 mg  650 mg Oral Q6H PRN    apixaban (ELIQUIS) tablet 5 mg  5 mg Oral BID    chlorhexidine (PERIDEX) 0.12 % oral rinse 15 mL  15 mL Mouth/Throat Q12H BOBBI    insulin lispro (HumALOG/ADMELOG) 100 units/mL subcutaneous injection 1-6 Units  1-6 Units Subcutaneous TID AC    metoprolol tartrate (LOPRESSOR) tablet 100 mg  100 mg Oral Q12H BOBBI    trimethobenzamide (TIGAN) IM injection 200 mg  200 mg Intramuscular Q6H PRN              Counseling / Coordination of Care  Total floor / unit time spent today 20 minutes.  " Greater than 50% of total time was spent with the patient and / or family counseling and / or coordination of care.      ** Please Note: Dragon 360 Dictation voice to text software may have been used in the creation of this document. **

## 2024-05-10 LAB
ANION GAP SERPL CALCULATED.3IONS-SCNC: 7 MMOL/L (ref 4–13)
BACTERIA UR QL AUTO: ABNORMAL /HPF
BILIRUB UR QL STRIP: NEGATIVE
BUN SERPL-MCNC: 38 MG/DL (ref 5–25)
CALCIUM SERPL-MCNC: 8 MG/DL (ref 8.4–10.2)
CHLORIDE SERPL-SCNC: 102 MMOL/L (ref 96–108)
CLARITY UR: CLEAR
CO2 SERPL-SCNC: 28 MMOL/L (ref 21–32)
COLOR UR: ABNORMAL
CREAT SERPL-MCNC: 6.2 MG/DL (ref 0.6–1.3)
GFR SERPL CREATININE-BSD FRML MDRD: 8 ML/MIN/1.73SQ M
GLUCOSE SERPL-MCNC: 101 MG/DL (ref 65–140)
GLUCOSE SERPL-MCNC: 142 MG/DL (ref 65–140)
GLUCOSE SERPL-MCNC: 149 MG/DL (ref 65–140)
GLUCOSE SERPL-MCNC: 150 MG/DL (ref 65–140)
GLUCOSE SERPL-MCNC: 96 MG/DL (ref 65–140)
GLUCOSE UR STRIP-MCNC: NEGATIVE MG/DL
HGB UR QL STRIP.AUTO: NEGATIVE
KETONES UR STRIP-MCNC: NEGATIVE MG/DL
LEUKOCYTE ESTERASE UR QL STRIP: NEGATIVE
NITRITE UR QL STRIP: NEGATIVE
NON-SQ EPI CELLS URNS QL MICRO: ABNORMAL /HPF
PH UR STRIP.AUTO: 7 [PH]
POTASSIUM SERPL-SCNC: 4.1 MMOL/L (ref 3.5–5.3)
PROT UR STRIP-MCNC: ABNORMAL MG/DL
RBC #/AREA URNS AUTO: ABNORMAL /HPF
SODIUM SERPL-SCNC: 137 MMOL/L (ref 135–147)
SP GR UR STRIP.AUTO: 1.01 (ref 1–1.03)
UROBILINOGEN UR QL STRIP.AUTO: 0.2 E.U./DL
WBC #/AREA URNS AUTO: ABNORMAL /HPF

## 2024-05-10 PROCEDURE — 82948 REAGENT STRIP/BLOOD GLUCOSE: CPT

## 2024-05-10 PROCEDURE — 86334 IMMUNOFIX E-PHORESIS SERUM: CPT | Performed by: INTERNAL MEDICINE

## 2024-05-10 PROCEDURE — 99233 SBSQ HOSP IP/OBS HIGH 50: CPT | Performed by: INTERNAL MEDICINE

## 2024-05-10 PROCEDURE — 99232 SBSQ HOSP IP/OBS MODERATE 35: CPT

## 2024-05-10 PROCEDURE — 97164 PT RE-EVAL EST PLAN CARE: CPT

## 2024-05-10 PROCEDURE — 80048 BASIC METABOLIC PNL TOTAL CA: CPT | Performed by: INTERNAL MEDICINE

## 2024-05-10 PROCEDURE — 83521 IG LIGHT CHAINS FREE EACH: CPT | Performed by: INTERNAL MEDICINE

## 2024-05-10 PROCEDURE — 81001 URINALYSIS AUTO W/SCOPE: CPT | Performed by: INTERNAL MEDICINE

## 2024-05-10 PROCEDURE — 84166 PROTEIN E-PHORESIS/URINE/CSF: CPT | Performed by: INTERNAL MEDICINE

## 2024-05-10 PROCEDURE — 86335 IMMUNFIX E-PHORSIS/URINE/CSF: CPT | Performed by: INTERNAL MEDICINE

## 2024-05-10 PROCEDURE — 97116 GAIT TRAINING THERAPY: CPT

## 2024-05-10 PROCEDURE — 84165 PROTEIN E-PHORESIS SERUM: CPT | Performed by: INTERNAL MEDICINE

## 2024-05-10 RX ADMIN — CHLORHEXIDINE GLUCONATE 15 ML: 1.2 RINSE ORAL at 08:29

## 2024-05-10 RX ADMIN — APIXABAN 5 MG: 5 TABLET, FILM COATED ORAL at 08:29

## 2024-05-10 RX ADMIN — CHLORHEXIDINE GLUCONATE 15 ML: 1.2 RINSE ORAL at 20:45

## 2024-05-10 RX ADMIN — METOPROLOL TARTRATE 100 MG: 100 TABLET, FILM COATED ORAL at 08:29

## 2024-05-10 RX ADMIN — METOPROLOL TARTRATE 100 MG: 100 TABLET, FILM COATED ORAL at 20:45

## 2024-05-10 RX ADMIN — APIXABAN 5 MG: 5 TABLET, FILM COATED ORAL at 17:05

## 2024-05-10 NOTE — ASSESSMENT & PLAN NOTE
Home regimen of Lantus 20 units at lunch and at bedtime    Lab Results   Component Value Date    HGBA1C 7.8 (H) 05/03/2024     Recent Labs     05/08/24  1557 05/09/24  0710 05/09/24  1103 05/09/24  1616   POCGLU 171* 150* 134 174*     Blood Sugar Average: Last 72 hrs:  (P) 140.2073833698086981    Patient hypoglycemic to 42 in the AM of 5/6. Pt was asymptomatic  Suspect Lantus 20 units BID was too high a dose in the setting of CEE  Lantus was discontinued, and sliding scale was continued.  Sugars now within acceptable range    Plan:  Continue sliding scale

## 2024-05-10 NOTE — ASSESSMENT & PLAN NOTE
POA  Patient with history of stage IIIb CKD  Previously known baseline creatinine of 1.9-2     Recent Labs     05/08/24  0524 05/09/24  0621 05/10/24  0550   CREATININE 7.49* 7.36* 6.20*   EGFR 7 7 8     Estimated Creatinine Clearance: 13.3 mL/min (A) (by C-G formula based on SCr of 6.2 mg/dL (H)).    -Patient placed on Bumex drip 2mg/hr per nephrology with urine output of 5,175 over 24h by the morning of 5/6  -Sodium bicarb decreased to 325mg BID on 5/6, as acid-base balance improved  -Nephrology decreased Bumex drip to 1mg/hr on 5/6, with  over 24h by the morning of 5/7  -Urine output decreased from previous day, while on Bumex drip    Plan:  Discontinued off Bumex drip  Initiated on HD 5/7 due to RR secondary to hyperkalemia   Nephrology consulted appreciate recommendations - see below  S/p dialysis session yesterday  Plan for session today as well 5/9  Discontinue bicarb  Plan to switch to oral diuretics on non HD days - discussed with nephro, they will manage diuretic and dose  CM following for discharge needs   Dialysis approved for T,Th,Sat 2nd shift starting 5/14  PT/OT recommending rehab - awaiting placement

## 2024-05-10 NOTE — PHYSICAL THERAPY NOTE
Physical Therapy Re-Evaluation    Patient's Name: Jerod Worthington    Admitting Diagnosis  Abscess or cellulitis of scalp [L03.811]  Pulmonary edema [J81.1]  SOB (shortness of breath) [R06.02]  Acute chest pain [R07.9]  Acute renal failure (ARF) (HCC) [N17.9]    Problem List  Patient Active Problem List   Diagnosis    Acute renal failure superimposed on stage 3 chronic kidney disease (HCC)    Severe obstructive sleep apnea    Type 2 diabetes mellitus with hyperglycemia, with long-term current use of insulin (HCC)    Stage 3b chronic kidney disease (HCC)    Hyperlipidemia    Essential hypertension    Chronic right-sided low back pain with right-sided sciatica    History of urinary stone    Hypertensive emergency    Scalp abscess    Atrial flutter (HCC)    Sinus pause       Past Medical History  History reviewed. No pertinent past medical history.    Past Surgical History  Past Surgical History:   Procedure Laterality Date    IR TUNNELED DIALYSIS CATHETER PLACEMENT  2024        05/10/24 1145   PT Last Visit   PT Visit Date 05/10/24   Note Type   Note type Re-Evaluation   Additional Comments  utilized via iPAD   Pain Assessment   Pain Assessment Tool 0-10   Pain Score No Pain   Restrictions/Precautions   Weight Bearing Precautions Per Order No   Other Precautions Chair Alarm;Bed Alarm;Fall Risk  (language barrier)   Prior Function   Comments please see IE for home set up and PLOF   Cognition   Orientation Level Oriented to person;Oriented to place;Oriented to situation   Following Commands Follows one step commands with increased time or repetition   Comments pt ID by wristband, name and    Subjective   Subjective pt agreeable to PT eval   RLE Assessment   RLE Assessment X  (grossly 3+to 4-/5 difficulty performing 2/2 to translation?)   LLE Assessment   LLE Assessment X  (grossly 3+to 4-/5 difficulty performing 2/2 to translation?)   Coordination   Movements are Fluid and Coordinated 0   Coordination  and Movement Description rigid   Bed Mobility   Supine to Sit 5  Supervision   Additional items HOB elevated;Increased time required   Additional Comments significant time required to complete bed mobility, sits EOB with distant supervision   Transfers   Sit to Stand 4  Minimal assistance   Additional items Assist x 1;Increased time required;Verbal cues   Stand to Sit 4  Minimal assistance   Additional items Assist x 1;Increased time required;Verbal cues   Additional Comments wide JESSENIA, unable to correct with cues, HHA   Ambulation/Elevation   Gait pattern Decreased foot clearance;Wide JESSENIA;Ataxia   Gait Assistance 3  Moderate assist   Additional items Assist x 1;Verbal cues   Assistive Device Other (Comment)  (HHA)   Distance 25'x1   Ambulation/Elevation Additional Comments trial without AD as pt ambualtes with AD at baseline, per RN pt had been a SBA without AD for mobility within room prior to PT re-evaluation. pt with singificant unsteadiness, attempting to reach for foot board of bed and bed side tray table for support   Balance   Static Sitting Fair   Dynamic Sitting Fair -   Static Standing Poor +   Dynamic Standing Poor   Ambulatory Poor  (HHA)   Activity Tolerance   Activity Tolerance Patient limited by fatigue   Medical Staff Made Aware spoke with CM   Nurse Made Aware MARGE menon pre/post   Assessment   Prognosis Fair   Problem List Decreased strength;Decreased endurance;Impaired balance;Decreased mobility;Impaired judgement;Decreased safety awareness;Obesity   Assessment Pt seen for PT re-evalaution this date. Pt oringially admitted from home with SOB, chest pain and flue like symptoms, diagnosed with acute renal failure, hypertensive emergency and aflutter, oringially evaluated by PT during stay and reccomended for level III resources. Pt was supervision in all aspects of mobility. Pt now s/p trasnfer from ICU, on this date, pt requires significantly more assitance with functional transfers and ambualtion.  "Consistently modAx1 for STS transfers during re-evaluation as well as for mobility within room. Pt demonstrates a generally unsteady gait requiring modA for 25'. Pt demonstrates a significant deviation in mobility status as compared to IE. Pt demonstrates increased assitance required for trasnfer performance, increased assistance for ambulation, general LE weakness, poor balance and endurance/activity tolerance. Given this would reccomend a change in discharge disposition to level II resources. PT POC date and goals remian appropriate at this time.   Goals   Patient Goals no direct therapy goals stated by pt today   STG Expiration Date 05/20/24   Short Term Goal #1 Patient PT goals established in order to address pt self reported goal of \"to go home\". Pt will: complete all bed mobility independently in order to promote increased OOB functional mobility and simulate home environment; complete all transfers independently in order to increase safety with functional mobility; ambulate >150ft with LRAD at [place] level in order to increase safety with household and short community functional mobility; negotiate 8-10 stairs with HR assist and S in order to facilitate safe access to his home; demonstrate understanding and independence with LE strengthening HEP; improve ambulatory balance to >/= good grade with LRAD in order to promote safety and increased independence with mobility; improve AM-PAC score to >/= 23/24 in order to increase independence with mobility and decrease burden of care; improve Barthel Index score to >/= 75/100 in order to increase independence and decrease risk of falls.   PT Treatment Day 1   Plan   Treatment/Interventions Functional transfer training;LE strengthening/ROM;Therapeutic exercise;Elevations;Patient/family training;Equipment eval/education;Bed mobility;Gait training;Spoke to nursing;Spoke to case management;OT   PT Frequency 3-5x/wk   Discharge Recommendation   Rehab Resource Intensity " Level, PT II (Moderate Resource Intensity)   AM-PAC Basic Mobility Inpatient   Turning in Flat Bed Without Bedrails 3   Lying on Back to Sitting on Edge of Flat Bed Without Bedrails 3   Moving Bed to Chair 2   Standing Up From Chair Using Arms 2   Walk in Room 2   Climb 3-5 Stairs With Railing 1   Basic Mobility Inpatient Raw Score 13   Basic Mobility Standardized Score 33.99   [facility] Highest Level Of Mobility   -[facility] Goal 4: Move to chair/commode   -[facility] Achieved 4: Move to chair/commode   Additional Treatment Session   Start Time 1130   End Time 1145   Treatment Assessment PT introduced rolling walker for improved overall stability of gait and increased safety. Pt performs STS from bed side chair with minAx1, requiring cues for hand placement. Pt ambualtes initally 20'x2 with RW, pt conintues to require modA for ambulation, requiring significant tactile facilitation for RW management as patient impulsively pushing RW into objects, unable to problem solve manage RW around obstacles. Pt return to bed side recliner, with conintued modA and Dez to perform Stand to sit transfer. Pt performs additional STS with Dez and ambulates 35'x2, with continued modAx1 with RW, pt with x2 LOB during turn negotiation, requiring PT modA to correct for safety. Pt seemingly unaware of LOB. Pt denied feeling LOB. Pt required continued mod A for general unsteadiness and lateral devaitions to return to bed side recliner. performs stand to sit with Dez vc for hand placement. overall pt does not demonstrate a singificant change in functional status with addition of RW, other than tolerates further distances. pt would continue to benefit from skilled PT to address functional deficits and return to improved level of function. continue to reccomend a level II disposition.   Equipment Use RW   Additional Treatment Day 2   End of Consult   Patient Position at End of Consult Bedside chair;Bed/Chair alarm activated;All needs within  reach   The patient's AM-PAC Basic Mobility Inpatient Short Form Raw Score is 13. A Raw score of less than or equal to 16 suggests the patient may benefit from discharge to post-acute rehabilitation services. Please also refer to the recommendation of the Physical Therapist for safe discharge planning.  Boy Yip, PT

## 2024-05-10 NOTE — PLAN OF CARE
Problem: PAIN - ADULT  Goal: Verbalizes/displays adequate comfort level or baseline comfort level  Description: Interventions:  - Encourage patient to monitor pain and request assistance  - Assess pain using appropriate pain scale  - Administer analgesics based on type and severity of pain and evaluate response  - Implement non-pharmacological measures as appropriate and evaluate response  - Consider cultural and social influences on pain and pain management  - Notify physician/advanced practitioner if interventions unsuccessful or patient reports new pain  Outcome: Progressing     Problem: INFECTION - ADULT  Goal: Absence or prevention of progression during hospitalization  Description: INTERVENTIONS:  - Assess and monitor for signs and symptoms of infection  - Monitor lab/diagnostic results  - Monitor all insertion sites, i.e. indwelling lines, tubes, and drains  - Monitor endotracheal if appropriate and nasal secretions for changes in amount and color  - [place] appropriate cooling/warming therapies per order  - Administer medications as ordered  - Instruct and encourage patient and family to use good hand hygiene technique  - Identify and instruct in appropriate isolation precautions for identified infection/condition  Outcome: Progressing  Goal: Absence of fever/infection during neutropenic period  Description: INTERVENTIONS:  - Monitor WBC    Outcome: Progressing     Problem: SAFETY ADULT  Goal: Patient will remain free of falls  Description: INTERVENTIONS:  - Educate patient/family on patient safety including physical limitations  - Instruct patient to call for assistance with activity   - Consult OT/PT to assist with strengthening/mobility   - Keep Call bell within reach  - Keep bed low and locked with side rails adjusted as appropriate  - Keep care items and personal belongings within reach  - Initiate and maintain comfort rounds  - Make Fall Risk Sign visible to staff  - Offer Toileting every 2 Hours,  in advance of need  - Initiate/Maintain bed alarm  - Obtain necessary fall risk management equipment  - Apply yellow socks and bracelet for high fall risk patients  - Consider moving patient to room near nurses station  Outcome: Progressing  Goal: Maintain or return to baseline ADL function  Description: INTERVENTIONS:  -  Assess patient's ability to carry out ADLs; assess patient's baseline for ADL function and identify physical deficits which impact ability to perform ADLs (bathing, care of mouth/teeth, toileting, grooming, dressing, etc.)  - Assess/evaluate cause of self-care deficits   - Assess range of motion  - Assess patient's mobility; develop plan if impaired  - Assess patient's need for assistive devices and provide as appropriate  - Encourage maximum independence but intervene and supervise when necessary  - Involve family in performance of ADLs  - Assess for home care needs following discharge   - Consider OT consult to assist with ADL evaluation and planning for discharge  - Provide patient education as appropriate  Outcome: Progressing  Goal: Maintains/Returns to pre admission functional level  Description: INTERVENTIONS:  - Perform AM-PAC 6 Click Basic Mobility/ Daily Activity assessment daily.  - Set and communicate daily mobility goal to care team and patient/family/caregiver.   - Collaborate with rehabilitation services on mobility goals if consulted  - Perform Range of Motion 3 times a day.  - Reposition patient every 2 hours.  - Dangle patient 3 times a day  - Stand patient 3 times a day  - Ambulate patient 3 times a day  - Out of bed to chair 3 times a day   - Out of bed for meals 3 times a day  - Out of bed for toileting  - Record patient progress and toleration of activity level   Outcome: Progressing     Problem: DISCHARGE PLANNING  Goal: Discharge to home or other facility with appropriate resources  Description: INTERVENTIONS:  - Identify barriers to discharge w/patient and caregiver  -  Arrange for needed discharge resources and transportation as appropriate  - Identify discharge learning needs (meds, wound care, etc.)  - Arrange for interpretive services to assist at discharge as needed  - Refer to Case Management Department for coordinating discharge planning if the patient needs post-hospital services based on physician/advanced practitioner order or complex needs related to functional status, cognitive ability, or social support system  Outcome: Progressing     Problem: Knowledge Deficit  Goal: Patient/family/caregiver demonstrates understanding of disease process, treatment plan, medications, and discharge instructions  Description: Complete learning assessment and assess knowledge base.  Interventions:  - Provide teaching at level of understanding  - Provide teaching via preferred learning methods  Outcome: Progressing     Problem: METABOLIC, FLUID AND ELECTROLYTES - ADULT  Goal: Electrolytes maintained within normal limits  Description: INTERVENTIONS:  - Monitor labs and assess patient for signs and symptoms of electrolyte imbalances  - Administer electrolyte replacement as ordered  - Monitor response to electrolyte replacements, including repeat lab results as appropriate  - Instruct patient on fluid and nutrition as appropriate  Outcome: Progressing  Goal: Fluid balance maintained  Description: INTERVENTIONS:  - Monitor labs   - Monitor I/O and WT  - Instruct patient on fluid and nutrition as appropriate  - Assess for signs & symptoms of volume excess or deficit  Outcome: Progressing     Problem: SAFETY,RESTRAINT: NV/NON-SELF DESTRUCTIVE BEHAVIOR  Goal: Remains free of harm/injury (restraint for non violent/non self-detsructive behavior)  Description: INTERVENTIONS:  - Instruct patient/family regarding restraint use   - Assess and monitor physiologic and psychological status   - Provide interventions and comfort measures to meet assessed patient needs   - Identify and implement measures to  help patient regain control  - Assess readiness for release of restraint   Outcome: Progressing  Goal: Returns to optimal restraint-free functioning  Description: INTERVENTIONS:  - Assess the patient's behavior and symptoms that indicate continued need for restraint  - Identify and implement measures to help patient regain control  - Assess readiness for release of restraint   Outcome: Progressing     Problem: Prexisting or High Potential for Compromised Skin Integrity  Goal: Skin integrity is maintained or improved  Description: INTERVENTIONS:  - Identify patients at risk for skin breakdown  - Assess and monitor skin integrity  - Assess and monitor nutrition and hydration status  - Monitor labs   - Assess for incontinence   - Turn and reposition patient  - Assist with mobility/ambulation  - Relieve pressure over bony prominences  - Avoid friction and shearing  - Provide appropriate hygiene as needed including keeping skin clean and dry  - Evaluate need for skin moisturizer/barrier cream  - Collaborate with interdisciplinary team   - Patient/family teaching  - Consider wound care consult   Outcome: Progressing

## 2024-05-10 NOTE — PLAN OF CARE
Problem: PHYSICAL THERAPY ADULT  Goal: Performs mobility at highest level of function for planned discharge setting.  See evaluation for individualized goals.  Description: Treatment/Interventions: Functional transfer training, LE strengthening/ROM, Elevations, Therapeutic exercise, Endurance training, Patient/family training, Equipment eval/education, Bed mobility, Gait training, Spoke to nursing, Spoke to case management    Equipment Recommended: Walker    See flowsheet documentation for full assessment, interventions and recommendations.  Outcome: Not Progressing  Note: Prognosis: Fair  Problem List: Decreased strength, Decreased endurance, Impaired balance, Decreased mobility, Impaired judgement, Decreased safety awareness, Obesity  Assessment: Pt seen for PT re-evalaution this date. Pt oringially admitted from home with SOB, chest pain and flue like symptoms, diagnosed with acute renal failure, hypertensive emergency and aflutter, oringially evaluated by PT during stay and reccomended for level III resources. Pt was supervision in all aspects of mobility. Pt now s/p trasnfer from ICU, on this date, pt requires significantly more assitance with functional transfers and ambualtion. Consistently modAx1 for STS transfers during re-evaluation as well as for mobility within room. Pt demonstrates a generally unsteady gait requiring modA for 25'. Pt demonstrates a significant deviation in mobility status as compared to IE. Pt demonstrates increased assitance required for trasnfer performance, increased assistance for ambulation, general LE weakness, poor balance and endurance/activity tolerance. Given this would reccomend a change in discharge disposition to level II resources. PT POC date and goals remian appropriate at this time.  Barriers to Discharge: Inaccessible home environment (pending pt's progress with elevations as pt has full flight of steps to access his home)     Rehab Resource Intensity Level, PT: II  (Moderate Resource Intensity)    See flowsheet documentation for full assessment.

## 2024-05-10 NOTE — PROGRESS NOTES
NEPHROLOGY PROGRESS NOTE   Jerod Worthington 63 y.o. male MRN: [id number]  Unit/Bed#: S -01 Encounter: [id number]    ASSESSMENT & PLAN:   63 y.o. man with PMH of CKD G4 follow-up at [facility] but no labs in 2023, diabetic, obese, hypertension, BOBBY, obesity p/w SOB. Nephrology is consulted for CEE   Acute kidney injury, POA, currently dialysis dependent  -Baseline creatinine: 1.8-2.0 mg/dl  -Admission creatinine: 7.25 mg/dl, peak creatinine 9.44 mg/dL on 5/7 with finding of metabolic acidosis  - Work up:   UA with microscopy: UA with 2+ protein, 1-2 RBC per high-power field.  found to have albumin creatinine ratio 3.1 g  Imaging: CT abdomen pelvis without contrast did not show any obstructive uropathy.  Finding of right renal calculi as well as left renal calculi  -Etiology: Acute kidney injury suspected to be due to cardiorenal syndrome and underlying progression of CKD  -Hospital Course: Patient was started on renal replacement therapy due to worsening of renal function and uremia.  Patient also had rapid response on 5/7 due to hyperkalemia.  Hemodialysis was started on May 7, 2024 due to hyperkalemia/rapid response.  -Access right IJ PermCath placed on May 7  -Plan:   Check SPEP UPEP light chain ratio especially in the setting of anemia and proteinuria  So far no renal recovery  Avoid nephrotoxins and dose all medications per EGFR.    Avoid hypotension.     Clinically appears fluid overloaded, plan for more ultrafiltration with hemodialysis treatment, next hemodialysis treatment tomorrow and will be on schedule for TTS.  As outpatient patient will be on TTS schedule as per my discussion with primary team.  Need to confirm which unit he will be going to  Hepatitis panel nonreactive.  Case management on board for outpatient dialysis placement    Chronic Kidney Disease Stage 4  -Outpatient Nephrologist: none   -Baseline Creatinine: 1.8-2.0 since 2021 to June 2022, no labs in 2023  -Etiology: Presumed secondary to  diabetic kidney disease, obesity related hyperfiltration.  Has nephrotic range proteinuria  -Avoid Nephrotoxins and Dose all medications per eGFR  -Will need outpatient follow up after discharge.    CKD/MBD/hyperphosphatemia: Last phosphorus level was 5.8, expect improvement with hemodialysis treatment, recommend low phosphorus diet    Fluid overload: Chest x-ray from 5/3 with severe pulmonary edema with small bilateral pleural effusion: Echo with ejection fraction 60% with moderate MR.  Volume status has improved but still with fluid overload finding of lower extremity edema, continue UF with HD.  Started  diuretics on on HD days on 5/9, continue same dose of diuretics    Anemia, due to iron deficiency with iron saturation 16%, ferritin 58  -on  IV Venofer 100 mg with each dialysis treatment for total of 10 doses.  Continue to monitor hemoglobin, hemoglobin improved to 9.6 g/dL today    Hyperkalemia/acidosis: Resolved with hemodialysis initiation, of sodium bicarbonate tablet    Newly diagnosed a flutter: Cardiology on board.  Was started on Eliquis.  Continue metoprolol  Diabetes mellitus type 2: Last A1c 7.8, management per primary team/obstructive sleep apnea on CPAP    Discussed with primary team advanced practitioner regarding next hemodialysis treatment tomorrow and agree with the plan.  Patient is cleared for outpatient dialysis from nephrology side after hemodialysis treatment tomorrow.  Currently waiting for rehab placement as per my discussion      SUBJECTIVE:   -Still with lower extremity edema but no shortness of breath or chest pain    OBJECTIVE:  Current Weight: Weight - Scale: 98.9 kg (218 lb 0.6 oz)  Vitals:    05/10/24 0721   BP: 125/77   Pulse: 74   Resp: 16   Temp: 98.4 °F (36.9 °C)   SpO2: 98%       Intake/Output Summary (Last 24 hours) at 5/10/2024 1459  Last data filed at 5/10/2024 0601  Gross per 24 hour   Intake 300 ml   Output 1300 ml   Net -1000 ml       Physical Exam  General:  Ill  looking, awake.  Eyes: Conjunctivae pink,  Sclera anicteric  ENT: lips and mucous membranes moist  Neck: supple   Chest: Clear to Auscultation both lungs,  no crackles, ronchus or wheezing.  CVS: S1 & S2 present, normal rate, regular rhythm, no murmur.  Abdomen: soft, non-tender, non-distended, Bowel sounds normoactive  Extremities: 1 +  edema of  legs  Skin: no rash  Neuro: awake, alert, oriented  Psych: Mood and affect appropriate    Medications:    Current Facility-Administered Medications:     acetaminophen (TYLENOL) tablet 650 mg, 650 mg, Oral, Q6H PRN, Ronna Hurtado, DO, 650 mg at 05/05/24 0753    apixaban (ELIQUIS) tablet 5 mg, 5 mg, Oral, BID, Ronna Moncadause, DO, 5 mg at 05/10/24 0829    chlorhexidine (PERIDEX) 0.12 % oral rinse 15 mL, 15 mL, Mouth/Throat, Q12H BOBBI, Ronna Hurtado, DO, 15 mL at 05/10/24 0829    insulin lispro (HumALOG/ADMELOG) 100 units/mL subcutaneous injection 1-6 Units, 1-6 Units, Subcutaneous, TID AC, 1 Units at 05/09/24 1753 **AND** Fingerstick Glucose (POCT), , , TID AC, Ronna Moncadause, DO    metoprolol tartrate (LOPRESSOR) tablet 100 mg, 100 mg, Oral, Q12H BOBBI, Ronna Hurtado, DO, 100 mg at 05/10/24 0829    torsemide (DEMADEX) tablet 40 mg, 40 mg, Oral, Once per day on Sunday Tuesday Thursday Saturday, Garrison Gutierrez MD    trimethobenzamide (TIGAN) IM injection 200 mg, 200 mg, Intramuscular, Q6H PRN, Ronna Hurtado,     Invasive Devices:        Lab Results:   Results from last 7 days   Lab Units 05/10/24  0550 05/09/24  0621 05/08/24  0524 05/07/24  2209 05/07/24  1806 05/07/24  1658 05/06/24  0556 05/05/24  0920 05/04/24  0521 05/04/24  0233 05/03/24  2154   WBC Thousand/uL  --  6.46 8.10  --   --  8.36  --  6.00  --   --  6.29   HEMOGLOBIN g/dL  --  9.6* 9.0*  --   --  9.8*  --  10.4*  --   --  9.6*   HEMATOCRIT %  --  30.4* 27.4*  --   --  30.2*  --  33.4*  --   --  30.9*   PLATELETS Thousands/uL  --  242 245  --   --  263  --  " 259  --    < > 233   POTASSIUM mmol/L 4.1 4.8 4.4   < > 7.3*  --    < > 5.4* 4.7  --  5.0   CHLORIDE mmol/L 102 102 101   < > 101  --    < > 109* 113*  --  111*   CO2 mmol/L 28 26 25   < > 19*  --    < > 20* 20*  --  20*   BUN mg/dL 38* 52* 54*   < > 80*  --    < > 61* 58*  --  59*   CREATININE mg/dL 6.20* 7.36* 7.49*   < > 9.02*  --    < > 7.64* 6.97*  --  7.05*   CALCIUM mg/dL 8.0* 8.4 8.4   < > 8.7  --    < > 8.9 8.1*  --  8.2*   MAGNESIUM mg/dL  --  2.1 2.1  --  2.4  --    < >  --   --   --   --    PHOSPHORUS mg/dL  --   --  5.8*  --  5.9*  --   --  4.9*  --   --   --    ALK PHOS U/L  --   --   --   --  125*  --   --   --  138*  --  147*   ALT U/L  --   --   --   --  17  --   --   --  18  --  20   AST U/L  --   --   --   --  15  --   --   --  15  --  20    < > = values in this interval not displayed.       Previous work up:         Portions of the record may have been created with voice recognition software. Occasional wrong word or \"sound a like\" substitutions may have occurred due to the inherent limitations of voice recognition software. Read the chart carefully and recognize, using context, where substitutions have occurred.If you have any questions, please contact the dictating provider.    "

## 2024-05-10 NOTE — PROGRESS NOTES
[facility]  Progress Note  Name: Jerod Worthington I  MRN: [id number]  Unit/Bed#: S -01 I Date of Admission: 5/3/2024   Date of Service: 5/10/2024 I Hospital Day: 6    Assessment/Plan   * Atrial flutter (HCC)  Assessment & Plan  New-onset atrial flutter  Rates controlled, 65    Plan:  Was started on Eliquis 5mg BID, Cardizem 90mg BID, Metoprolol 100mg BID  Continue Tele  Cardiology consulted, appreciate recommendations - see below  Recommending to hold Cardizem for lower rates  Continue metoprolol 100 mg twice daily  No indication at this time for permanent or temp pacemaker    Acute renal failure superimposed on stage 3 chronic kidney disease (HCC)  Assessment & Plan  POA  Patient with history of stage IIIb CKD  Previously known baseline creatinine of 1.9-2     Recent Labs     05/08/24  0524 05/09/24  0621 05/10/24  0550   CREATININE 7.49* 7.36* 6.20*   EGFR 7 7 8     Estimated Creatinine Clearance: 13.3 mL/min (A) (by C-G formula based on SCr of 6.2 mg/dL (H)).    -Patient placed on Bumex drip 2mg/hr per nephrology with urine output of 5,175 over 24h by the morning of 5/6  -Sodium bicarb decreased to 325mg BID on 5/6, as acid-base balance improved  -Nephrology decreased Bumex drip to 1mg/hr on 5/6, with  over 24h by the morning of 5/7  -Urine output decreased from previous day, while on Bumex drip    Plan:  Discontinued off Bumex drip  Initiated on HD 5/7 due to RR secondary to hyperkalemia   Nephrology consulted appreciate recommendations - see below  S/p dialysis session yesterday  Plan for session today as well 5/9  Discontinue bicarb  Plan to switch to oral diuretics on non HD days - discussed with nephro, they will manage diuretic and dose  CM following for discharge needs   Dialysis approved for T,Th,Sat 2nd shift starting 5/14  PT/OT recommending rehab - awaiting placement       Sinus pause  Assessment & Plan  S/p atropine after rapid response called for 10-second sinus  pause post permacath 4/7  No further episodes, continued on metoprolol  Continue telemetry     Scalp abscess  Assessment & Plan  Patient was noted to have small scalp abscess on admission in ED.  Was drained.  S/p keflex x5 days     Hypertensive emergency  Assessment & Plan  Initially elevated on admission.  Now improved.  Continue metoprolol 100mg BID         Essential hypertension  Assessment & Plan  Presented with chest pain and shortness of breath  No home medications reported  Meeting criteria for hypertensive emergency in ED with BP of 193/120  Nitroglycerin was administered in the ED, with improvement in BP  S/p Bumex drip    Plan:  Continue monitoring BP  Continue metoprolol 100 mg twice daily  Labetolol PRN for SBP >180  Cardiology consulted, appreciate recommendations      Type 2 diabetes mellitus with hyperglycemia, with long-term current use of insulin ([facility])  Assessment & Plan  Home regimen of Lantus 20 units at lunch and at bedtime    Lab Results   Component Value Date    HGBA1C 7.8 (H) 05/03/2024     Recent Labs     05/08/24  1557 05/09/24  0710 05/09/24  1103 05/09/24  1616   POCGLU 171* 150* 134 174*     Blood Sugar Average: Last 72 hrs:  (P) [id number]    Patient hypoglycemic to 42 in the AM of 5/6. Pt was asymptomatic  Suspect Lantus 20 units BID was too high a dose in the setting of CEE  Lantus was discontinued, and sliding scale was continued.  Sugars now within acceptable range    Plan:  Continue sliding scale     Severe obstructive sleep apnea  Assessment & Plan  Hx of BOBBY with CPAP use at home  - patient endorse nightly use  Patient was started on BiPAP for respiratory distress in ED  Patient now off BiPAP    Plan:  Continue CPAP nightly         VTE Pharmacologic Prophylaxis: VTE Score: 4 Moderate Risk (Score 3-4) - Pharmacological DVT Prophylaxis Ordered: apixaban (Eliquis).    Mobility:   Basic Mobility Inpatient Raw Score: 18  JH-HLM Goal: 6: Walk 10 steps or more  JH-HLM  Achieved: 4: Move to chair/commode  JH-HLM Goal NOT achieved. Continue with multidisciplinary rounding and encourage appropriate mobility to improve upon JH-HLM goals.    Patient Centered Rounds: I performed bedside rounds with nursing staff today.   Discussions with Specialists or Other Care Team Provider: Nephrology    Education and Discussions with Family / Patient:  Will update daughter.     Total Time Spent on Date of Encounter in care of patient: This time was spent on one or more of the following: performing physical exam; counseling and coordination of care; obtaining or reviewing history; documenting in the medical record; reviewing/ordering tests, medications or procedures; communicating with other healthcare professionals and discussing with patient's family/caregivers.    Current Length of Stay: 6 day(s)  Current Patient Status: Inpatient   Certification Statement: The patient will continue to require additional inpatient hospital stay due to pending rehab placement  Discharge Plan: Anticipate discharge in 48-72 hrs to rehab facility.    Code Status: Level 1 - Full Code    Subjective:   [employer]yr[employer] used for interpretation.  Seen and examined.  No acute events overnight.  States he is feeling better than yesterday.  No acute complaints at this time    Objective:     Vitals:   Temp (24hrs), Av.1 °F (36.7 °C), Min:97.2 °F (36.2 °C), Max:98.7 °F (37.1 °C)    Temp:  [97.2 °F (36.2 °C)-98.7 °F (37.1 °C)] 98.4 °F (36.9 °C)  HR:  [65-74] 74  Resp:  [16-18] 16  BP: (111-148)/(68-84) 125/77  SpO2:  [94 %-98 %] 98 %  Body mass index is 37.43 kg/m².     Input and Output Summary (last 24 hours):     Intake/Output Summary (Last 24 hours) at 5/10/2024 1240  Last data filed at 5/10/2024 0601  Gross per 24 hour   Intake 300 ml   Output 1300 ml   Net -1000 ml       Physical Exam:   Physical Exam  Constitutional:       General: He is not in acute distress.     Appearance: He is ill-appearing (chronically).   HENT:       Mouth/Throat:      Mouth: Mucous membranes are moist.   Eyes:      Conjunctiva/sclera: Conjunctivae normal.   Cardiovascular:      Rate and Rhythm: Normal rate.      Heart sounds: Normal heart sounds.   Pulmonary:      Breath sounds: Normal breath sounds.   Musculoskeletal:      Right lower leg: Edema present.      Left lower leg: Edema present.   Skin:     General: Skin is warm and dry.   Neurological:      General: No focal deficit present.          Additional Data:     Labs:  Results from last 7 days   Lab Units 05/09/24  0621   WBC Thousand/uL 6.46   HEMOGLOBIN g/dL 9.6*   HEMATOCRIT % 30.4*   PLATELETS Thousands/uL 242   SEGS PCT % 60   LYMPHO PCT % 25   MONO PCT % 10   EOS PCT % 3     Results from last 7 days   Lab Units 05/10/24  0550 05/07/24  2209 05/07/24  1806   SODIUM mmol/L 137   < > 132*   POTASSIUM mmol/L 4.1   < > 7.3*   CHLORIDE mmol/L 102   < > 101   CO2 mmol/L 28   < > 19*   BUN mg/dL 38*   < > 80*   CREATININE mg/dL 6.20*   < > 9.02*   ANION GAP mmol/L 7   < > 12   CALCIUM mg/dL 8.0*   < > 8.7   ALBUMIN g/dL  --   --  3.2*   TOTAL BILIRUBIN mg/dL  --   --  0.29   ALK PHOS U/L  --   --  125*   ALT U/L  --   --  17   AST U/L  --   --  15   GLUCOSE RANDOM mg/dL 101   < > 133    < > = values in this interval not displayed.     Results from last 7 days   Lab Units 05/07/24  1658   INR  1.35*     Results from last 7 days   Lab Units 05/10/24  1117 05/10/24  0720 05/09/24  1616 05/09/24  1103 05/09/24  0710 05/08/24  1557 05/08/24  1139 05/08/24  0733 05/07/24  2057 05/07/24  1953 05/07/24  1618 05/07/24  1114   POC GLUCOSE mg/dl 149* 96 174* 134 150* 171* 132 96 193* 160* 131 125     Results from last 7 days   Lab Units 05/03/24  1247   HEMOGLOBIN A1C % 7.8*     Results from last 7 days   Lab Units 05/07/24  1658   LACTIC ACID mmol/L 1.3       Lines/Drains:  Invasive Devices       Peripheral Intravenous Line  Duration             Peripheral IV 05/07/24 Right;Lower Arm 3 days              Hemodialysis  Catheter  Duration             HD Permanent Double Catheter 2 days                      Telemetry:  Telemetry Orders (From admission, onward)               24 Hour Telemetry Monitoring  Continuous x 24 Hours (Telem)        Expiring   Question:  Reason for 24 Hour Telemetry  Answer:  Arrhythmias requiring acute medical intervention / PPM or ICD malfunction                     Telemetry Reviewed: Atrial flutter. HR averaging 71  Indication for Continued Telemetry Use: Arrthymias requiring medical therapy             Imaging: No pertinent imaging reviewed.    Recent Cultures (last 7 days):         Last 24 Hours Medication List:   Current Facility-Administered Medications   Medication Dose Route Frequency Provider Last Rate    acetaminophen  650 mg Oral Q6H PRN Ronna Hurtado, DO      apixaban  5 mg Oral BID Ronna Hurtado, DO      chlorhexidine  15 mL Mouth/Throat Q12H [facility] Ronna Hurtado,       insulin lispro  1-6 Units Subcutaneous TID AC Ronna Hurtado,       metoprolol tartrate  100 mg Oral Q12H BOBBI Ronna Hurtado, DO      torsemide  40 mg Oral Once per day on Sunday Tuesday Thursday Saturday Garrison Gutierrez MD      trimethobenzamide  200 mg Intramuscular Q6H PRN Ronna Hurtado DO          Today, Patient Was Seen By: Gena Shoemaker PA-C    **Please Note: This note may have been constructed using a voice recognition system.**

## 2024-05-10 NOTE — PLAN OF CARE
Problem: PAIN - ADULT  Goal: Verbalizes/displays adequate comfort level or baseline comfort level  Description: Interventions:  - Encourage patient to monitor pain and request assistance  - Assess pain using appropriate pain scale  - Administer analgesics based on type and severity of pain and evaluate response  - Implement non-pharmacological measures as appropriate and evaluate response  - Consider cultural and social influences on pain and pain management  - Notify physician/advanced practitioner if interventions unsuccessful or patient reports new pain  Outcome: Progressing     Problem: INFECTION - ADULT  Goal: Absence or prevention of progression during hospitalization  Description: INTERVENTIONS:  - Assess and monitor for signs and symptoms of infection  - Monitor lab/diagnostic results  - Monitor all insertion sites, i.e. indwelling lines, tubes, and drains  - Monitor endotracheal if appropriate and nasal secretions for changes in amount and color  - [place] appropriate cooling/warming therapies per order  - Administer medications as ordered  - Instruct and encourage patient and family to use good hand hygiene technique  - Identify and instruct in appropriate isolation precautions for identified infection/condition  Outcome: Progressing  Goal: Absence of fever/infection during neutropenic period  Description: INTERVENTIONS:  - Monitor WBC    Outcome: Progressing     Problem: SAFETY ADULT  Goal: Patient will remain free of falls  Description: INTERVENTIONS:  - Educate patient/family on patient safety including physical limitations  - Instruct patient to call for assistance with activity   - Consult OT/PT to assist with strengthening/mobility   - Keep Call bell within reach  - Keep bed low and locked with side rails adjusted as appropriate  - Keep care items and personal belongings within reach  - Initiate and maintain comfort rounds  - Make Fall Risk Sign visible to staff  - Apply yellow socks and bracelet  for high fall risk patients  - Consider moving patient to room near nurses station  Outcome: Progressing  Goal: Maintain or return to baseline ADL function  Description: INTERVENTIONS:  -  Assess patient's ability to carry out ADLs; assess patient's baseline for ADL function and identify physical deficits which impact ability to perform ADLs (bathing, care of mouth/teeth, toileting, grooming, dressing, etc.)  - Assess/evaluate cause of self-care deficits   - Assess range of motion  - Assess patient's mobility; develop plan if impaired  - Assess patient's need for assistive devices and provide as appropriate  - Encourage maximum independence but intervene and supervise when necessary  - Involve family in performance of ADLs  - Assess for home care needs following discharge   - Consider OT consult to assist with ADL evaluation and planning for discharge  - Provide patient education as appropriate  Outcome: Progressing  Goal: Maintains/Returns to pre admission functional level  Description: INTERVENTIONS:  - Perform AM-PAC 6 Click Basic Mobility/ Daily Activity assessment daily.  - Set and communicate daily mobility goal to care team and patient/family/caregiver.   - Collaborate with rehabilitation services on mobility goals if consulted  - Out of bed for toileting  - Record patient progress and toleration of activity level   Outcome: Progressing     Problem: DISCHARGE PLANNING  Goal: Discharge to home or other facility with appropriate resources  Description: INTERVENTIONS:  - Identify barriers to discharge w/patient and caregiver  - Arrange for needed discharge resources and transportation as appropriate  - Identify discharge learning needs (meds, wound care, etc.)  - Arrange for interpretive services to assist at discharge as needed  - Refer to Case Management Department for coordinating discharge planning if the patient needs post-hospital services based on physician/advanced practitioner order or complex needs  related to functional status, cognitive ability, or social support system  Outcome: Progressing     Problem: Knowledge Deficit  Goal: Patient/family/caregiver demonstrates understanding of disease process, treatment plan, medications, and discharge instructions  Description: Complete learning assessment and assess knowledge base.  Interventions:  - Provide teaching at level of understanding  - Provide teaching via preferred learning methods  Outcome: Progressing     Problem: METABOLIC, FLUID AND ELECTROLYTES - ADULT  Goal: Electrolytes maintained within normal limits  Description: INTERVENTIONS:  - Monitor labs and assess patient for signs and symptoms of electrolyte imbalances  - Administer electrolyte replacement as ordered  - Monitor response to electrolyte replacements, including repeat lab results as appropriate  - Instruct patient on fluid and nutrition as appropriate  Outcome: Progressing  Goal: Fluid balance maintained  Description: INTERVENTIONS:  - Monitor labs   - Monitor I/O and WT  - Instruct patient on fluid and nutrition as appropriate  - Assess for signs & symptoms of volume excess or deficit  Outcome: Progressing     Problem: SAFETY,RESTRAINT: NV/NON-SELF DESTRUCTIVE BEHAVIOR  Goal: Remains free of harm/injury (restraint for non violent/non self-detsructive behavior)  Description: INTERVENTIONS:  - Instruct patient/family regarding restraint use   - Assess and monitor physiologic and psychological status   - Provide interventions and comfort measures to meet assessed patient needs   - Identify and implement measures to help patient regain control  - Assess readiness for release of restraint   Outcome: Progressing  Goal: Returns to optimal restraint-free functioning  Description: INTERVENTIONS:  - Assess the patient's behavior and symptoms that indicate continued need for restraint  - Identify and implement measures to help patient regain control  - Assess readiness for release of restraint    Outcome: Progressing     Problem: Prexisting or High Potential for Compromised Skin Integrity  Goal: Skin integrity is maintained or improved  Description: INTERVENTIONS:  - Identify patients at risk for skin breakdown  - Assess and monitor skin integrity  - Assess and monitor nutrition and hydration status  - Monitor labs   - Assess for incontinence   - Turn and reposition patient  - Assist with mobility/ambulation  - Relieve pressure over bony prominences  - Avoid friction and shearing  - Provide appropriate hygiene as needed including keeping skin clean and dry  - Evaluate need for skin moisturizer/barrier cream  - Collaborate with interdisciplinary team   - Patient/family teaching  - Consider wound care consult   Outcome: Progressing

## 2024-05-11 ENCOUNTER — APPOINTMENT (INPATIENT)
Dept: DIALYSIS | Facility: HOSPITAL | Age: 64
DRG: 469 | End: 2024-05-11
Attending: INTERNAL MEDICINE
Payer: MEDICARE

## 2024-05-11 PROBLEM — R26.2 AMBULATORY DYSFUNCTION: Status: ACTIVE | Noted: 2024-05-11

## 2024-05-11 LAB
ANION GAP SERPL CALCULATED.3IONS-SCNC: 5 MMOL/L (ref 4–13)
BUN SERPL-MCNC: 47 MG/DL (ref 5–25)
CALCIUM SERPL-MCNC: 7.9 MG/DL (ref 8.4–10.2)
CHLORIDE SERPL-SCNC: 102 MMOL/L (ref 96–108)
CO2 SERPL-SCNC: 29 MMOL/L (ref 21–32)
CREAT SERPL-MCNC: 6.89 MG/DL (ref 0.6–1.3)
ERYTHROCYTE [DISTWIDTH] IN BLOOD BY AUTOMATED COUNT: 13.4 % (ref 11.6–15.1)
GFR SERPL CREATININE-BSD FRML MDRD: 7 ML/MIN/1.73SQ M
GLUCOSE SERPL-MCNC: 110 MG/DL (ref 65–140)
GLUCOSE SERPL-MCNC: 128 MG/DL (ref 65–140)
GLUCOSE SERPL-MCNC: 135 MG/DL (ref 65–140)
GLUCOSE SERPL-MCNC: 145 MG/DL (ref 65–140)
GLUCOSE SERPL-MCNC: 186 MG/DL (ref 65–140)
HCT VFR BLD AUTO: 28.4 % (ref 36.5–49.3)
HGB BLD-MCNC: 9.3 G/DL (ref 12–17)
KAPPA LC FREE SER-MCNC: 152.4 MG/L (ref 3.3–19.4)
KAPPA LC FREE/LAMBDA FREE SER: 1.72 {RATIO} (ref 0.26–1.65)
LAMBDA LC FREE SERPL-MCNC: 88.4 MG/L (ref 5.7–26.3)
MCH RBC QN AUTO: 31.5 PG (ref 26.8–34.3)
MCHC RBC AUTO-ENTMCNC: 32.7 G/DL (ref 31.4–37.4)
MCV RBC AUTO: 96 FL (ref 82–98)
PHOSPHATE SERPL-MCNC: 5.1 MG/DL (ref 2.3–4.1)
PLATELET # BLD AUTO: 235 THOUSANDS/UL (ref 149–390)
PMV BLD AUTO: 9.2 FL (ref 8.9–12.7)
POTASSIUM SERPL-SCNC: 4.9 MMOL/L (ref 3.5–5.3)
RBC # BLD AUTO: 2.95 MILLION/UL (ref 3.88–5.62)
SODIUM SERPL-SCNC: 136 MMOL/L (ref 135–147)
WBC # BLD AUTO: 6.12 THOUSAND/UL (ref 4.31–10.16)

## 2024-05-11 PROCEDURE — 99232 SBSQ HOSP IP/OBS MODERATE 35: CPT | Performed by: INTERNAL MEDICINE

## 2024-05-11 PROCEDURE — 82948 REAGENT STRIP/BLOOD GLUCOSE: CPT

## 2024-05-11 PROCEDURE — 80048 BASIC METABOLIC PNL TOTAL CA: CPT

## 2024-05-11 PROCEDURE — 85027 COMPLETE CBC AUTOMATED: CPT

## 2024-05-11 PROCEDURE — 84100 ASSAY OF PHOSPHORUS: CPT | Performed by: INTERNAL MEDICINE

## 2024-05-11 RX ADMIN — APIXABAN 5 MG: 5 TABLET, FILM COATED ORAL at 08:18

## 2024-05-11 RX ADMIN — APIXABAN 5 MG: 5 TABLET, FILM COATED ORAL at 17:49

## 2024-05-11 RX ADMIN — METOPROLOL TARTRATE 100 MG: 100 TABLET, FILM COATED ORAL at 08:18

## 2024-05-11 RX ADMIN — METOPROLOL TARTRATE 100 MG: 100 TABLET, FILM COATED ORAL at 22:08

## 2024-05-11 RX ADMIN — CHLORHEXIDINE GLUCONATE 15 ML: 1.2 RINSE ORAL at 22:10

## 2024-05-11 RX ADMIN — CHLORHEXIDINE GLUCONATE 15 ML: 1.2 RINSE ORAL at 08:18

## 2024-05-11 RX ADMIN — TORSEMIDE 40 MG: 20 TABLET ORAL at 08:18

## 2024-05-11 NOTE — ASSESSMENT & PLAN NOTE
Status post atropine with rapid response called for 10-second pauses post permacath placement during hospitalization.  Cardiology support appreciated, continue metoprolol

## 2024-05-11 NOTE — PLAN OF CARE
Problem: PAIN - ADULT  Goal: Verbalizes/displays adequate comfort level or baseline comfort level  Description: Interventions:  - Encourage patient to monitor pain and request assistance  - Assess pain using appropriate pain scale  - Administer analgesics based on type and severity of pain and evaluate response  - Implement non-pharmacological measures as appropriate and evaluate response  - Consider cultural and social influences on pain and pain management  - Notify physician/advanced practitioner if interventions unsuccessful or patient reports new pain  Outcome: Progressing     Problem: INFECTION - ADULT  Goal: Absence or prevention of progression during hospitalization  Description: INTERVENTIONS:  - Assess and monitor for signs and symptoms of infection  - Monitor lab/diagnostic results  - Monitor all insertion sites, i.e. indwelling lines, tubes, and drains  - Monitor endotracheal if appropriate and nasal secretions for changes in amount and color  - [place] appropriate cooling/warming therapies per order  - Administer medications as ordered  - Instruct and encourage patient and family to use good hand hygiene technique  - Identify and instruct in appropriate isolation precautions for identified infection/condition  Outcome: Progressing  Goal: Absence of fever/infection during neutropenic period  Description: INTERVENTIONS:  - Monitor WBC    Outcome: Progressing     Problem: SAFETY ADULT  Goal: Patient will remain free of falls  Description: INTERVENTIONS:  - Educate patient/family on patient safety including physical limitations  - Instruct patient to call for assistance with activity   - Consult OT/PT to assist with strengthening/mobility   - Keep Call bell within reach  - Keep bed low and locked with side rails adjusted as appropriate  - Keep care items and personal belongings within reach  - Initiate and maintain comfort rounds  - Make Fall Risk Sign visible to staff  - Offer Toileting every 2 Hours,  in advance of need  - Initiate/Maintain bed alarm  - Obtain necessary fall risk management equipment  - Apply yellow socks and bracelet for high fall risk patients  - Consider moving patient to room near nurses station  Outcome: Progressing  Goal: Maintain or return to baseline ADL function  Description: INTERVENTIONS:  -  Assess patient's ability to carry out ADLs; assess patient's baseline for ADL function and identify physical deficits which impact ability to perform ADLs (bathing, care of mouth/teeth, toileting, grooming, dressing, etc.)  - Assess/evaluate cause of self-care deficits   - Assess range of motion  - Assess patient's mobility; develop plan if impaired  - Assess patient's need for assistive devices and provide as appropriate  - Encourage maximum independence but intervene and supervise when necessary  - Involve family in performance of ADLs  - Assess for home care needs following discharge   - Consider OT consult to assist with ADL evaluation and planning for discharge  - Provide patient education as appropriate  Outcome: Progressing  Goal: Maintains/Returns to pre admission functional level  Description: INTERVENTIONS:  - Perform AM-PAC 6 Click Basic Mobility/ Daily Activity assessment daily.  - Set and communicate daily mobility goal to care team and patient/family/caregiver.   - Collaborate with rehabilitation services on mobility goals if consulted  - Perform Range of Motion 3 times a day.  - Reposition patient every 2 hours.  - Dangle patient 3 times a day  - Stand patient 3 times a day  - Ambulate patient 3 times a day  - Out of bed to chair 3 times a day   - Out of bed for meals 3 times a day  - Out of bed for toileting  - Record patient progress and toleration of activity level   Outcome: Progressing     Problem: DISCHARGE PLANNING  Goal: Discharge to home or other facility with appropriate resources  Description: INTERVENTIONS:  - Identify barriers to discharge w/patient and caregiver  -  Arrange for needed discharge resources and transportation as appropriate  - Identify discharge learning needs (meds, wound care, etc.)  - Arrange for interpretive services to assist at discharge as needed  - Refer to Case Management Department for coordinating discharge planning if the patient needs post-hospital services based on physician/advanced practitioner order or complex needs related to functional status, cognitive ability, or social support system  Outcome: Progressing     Problem: Knowledge Deficit  Goal: Patient/family/caregiver demonstrates understanding of disease process, treatment plan, medications, and discharge instructions  Description: Complete learning assessment and assess knowledge base.  Interventions:  - Provide teaching at level of understanding  - Provide teaching via preferred learning methods  Outcome: Progressing     Problem: METABOLIC, FLUID AND ELECTROLYTES - ADULT  Goal: Electrolytes maintained within normal limits  Description: INTERVENTIONS:  - Monitor labs and assess patient for signs and symptoms of electrolyte imbalances  - Administer electrolyte replacement as ordered  - Monitor response to electrolyte replacements, including repeat lab results as appropriate  - Instruct patient on fluid and nutrition as appropriate  Outcome: Progressing  Goal: Fluid balance maintained  Description: INTERVENTIONS:  - Monitor labs   - Monitor I/O and WT  - Instruct patient on fluid and nutrition as appropriate  - Assess for signs & symptoms of volume excess or deficit  Outcome: Progressing     Problem: SAFETY,RESTRAINT: NV/NON-SELF DESTRUCTIVE BEHAVIOR  Goal: Remains free of harm/injury (restraint for non violent/non self-detsructive behavior)  Description: INTERVENTIONS:  - Instruct patient/family regarding restraint use   - Assess and monitor physiologic and psychological status   - Provide interventions and comfort measures to meet assessed patient needs   - Identify and implement measures to  help patient regain control  - Assess readiness for release of restraint   Outcome: Progressing  Goal: Returns to optimal restraint-free functioning  Description: INTERVENTIONS:  - Assess the patient's behavior and symptoms that indicate continued need for restraint  - Identify and implement measures to help patient regain control  - Assess readiness for release of restraint   Outcome: Progressing     Problem: Prexisting or High Potential for Compromised Skin Integrity  Goal: Skin integrity is maintained or improved  Description: INTERVENTIONS:  - Identify patients at risk for skin breakdown  - Assess and monitor skin integrity  - Assess and monitor nutrition and hydration status  - Monitor labs   - Assess for incontinence   - Turn and reposition patient  - Assist with mobility/ambulation  - Relieve pressure over bony prominences  - Avoid friction and shearing  - Provide appropriate hygiene as needed including keeping skin clean and dry  - Evaluate need for skin moisturizer/barrier cream  - Collaborate with interdisciplinary team   - Patient/family teaching  - Consider wound care consult   Outcome: Progressing     Problem: Nutrition/Hydration-ADULT  Goal: Nutrient/Hydration intake appropriate for improving, restoring or maintaining nutritional needs  Description: Monitor and assess patient's nutrition/hydration status for malnutrition. Collaborate with interdisciplinary team and initiate plan and interventions as ordered.  Monitor patient's weight and dietary intake as ordered or per policy. Utilize nutrition screening tool and intervene as necessary. Determine patient's food preferences and provide high-protein, high-caloric foods as appropriate.     INTERVENTIONS:  - Monitor oral intake, urinary output, labs, and treatment plans  - Assess nutrition and hydration status and recommend course of action  - Evaluate amount of meals eaten  - Assist patient with eating if necessary   - Allow adequate time for meals  -  Recommend/ encourage appropriate diets, oral nutritional supplements, and vitamin/mineral supplements  - Order, calculate, and assess calorie counts as needed  - Recommend, monitor, and adjust tube feedings and TPN/PPN based on assessed needs  - Assess need for intravenous fluids  - Provide specific nutrition/hydration education as appropriate  - Include patient/family/caregiver in decisions related to nutrition  Outcome: Progressing

## 2024-05-11 NOTE — PROCEDURES
Venous Access Line Insertion    Date/Time: 5/11/2024 10:22 AM    Performed by: Bryanna Singh RN  Authorized by: Araceli Castorena MD    Patient location:  Bedside  Consent:     Consent obtained:  Verbal    Consent given by:  Patient  Universal protocol:     Procedure explained and questions answered to patient or proxy's satisfaction: yes    Pre-procedure details:     Hand hygiene: Hand hygiene performed prior to insertion      Sterile barrier technique: All elements of maximal sterile technique followed      Skin preparation:  2% chlorhexidine    Skin preparation agent: Skin preparation agent completely dried prior to procedure    Procedure details:     Complex Venous Access Line Type: US Guided Peripheral IV      Orientation:  Left    Location:  Forearm    Catheter size:  20 gauge    Sterile ultrasound techniques: Sterile gel and sterile probe covers were used      Number of attempts:  1    Successful placement: yes      Landmarks identified: yes    Anesthesia (see MAR for exact dosages):     Anesthesia method:  None  Post-procedure details:     Post-procedure:  Dressing applied    Assessment:  Blood return through all ports    Post-procedure complications: none      Patient tolerance of procedure:  Tolerated well, no immediate complications

## 2024-05-11 NOTE — ASSESSMENT & PLAN NOTE
Patient seen by physical therapy and Occupational Therapy with recommendations for rehab.  Case management met with the family including 2 daughters and patient on May 10 and they agreed to blanket referrals.    Appreciate physical therapy and Occupational Therapy support

## 2024-05-11 NOTE — ASSESSMENT & PLAN NOTE
POA  Patient with history of stage IIIb CKD  Previously known baseline creatinine of 1.9-2     Recent Labs     05/09/24  0621 05/10/24  0550   CREATININE 7.36* 6.20*   EGFR 7 8       Estimated Creatinine Clearance: 13.3 mL/min (A) (by C-G formula based on SCr of 6.2 mg/dL (H)).    -Patient placed on Bumex drip 2mg/hr per nephrology with urine output of 5,175 over 24h by the morning of 5/6  -Sodium bicarb decreased to 325mg BID on 5/6, as acid-base balance improved  -Nephrology decreased Bumex drip to 1mg/hr on 5/6, with  over 24h by the morning of 5/7  -Urine output decreased from previous day, while on Bumex drip    Plan:  Patient discontinued off Bumex drip  Continue oral diuretics of torsemide per nephrology on nondialysis days  Continue dialysis Monday Wednesday Friday while inpatient case management notes a chair has been located for Tuesday Thursday Saturday on discharge  Physical therapy therapy recommended rehab

## 2024-05-11 NOTE — ASSESSMENT & PLAN NOTE
Patient was noted to have abscess on admissions that was drained and patient is s/p keflex x 5 days.

## 2024-05-11 NOTE — ASSESSMENT & PLAN NOTE
Seen by physical therapy and Occupational Therapy, recommendations for rehab.  Family meeting noted May 10 and all in agreement with blanket referrals.

## 2024-05-11 NOTE — ASSESSMENT & PLAN NOTE
Home regimen of Lantus 20 units at lunch and at bedtime    Lab Results   Component Value Date    HGBA1C 7.8 (H) 05/03/2024     Recent Labs     05/10/24  1117 05/10/24  1612 05/10/24  2045 05/11/24  0713   POCGLU 149* 142* 150* 110       Blood Sugar Average: Last 72 hrs:  (P) 136.2501382190335250     Latest Reference Range & Units 05/10/24 07:20 05/10/24 11:17 05/10/24 16:12 05/10/24 20:45 05/11/24 07:13   POC Glucose 65 - 140 mg/dl 96 149 (H) 142 (H) 150 (H) 110     Controlled well on current regimen of Lantus and lispro

## 2024-05-11 NOTE — ASSESSMENT & PLAN NOTE
Presented with chest pain and shortness of breath  No home medications reported  Meeting criteria for hypertensive emergency in ED with BP of 193/120  Nitroglycerin was administered in the ED, with improvement in BP  S/p Bumex drip    Plan:  Continue to monitor blood pressure  Controlled with metoprolol and Cardizem  Appreciate cardiology recommendations

## 2024-05-11 NOTE — PROGRESS NOTES
[facility]  Progress Note  Name: Jerod Worthington I  MRN: [id number]  Unit/Bed#: S -01 I Date of Admission: 5/3/2024   Date of Service: 5/11/2024 I Hospital Day: 7    Assessment/Plan   Ambulatory dysfunction  Assessment & Plan  Seen by physical therapy and Occupational Therapy, recommendations for rehab.  Family meeting noted May 10 and all in agreement with blanket referrals.    Sinus pause  Assessment & Plan  Status post atropine with rapid response called for 10-second pauses post permacath placement during hospitalization.  Cardiology support appreciated, continue metoprolol    Scalp abscess  Assessment & Plan  Patient was noted to have abscess on admissions that was drained and patient is s/p keflex x 5 days.    Hypertensive emergency  Assessment & Plan  C/w lopressor and cardizem.        Essential hypertension  Assessment & Plan  Presented with chest pain and shortness of breath  No home medications reported  Meeting criteria for hypertensive emergency in ED with BP of 193/120  Nitroglycerin was administered in the ED, with improvement in BP  S/p Bumex drip    Plan:  Continue to monitor blood pressure  Controlled with metoprolol and Cardizem  Appreciate cardiology recommendations      Type 2 diabetes mellitus with hyperglycemia, with long-term current use of insulin (HCC)  Assessment & Plan  Home regimen of Lantus 20 units at lunch and at bedtime    Lab Results   Component Value Date    HGBA1C 7.8 (H) 05/03/2024     Recent Labs     05/10/24  1117 05/10/24  1612 05/10/24  2045 05/11/24  0713   POCGLU 149* 142* 150* 110       Blood Sugar Average: Last 72 hrs:  (P) 136.0083602395899651     Latest Reference Range & Units 05/10/24 07:20 05/10/24 11:17 05/10/24 16:12 05/10/24 20:45 05/11/24 07:13   POC Glucose 65 - 140 mg/dl 96 149 (H) 142 (H) 150 (H) 110     Controlled well on current regimen of Lantus and lispro    Severe obstructive sleep apnea  Assessment & Plan  Hx of BOBBY with CPAP  use at home  - patient endorse nightly use  Patient was started on BiPAP for respiratory distress in ED  Patient now off BiPAP    Plan:  Continue CPAP nightly    Acute renal failure superimposed on stage 3 chronic kidney disease (HCC)  Assessment & Plan  POA  Patient with history of stage IIIb CKD  Previously known baseline creatinine of 1.9-2     Recent Labs     05/09/24  0621 05/10/24  0550   CREATININE 7.36* 6.20*   EGFR 7 8       Estimated Creatinine Clearance: 13.3 mL/min (A) (by C-G formula based on SCr of 6.2 mg/dL (H)).    -Patient placed on Bumex drip 2mg/hr per nephrology with urine output of 5,175 over 24h by the morning of 5/6  -Sodium bicarb decreased to 325mg BID on 5/6, as acid-base balance improved  -Nephrology decreased Bumex drip to 1mg/hr on 5/6, with  over 24h by the morning of 5/7  -Urine output decreased from previous day, while on Bumex drip    Plan:  Patient discontinued off Bumex drip  Continue oral diuretics of torsemide per nephrology on nondialysis days  Continue dialysis Monday Wednesday Friday while inpatient case management notes a chair has been located for Tuesday Thursday Saturday on discharge  Physical therapy therapy recommended rehab    * Atrial flutter (HCC)  Assessment & Plan  Patient with new onset atrial flutter.  Continue anticoagulation with Eliquis, and rate control with Cardizem and metoprolol.    Appreciate cardiology support               VTE Pharmacologic Prophylaxis: VTE Score: 4 Moderate Risk (Score 3-4) - Pharmacological DVT Prophylaxis Ordered: apixaban (Eliquis).    Mobility:   Basic Mobility Inpatient Raw Score: 13  JH-HLM Goal: 4: Move to chair/commode  JH-HLM Achieved: 6: Walk 10 steps or more  JH-HLM Goal NOT achieved. Continue with multidisciplinary rounding and encourage appropriate mobility to improve upon JH-HLM goals.    Patient Centered Rounds: I performed bedside rounds with nursing staff today.   Discussions with Specialists or Other Care Team  Provider: Nephrology, cardiology notes reviewed    Education and Discussions with Family / Patient: Attempted to update  (daughter) via phone. Unable to contact.  Attempted to call contact at 9:30 AM    Total Time Spent on Date of Encounter in care of patient: 35 mins. This time was spent on one or more of the following: performing physical exam; counseling and coordination of care; obtaining or reviewing history; documenting in the medical record; reviewing/ordering tests, medications or procedures; communicating with other healthcare professionals and discussing with patient's family/caregivers.    Current Length of Stay: 7 day(s)  Current Patient Status: Inpatient   Certification Statement: The patient will continue to require additional inpatient hospital stay due to ambulatory dysfunction  Discharge Plan: Anticipate discharge in 24-48 hrs to rehab facility.    Code Status: Level 1 - Full Code    Subjective:   Patient seen and examined with [place] interpretation via [employer].  Patient reports an uneventful night.  He is sitting up in a chair watching television.  He denies any changes in his appetite.  He denies chest pain or shortness of breath.  He understands the plan for physical therapy placement as well as continued dialysis.    Objective:     Vitals:   Temp (24hrs), Av.1 °F (36.7 °C), Min:98 °F (36.7 °C), Max:98.2 °F (36.8 °C)    Temp:  [98 °F (36.7 °C)-98.2 °F (36.8 °C)] 98.2 °F (36.8 °C)  HR:  [71-72] 72  Resp:  [17-18] 17  BP: (133-155)/(79-92) 133/83  SpO2:  [95 %-99 %] 95 %  Body mass index is 37.51 kg/m².     Input and Output Summary (last 24 hours):   No intake or output data in the 24 hours ending 24 0832    Physical Exam:   Physical Exam  Vitals and nursing note reviewed.   Constitutional:       Appearance: Normal appearance. He is not ill-appearing or diaphoretic.   HENT:      Head: Normocephalic.      Nose: Nose normal. No congestion.      Mouth/Throat:      Mouth:  Mucous membranes are moist.   Eyes:      General: No scleral icterus.     Conjunctiva/sclera: Conjunctivae normal.   Cardiovascular:      Rate and Rhythm: Normal rate and regular rhythm.      Comments: HD catheter on right chest wall  Pulmonary:      Effort: Pulmonary effort is normal. No respiratory distress.   Abdominal:      General: Bowel sounds are normal. There is no distension.      Palpations: Abdomen is soft.   Musculoskeletal:      Cervical back: Normal range of motion and neck supple. No rigidity.   Skin:     General: Skin is warm.      Coloration: Skin is not jaundiced.   Neurological:      Mental Status: He is alert and oriented to person, place, and time.   Psychiatric:         Mood and Affect: Mood normal.         Behavior: Behavior normal.          Additional Data:     Labs:  Results from last 7 days   Lab Units 05/09/24  0621   WBC Thousand/uL 6.46   HEMOGLOBIN g/dL 9.6*   HEMATOCRIT % 30.4*   PLATELETS Thousands/uL 242   SEGS PCT % 60   LYMPHO PCT % 25   MONO PCT % 10   EOS PCT % 3     Results from last 7 days   Lab Units 05/10/24  0550 05/07/24  2209 05/07/24  1806   SODIUM mmol/L 137   < > 132*   POTASSIUM mmol/L 4.1   < > 7.3*   CHLORIDE mmol/L 102   < > 101   CO2 mmol/L 28   < > 19*   BUN mg/dL 38*   < > 80*   CREATININE mg/dL 6.20*   < > 9.02*   ANION GAP mmol/L 7   < > 12   CALCIUM mg/dL 8.0*   < > 8.7   ALBUMIN g/dL  --   --  3.2*   TOTAL BILIRUBIN mg/dL  --   --  0.29   ALK PHOS U/L  --   --  125*   ALT U/L  --   --  17   AST U/L  --   --  15   GLUCOSE RANDOM mg/dL 101   < > 133    < > = values in this interval not displayed.     Results from last 7 days   Lab Units 05/07/24  1658   INR  1.35*     Results from last 7 days   Lab Units 05/11/24  0713 05/10/24  2045 05/10/24  1612 05/10/24  1117 05/10/24  0720 05/09/24  1616 05/09/24  1103 05/09/24  0710 05/08/24  1557 05/08/24  1139 05/08/24  0733 05/07/24  2057   POC GLUCOSE mg/dl 110 150* 142* 149* 96 174* 134 150* 171* 132 96 193*          Results from last 7 days   Lab Units 05/07/24  1658   LACTIC ACID mmol/L 1.3       Lines/Drains:  Invasive Devices       Peripheral Intravenous Line  Duration             Peripheral IV 05/07/24 Right;Lower Arm 3 days              Hemodialysis Catheter  Duration             HD Permanent Double Catheter 3 days                      Telemetry:  Telemetry Orders (From admission, onward)               24 Hour Telemetry Monitoring  Continuous x 24 Hours (Telem)        Question:  Reason for 24 Hour Telemetry  Answer:  Arrhythmias requiring acute medical intervention / PPM or ICD malfunction                              Imaging: No pertinent imaging reviewed.    Recent Cultures (last 7 days):         Last 24 Hours Medication List:   Current Facility-Administered Medications   Medication Dose Route Frequency Provider Last Rate    acetaminophen  650 mg Oral Q6H PRN Ronna Hannahbekevin Moncadause, DO      apixaban  5 mg Oral BID Ronna Hurtado, DO      chlorhexidine  15 mL Mouth/Throat Q12H [facility] Ronna Moncadause, DO      insulin lispro  1-6 Units Subcutaneous TID AC Ronna Hurtado, DO      metoprolol tartrate  100 mg Oral Q12H [facility] Ronna Moncadause, DO      torsemide  40 mg Oral Once per day on Sunday Tuesday Thursday Saturday Garrison Gutierrez MD      trimethobenzamide  200 mg Intramuscular Q6H PRN Ronna Hurtado,           Today, Patient Was Seen By: Araceli Castorena MD    **Please Note: This note may have been constructed using a voice recognition system.**

## 2024-05-11 NOTE — ASSESSMENT & PLAN NOTE
Hx of BOBBY with CPAP use at home  - patient endorse nightly use  Patient was started on BiPAP for respiratory distress in ED  Patient now off BiPAP    Plan:  Continue CPAP nightly   Transferred to inpatient Rm 9 in stable condition. 166/99, HR 65, R-23, PO 94% 2LNC.       Phillip Cheng, RN  04/22/22 [id number]

## 2024-05-11 NOTE — PROGRESS NOTES
NEPHROLOGY PROGRESS NOTE   Jerod Worthington 63 y.o. male MRN: [id number]  Unit/Bed#: S -01 Encounter: [id number]  Reason for Consult: ARF      SUMMARY:     63 y.o. man with PMH of CKD G4 follow-up at [facility] but no labs in 2023, diabetic, obese, hypertension, BOBBY, obesity p/w SOB. Nephrology is consulted for CEE     ASSESSMENT and PLAN:    Acute renal failure currently dialysis dependent  -- Present on admission admission creatinine 7.25 mg/dL with worsening renal function and volume status resulting in him being started on hemodialysis due to urgent hyperkalemia with symptomatic bradycardia  -- Access: Right IJ permacath placed on May 7  -- He has underlying chronic kidney disease stage IV I suspect he will be deemed end-stage renal disease  -- Hemodialysis started on May 7  -- Currently on a TTS dialysis schedule.  -- Plan for dialysis today  -- Follow-up SPEP/UPEP  -- Outpatient HD placement as per case management.  -- Discussed with the primary team    Chronic kidney disease stage IV  -- Now with renal failure suspect will be deemed end-stage renal disease  -- No prior nephrologist  -- Suspect progression of disease in the last few years  -- Secondary to diabetic kidney disease, obesity related renal dysfunction and hypertension    Hyperkalemia--resolved    Mineral bone disorder-chronic kidney disease  -- Continue low phosphorus diet    Volume overload  -- Continue diuretics on on HD days and continue ultrafiltration with dialysis    Anemia of chronic kidney disease  -- IV iron with dialysis  -- Hemoglobin improving      SUBJECTIVE / INTERVAL HISTORY:    No overnight events    OBJECTIVE:  Current Weight: Weight - Scale: 99.1 kg (218 lb 8 oz)  Vitals:    05/10/24 2044 05/10/24 2232 05/11/24 0600 05/11/24 0715   BP: 139/90 133/79  133/83   Pulse: 71 72  72   Resp:  18  17   Temp:  98 °F (36.7 °C)  98.2 °F (36.8 °C)   TempSrc:       SpO2: 99% 96%  95%   Weight:   99.1 kg (218 lb 8 oz)    Height:         No  intake or output data in the 24 hours ending 05/11/24 0946    Review of Systems:    Constitutional: Negative for chills and fever.   HENT: Negative for ear pain and sore throat.    Eyes: Negative for pain and visual disturbance.   Respiratory: Negative for cough and shortness of breath.    Cardiovascular: Negative for chest pain and palpitations.   Gastrointestinal: Negative for abdominal pain and vomiting.   Genitourinary: Negative for dysuria and hematuria.   Musculoskeletal: Negative for arthralgias and back pain.   Skin: Negative for color change and rash.   Neurological: Negative for seizures and syncope.   12 point ROS has been reviewed.    Physical Exam  Vitals and nursing note reviewed.   Constitutional:       General: He is not in acute distress.     Appearance: He is well-developed. He is obese.   HENT:      Head: Normocephalic and atraumatic.   Eyes:      General: No scleral icterus.     Conjunctiva/sclera: Conjunctivae normal.      Pupils: Pupils are equal, round, and reactive to light.   Cardiovascular:      Rate and Rhythm: Normal rate and regular rhythm.      Heart sounds: S1 normal and S2 normal. No murmur heard.     No friction rub. No gallop.   Pulmonary:      Effort: Pulmonary effort is normal. No respiratory distress.      Breath sounds: Rales present. No wheezing.   Abdominal:      General: Bowel sounds are normal.      Palpations: Abdomen is soft.      Tenderness: There is no abdominal tenderness. There is no rebound.   Musculoskeletal:         General: Normal range of motion.      Cervical back: Normal range of motion and neck supple.      Right lower leg: Edema present.      Left lower leg: Edema present.   Skin:     Findings: No rash.   Neurological:      Mental Status: He is alert and oriented to person, place, and time.   Psychiatric:         Behavior: Behavior normal.         Medications:    Current Facility-Administered Medications:     acetaminophen (TYLENOL) tablet 650 mg, 650 mg,  Oral, Q6H PRN, Ronna Hurtado DO, 650 mg at 05/05/24 0753    apixaban (ELIQUIS) tablet 5 mg, 5 mg, Oral, BID, Ronna Hurtado DO, 5 mg at 05/11/24 0818    chlorhexidine (PERIDEX) 0.12 % oral rinse 15 mL, 15 mL, Mouth/Throat, Q12H BOBBI, Ronna Hurtado DO, 15 mL at 05/11/24 0818    insulin lispro (HumALOG/ADMELOG) 100 units/mL subcutaneous injection 1-6 Units, 1-6 Units, Subcutaneous, TID AC, 1 Units at 05/09/24 1753 **AND** Fingerstick Glucose (POCT), , , TID AC, Ronna Hurtado DO    metoprolol tartrate (LOPRESSOR) tablet 100 mg, 100 mg, Oral, Q12H BOBBI, Ronna Hurtado DO, 100 mg at 05/11/24 0818    torsemide (DEMADEX) tablet 40 mg, 40 mg, Oral, Once per day on Sunday Tuesday Thursday Saturday, Garrison Gutierrez MD, 40 mg at 05/11/24 0818    trimethobenzamide (TIGAN) IM injection 200 mg, 200 mg, Intramuscular, Q6H PRN, Ronna Hurtado DO    Laboratory Results:  Results from last 7 days   Lab Units 05/10/24  0550 05/09/24  0621 05/08/24  0524 05/07/24  2209 05/07/24  1806 05/07/24  1658 05/07/24  0453 05/06/24  1819 05/06/24  0556 05/05/24  0920   WBC Thousand/uL  --  6.46 8.10  --   --  8.36  --   --   --  6.00   HEMOGLOBIN g/dL  --  9.6* 9.0*  --   --  9.8*  --   --   --  10.4*   HEMATOCRIT %  --  30.4* 27.4*  --   --  30.2*  --   --   --  33.4*   PLATELETS Thousands/uL  --  242 245  --   --  263  --   --   --  259   POTASSIUM mmol/L 4.1 4.8 4.4 5.4* 7.3*  --  5.3 5.5* 4.7 5.4*   CHLORIDE mmol/L 102 102 101 101 101  --  105 106 107 109*   CO2 mmol/L 28 26 25 18* 19*  --  22 20* 23 20*   BUN mg/dL 38* 52* 54* 84* 80*  --  74* 71* 66* 61*   CREATININE mg/dL 6.20* 7.36* 7.49* 9.44* 9.02*  --  8.89* 8.25* 7.91* 7.64*   CALCIUM mg/dL 8.0* 8.4 8.4 8.5 8.7  --  8.1* 8.5 8.5 8.9   MAGNESIUM mg/dL  --  2.1 2.1  --  2.4  --  2.4 2.7 1.6*  --    PHOSPHORUS mg/dL  --   --  5.8*  --  5.9*  --   --   --   --  4.9*       PLEASE NOTE:  This encounter was completed utilizing the M-  Modal/Fluency Direct Speech Voice Recognition Software. Grammatical errors, random word insertions, pronoun errors and incomplete sentences are occasional consequences of the system due to software limitations, ambient noise and hardware issues.These may be missed by proof reading prior to affixing electronic signature. Any questions or concerns about the content, text or information contained within the body of this dictation should be directly addressed to the physician for clarification. Please do not hesitate to call me directly if you have any any questions or concerns.

## 2024-05-11 NOTE — HEMODIALYSIS
Net UF goal 2.5L over 4 hours as re via right TDC. Next HD treatment Tuesday, 5/14. See flowsheets for further assessment details.    Post-Dialysis RN Treatment Note    Blood Pressure: see flowsheets   Weight:  Pre 99.1 kg   Post 96.7 kg   Mode of weight measurement: Standing Scale   Volume Removed  2500 ml    Treatment duration 240 minutes    NS given  No    Treatment shortened? No   Medications given during Rx None Reported   Estimated Kt/V  1.16   Access type: Permacath/TDC   Access Issues: No    Report called to primary nurse   Yes, E.B.

## 2024-05-11 NOTE — ASSESSMENT & PLAN NOTE
Patient with new onset atrial flutter.  Continue anticoagulation with Eliquis, and rate control with Cardizem and metoprolol.    Appreciate cardiology support

## 2024-05-11 NOTE — ASSESSMENT & PLAN NOTE
Hospitalization significant for rapid response called for 10-second sinus pause post permacath placement.  Status post 1 mg of atropine.  I suspect secondary to increased vagal tone during emesis that could have triggered the sinus pause.  Cardiology aware, maintained safely on metoprolol

## 2024-05-12 LAB
GLUCOSE SERPL-MCNC: 115 MG/DL (ref 65–140)
GLUCOSE SERPL-MCNC: 133 MG/DL (ref 65–140)
GLUCOSE SERPL-MCNC: 134 MG/DL (ref 65–140)
GLUCOSE SERPL-MCNC: 206 MG/DL (ref 65–140)

## 2024-05-12 PROCEDURE — 97168 OT RE-EVAL EST PLAN CARE: CPT

## 2024-05-12 PROCEDURE — 99232 SBSQ HOSP IP/OBS MODERATE 35: CPT | Performed by: INTERNAL MEDICINE

## 2024-05-12 PROCEDURE — 82948 REAGENT STRIP/BLOOD GLUCOSE: CPT

## 2024-05-12 RX ORDER — POLYETHYLENE GLYCOL 3350 17 G/17G
17 POWDER, FOR SOLUTION ORAL DAILY
Status: DISCONTINUED | OUTPATIENT
Start: 2024-05-12 | End: 2024-05-15 | Stop reason: HOSPADM

## 2024-05-12 RX ORDER — SENNOSIDES 8.6 MG
2 TABLET ORAL DAILY
Status: DISCONTINUED | OUTPATIENT
Start: 2024-05-12 | End: 2024-05-15 | Stop reason: HOSPADM

## 2024-05-12 RX ADMIN — POLYETHYLENE GLYCOL 3350 17 G: 17 POWDER, FOR SOLUTION ORAL at 09:36

## 2024-05-12 RX ADMIN — APIXABAN 5 MG: 5 TABLET, FILM COATED ORAL at 18:21

## 2024-05-12 RX ADMIN — METOPROLOL TARTRATE 100 MG: 100 TABLET, FILM COATED ORAL at 22:04

## 2024-05-12 RX ADMIN — METOPROLOL TARTRATE 100 MG: 100 TABLET, FILM COATED ORAL at 08:05

## 2024-05-12 RX ADMIN — APIXABAN 5 MG: 5 TABLET, FILM COATED ORAL at 08:05

## 2024-05-12 RX ADMIN — CHLORHEXIDINE GLUCONATE 15 ML: 1.2 RINSE ORAL at 08:05

## 2024-05-12 RX ADMIN — SENNOSIDES 17.2 MG: 8.6 TABLET, FILM COATED ORAL at 09:36

## 2024-05-12 RX ADMIN — TORSEMIDE 40 MG: 20 TABLET ORAL at 08:05

## 2024-05-12 RX ADMIN — CHLORHEXIDINE GLUCONATE 15 ML: 1.2 RINSE ORAL at 22:01

## 2024-05-12 NOTE — PLAN OF CARE
Problem: OCCUPATIONAL THERAPY ADULT  Goal: Performs self-care activities at highest level of function for planned discharge setting.  See evaluation for individualized goals.  Description: Treatment Interventions: ADL retraining, Functional transfer training, Endurance training, Patient/family training, Equipment evaluation/education, Compensatory technique education, Energy conservation, Activityengagement          See flowsheet documentation for full assessment, interventions and recommendations.   Note: Limitation: Decreased ADL status, Decreased Safe judgement during ADL, Decreased cognition, Decreased endurance, Decreased self-care trans, Decreased high-level ADLs (impaired pain, balance, fxnl mobility, act re, standing re, and strength, pacing)  Prognosis: Good  Assessment: Pt is a 63 y.o. male admitted to [facility] on 5/3/2024 due to SOB. Pt seen on this date for OT Re-Evaluation due to new OT orders placed s/p RR called and transfer in/out of ICU. Please refer to H&P/ initial OT eval (5/6) for detailed PMH and social history. At baseline pt living with daughter in [place], pt (I) with ADLs and IADLs , (-)falls, (-)drives, no use of AD at baseline. Upon re-eval pt performed as is listed above, demonstrating regression in ADL performance and functional mobility, demonstrating decline in overall activity tolerance and endurance. Pt would benefit from continued skilled OT treatment in order to maximize safety, independence and overall performance with ADLs, functional transfers, functional mobility and cognition in order to achieve highest level of function. Updated goals are listed below     Rehab Resource Intensity Level, OT: III (Minimum Resource Intensity) (vs level II pending level of support family able to provide)

## 2024-05-12 NOTE — ASSESSMENT & PLAN NOTE
Status post atropine with rapid response called for 10-second pauses post permacath placement during hospitalization.  Cardiology support appreciated, continue metoprolol  No further reports of sinus pause

## 2024-05-12 NOTE — ASSESSMENT & PLAN NOTE
POA  Patient with history of stage IIIb CKD  Previously known baseline creatinine of 1.9-2     Recent Labs     05/10/24  0550 05/11/24  1022   CREATININE 6.20* 6.89*   EGFR 8 7       Estimated Creatinine Clearance: 12 mL/min (A) (by C-G formula based on SCr of 6.89 mg/dL (H)).    -Patient placed on Bumex drip 2mg/hr per nephrology with urine output of 5,175 over 24h by the morning of 5/6  -Sodium bicarb decreased to 325mg BID on 5/6, as acid-base balance improved  -Nephrology decreased Bumex drip to 1mg/hr on 5/6, with  over 24h by the morning of 5/7  -Urine output decreased from previous day, while on Bumex drip    Plan:  Urine output noted to be 3 L in the last 24 hours  Continue oral diuretics of torsemide per nephrology on nondialysis days  Continue dialysis Monday Wednesday Friday while inpatient case management notes a chair has been located for Tuesday Thursday Saturday on discharge  Physical therapy therapy recommended rehab

## 2024-05-12 NOTE — PROGRESS NOTES
[facility]  Progress Note  Name: Jerod Worthington I  MRN: [id number]  Unit/Bed#: S -01 I Date of Admission: 5/3/2024   Date of Service: 5/12/2024 I Hospital Day: 8    Assessment/Plan   Ambulatory dysfunction  Assessment & Plan  Seen by physical therapy and Occupational Therapy, recommendations for rehab.  Family meeting noted May 10 and all in agreement with blanket referrals.  Appreciate case management support    Sinus pause  Assessment & Plan  Status post atropine with rapid response called for 10-second pauses post permacath placement during hospitalization.  Cardiology support appreciated, continue metoprolol  No further reports of sinus pause    Scalp abscess  Assessment & Plan  Patient was noted to have abscess on admissions that was drained and patient is s/p keflex x 5 days.    Hypertensive emergency  Assessment & Plan  C/w lopressor and cardizem.  Now stable with systolic in the 140s        Essential hypertension  Assessment & Plan  Presented with chest pain and shortness of breath  No home medications reported  Meeting criteria for hypertensive emergency in ED with BP of 193/120  Nitroglycerin was administered in the ED, with improvement in BP  S/p Bumex drip now on torsemide    Plan:  Continue to monitor blood pressure  Controlled with metoprolol and Cardizem  Appreciate cardiology recommendations  Blood pressure stable      Type 2 diabetes mellitus with hyperglycemia, with long-term current use of insulin (HCC)  Assessment & Plan  Home regimen of Lantus 20 units at lunch and at bedtime    Lab Results   Component Value Date    HGBA1C 7.8 (H) 05/03/2024     Recent Labs     05/11/24  1112 05/11/24  1616 05/11/24  2107 05/12/24  0715   POCGLU 145* 128 186* 134       Blood Sugar Average: Last 72 hrs:  (P) 141.5     Latest Reference Range & Units 05/10/24 07:20 05/10/24 11:17 05/10/24 16:12 05/10/24 20:45 05/11/24 07:13   POC Glucose 65 - 140 mg/dl 96 149 (H) 142 (H) 150 (H) 110      Controlled well with current regimen of Lantus and lispro    Severe obstructive sleep apnea  Assessment & Plan  Hx of BOBBY with CPAP use at home  - patient endorse nightly use  Patient was started on BiPAP for respiratory distress in ED  Patient now off BiPAP    Plan:  Continue CPAP nightly    Acute renal failure superimposed on stage 3 chronic kidney disease (HCC)  Assessment & Plan  POA  Patient with history of stage IIIb CKD  Previously known baseline creatinine of 1.9-2     Recent Labs     05/10/24  0550 05/11/24  1022   CREATININE 6.20* 6.89*   EGFR 8 7       Estimated Creatinine Clearance: 12 mL/min (A) (by C-G formula based on SCr of 6.89 mg/dL (H)).    -Patient placed on Bumex drip 2mg/hr per nephrology with urine output of 5,175 over 24h by the morning of 5/6  -Sodium bicarb decreased to 325mg BID on 5/6, as acid-base balance improved  -Nephrology decreased Bumex drip to 1mg/hr on 5/6, with  over 24h by the morning of 5/7  -Urine output decreased from previous day, while on Bumex drip    Plan:  Urine output noted to be 3 L in the last 24 hours  Continue oral diuretics of torsemide per nephrology on nondialysis days  Continue dialysis Monday Wednesday Friday while inpatient case management notes a chair has been located for Tuesday Thursday Saturday on discharge  Physical therapy therapy recommended rehab    * Atrial flutter (HCC)  Assessment & Plan  Patient with new onset atrial flutter.  Continue anticoagulation with Eliquis, and rate control with Cardizem and metoprolol.    Appreciate cardiology support               VTE Pharmacologic Prophylaxis: VTE Score: 4 Moderate Risk (Score 3-4) - Pharmacological DVT Prophylaxis Ordered: apixaban (Eliquis).    Mobility:   Basic Mobility Inpatient Raw Score: 19  JH-HLM Goal: 6: Walk 10 steps or more  JH-HLM Achieved: 6: Walk 10 steps or more  JH-HLM Goal achieved. Continue to encourage appropriate mobility.    Patient Centered Rounds: I performed bedside  rounds with nursing staff today.   Discussions with Specialists or Other Care Team Provider: Nephrology    Education and Discussions with Family / Patient:  will call this afternoon.     Total Time Spent on Date of Encounter in care of patient: 35 mins. This time was spent on one or more of the following: performing physical exam; counseling and coordination of care; obtaining or reviewing history; documenting in the medical record; reviewing/ordering tests, medications or procedures; communicating with other healthcare professionals and discussing with patient's family/caregivers.    Current Length of Stay: 8 day(s)  Current Patient Status: Inpatient   Certification Statement: The patient will continue to require additional inpatient hospital stay due to dialysis, ambulatory dysfunction  Discharge Plan: Anticipate discharge in 24-48 hrs to rehab facility.    Code Status: Level 1 - Full Code    Subjective:   Patient is sitting up watching tv.  He notes he is constipated, and hasn't had a BM in almost one week. We discussed giving medication for this. He denies chest pain or shortness of breath.  HD scheduled for tomorrow    Objective:     Vitals:   Temp (24hrs), Av.5 °F (36.9 °C), Min:98.1 °F (36.7 °C), Max:98.9 °F (37.2 °C)    Temp:  [98.1 °F (36.7 °C)-98.9 °F (37.2 °C)] 98.9 °F (37.2 °C)  HR:  [71-78] 74  Resp:  [14-18] 15  BP: (141-173)/(76-96) 145/77  SpO2:  [97 %-98 %] 98 %  Body mass index is 37.77 kg/m².     Input and Output Summary (last 24 hours):     Intake/Output Summary (Last 24 hours) at 2024 0806  Last data filed at 2024 1700  Gross per 24 hour   Intake 700 ml   Output 3000 ml   Net -2300 ml       Physical Exam:   Physical Exam  Vitals and nursing note reviewed.   Constitutional:       Appearance: Normal appearance. He is not ill-appearing or diaphoretic.   HENT:      Head: Normocephalic.      Nose: Nose normal. No congestion.      Mouth/Throat:      Mouth: Mucous membranes are moist.       Pharynx: No oropharyngeal exudate.   Eyes:      General: No scleral icterus.     Conjunctiva/sclera: Conjunctivae normal.   Pulmonary:      Effort: Pulmonary effort is normal. No respiratory distress.      Comments: Chest wall HD catheter  Abdominal:      General: Bowel sounds are normal. There is no distension.      Palpations: Abdomen is soft.      Tenderness: There is no abdominal tenderness.   Musculoskeletal:      Cervical back: Normal range of motion and neck supple. No rigidity.   Skin:     General: Skin is warm.      Coloration: Skin is not jaundiced.   Neurological:      Mental Status: He is alert.          Additional Data:     Labs:  Results from last 7 days   Lab Units 05/11/24  1022 05/09/24  0621   WBC Thousand/uL 6.12 6.46   HEMOGLOBIN g/dL 9.3* 9.6*   HEMATOCRIT % 28.4* 30.4*   PLATELETS Thousands/uL 235 242   SEGS PCT %  --  60   LYMPHO PCT %  --  25   MONO PCT %  --  10   EOS PCT %  --  3     Results from last 7 days   Lab Units 05/11/24  1022 05/07/24  2209 05/07/24  1806   SODIUM mmol/L 136   < > 132*   POTASSIUM mmol/L 4.9   < > 7.3*   CHLORIDE mmol/L 102   < > 101   CO2 mmol/L 29   < > 19*   BUN mg/dL 47*   < > 80*   CREATININE mg/dL 6.89*   < > 9.02*   ANION GAP mmol/L 5   < > 12   CALCIUM mg/dL 7.9*   < > 8.7   ALBUMIN g/dL  --   --  3.2*   TOTAL BILIRUBIN mg/dL  --   --  0.29   ALK PHOS U/L  --   --  125*   ALT U/L  --   --  17   AST U/L  --   --  15   GLUCOSE RANDOM mg/dL 135   < > 133    < > = values in this interval not displayed.     Results from last 7 days   Lab Units 05/07/24  1658   INR  1.35*     Results from last 7 days   Lab Units 05/12/24  0715 05/11/24  2107 05/11/24  1616 05/11/24  1112 05/11/24  0713 05/10/24  2045 05/10/24  1612 05/10/24  1117 05/10/24  0720 05/09/24  1616 05/09/24  1103 05/09/24  0710   POC GLUCOSE mg/dl 134 186* 128 145* 110 150* 142* 149* 96 174* 134 150*         Results from last 7 days   Lab Units 05/07/24  1658   LACTIC ACID mmol/L 1.3        Lines/Drains:  Invasive Devices       Peripheral Intravenous Line  Duration             Peripheral IV 05/11/24 Left Forearm <1 day              Hemodialysis Catheter  Duration             HD Permanent Double Catheter 4 days                      Telemetry:  Telemetry Orders (From admission, onward)               24 Hour Telemetry Monitoring  Continuous x 24 Hours (Telem)        Question:  Reason for 24 Hour Telemetry  Answer:  Arrhythmias requiring acute medical intervention / PPM or ICD malfunction                                Imaging: No pertinent imaging reviewed.    Recent Cultures (last 7 days):         Last 24 Hours Medication List:   Current Facility-Administered Medications   Medication Dose Route Frequency Provider Last Rate    acetaminophen  650 mg Oral Q6H PRN Ronna Hurtado, DO      apixaban  5 mg Oral BID Ronna Hurtado, DO      chlorhexidine  15 mL Mouth/Throat Q12H [facility] Ronna Hurtado, DO      insulin lispro  1-6 Units Subcutaneous TID AC Ronna Hurtado, DO      metoprolol tartrate  100 mg Oral Q12H BOBBI Ronna Hurtado, DO      torsemide  40 mg Oral Once per day on Sunday Tuesday Thursday Saturday Garrison Gutierrez MD      trimethobenzamide  200 mg Intramuscular Q6H PRN Ronna Hurtado,           Today, Patient Was Seen By: Araceli Castorena MD    **Please Note: This note may have been constructed using a voice recognition system.**

## 2024-05-12 NOTE — OCCUPATIONAL THERAPY NOTE
"    Occupational Therapy Re-Evaluation     Patient Name: Jerod Worthington  Today's Date: 5/12/2024  Problem List  Principal Problem:    Atrial flutter (HCC)  Active Problems:    Acute renal failure superimposed on stage 3 chronic kidney disease (HCC)    Severe obstructive sleep apnea    Type 2 diabetes mellitus with hyperglycemia, with long-term current use of insulin (HCC)    Essential hypertension    Hypertensive emergency    Scalp abscess    Sinus pause    Ambulatory dysfunction    Past Medical History  History reviewed. No pertinent past medical history.  Past Surgical History  Past Surgical History:   Procedure Laterality Date    IR TUNNELED DIALYSIS CATHETER PLACEMENT  5/7/2024 05/12/24 0830   OT Last Visit   OT Visit Date 05/12/24   Note Type   Note type Re-Evaluation   Pain Assessment   Pain Assessment Tool 0-10   Pain Score 7   Pain Location/Orientation Orientation: Right;Location: Abdomen   Hospital Pain Intervention(s) Repositioned;Cold applied;Ambulation/increased activity   Restrictions/Precautions   Weight Bearing Precautions Per Order No   Other Precautions Chair Alarm;Bed Alarm;Multiple lines;Fall Risk;Pain   Home Living   Additional Comments see IE for home set up and PLOF   Lifestyle   Autonomy PTA pt living with daughter in 2SH, pt (I) with ADLs and IADLs , (-)falls, (-)drives, no use of AD at baseline   Reciprocal Relationships supportive daughter is able to assist as needed   Service to Others unemployed   Intrinsic Gratification enjoys spending time with his family and cooking   General   Additional Pertinent History Since previous evaluation pt with transfer to ICU due to RR for sinus pause   Family/Caregiver Present No   Subjective   Subjective \"I am okay\"   ADL   Eating Assistance 7  Independent   Grooming Assistance 7  Independent   UB Bathing Assistance 5  Supervision/Setup   LB Bathing Assistance 4  Minimal Assistance   UB Dressing Assistance 5  Supervision/Setup   LB Dressing " Assistance 4  Minimal Assistance   LB Dressing Deficit Increased time to complete;Supervision/safety;Verbal cueing;Thread RLE into pants;Thread LLE into pants;Pull up over hips   Toileting Assistance  5  Supervision/Setup   Bed Mobility   Supine to Sit 5  Supervision   Additional items Increased time required;Verbal cues;LE management   Transfers   Sit to Stand 4  Minimal assistance   Additional items Assist x 1;Increased time required;Verbal cues   Stand to Sit 4  Minimal assistance   Additional items Assist x 1;Increased time required;Verbal cues   Additional Comments no use of AD for transfers   Functional Mobility   Functional Mobility 4  Minimal assistance   Additional Comments Ax1, use of RW, functional household distance, with x2 LOB requiring mod A x1 for correction   Additional items Rolling walker   Balance   Static Sitting Fair +   Dynamic Sitting Fair -   Static Standing Poor +   Dynamic Standing Poor +   Ambulatory Poor +   Activity Tolerance   Activity Tolerance Patient limited by fatigue;Patient limited by pain   RUE Assessment   RUE Assessment WFL   LUE Assessment   LUE Assessment WFL   Hand Function   Gross Motor Coordination Functional   Fine Motor Coordination Functional   Vision-Basic Assessment   Current Vision Does not wear glasses   Cognition   Overall Cognitive Status WFL   Arousal/Participation Alert;Cooperative   Attention Attends with cues to redirect   Orientation Level Oriented X4   Memory Within functional limits   Following Commands Follows one step commands with increased time or repetition   Comments pleasant and cooperative, mainly [place] speaking, use of    Assessment   Limitation Decreased ADL status;Decreased Safe judgement during ADL;Decreased cognition;Decreased endurance;Decreased self-care trans;Decreased high-level ADLs  (impaired pain, balance, fxnl mobility, act re, standing re, and strength, pacing)   Prognosis Good   Assessment Pt is a 63 y.o. male admitted  to [facility] on 5/3/2024 due to SOB. Pt seen on this date for OT Re-Evaluation due to new OT orders placed s/p RR called and transfer in/out of ICU. Please refer to H&P/ initial OT eval (5/6) for detailed PMH and social history. At baseline pt living with daughter in 2SH, pt (I) with ADLs and IADLs , (-)falls, (-)drives, no use of AD at baseline. Upon re-eval pt performed as is listed above, demonstrating regression in ADL performance and functional mobility, demonstrating decline in overall activity tolerance and endurance. Pt would benefit from continued skilled OT treatment in order to maximize safety, independence and overall performance with ADLs, functional transfers, functional mobility and cognition in order to achieve highest level of function. Updated goals are listed below   Goals   Patient Goals none stated at this time, will cont to assess   LTG Time Frame 10-14   Long Term Goal see goals listed below   Plan   Treatment Interventions ADL retraining;Functional transfer training;Endurance training;Patient/family training;Equipment evaluation/education;Compensatory technique education;Energy conservation;Activityengagement   Goal Expiration Date 05/22/24   OT Treatment Day 0   OT Frequency 3-5x/wk   Discharge Recommendation   Rehab Resource Intensity Level, OT III (Minimum Resource Intensity)  (vs level II pending level of support family able to provide)   AM-PAC Daily Activity Inpatient   Lower Body Dressing 3   Bathing 3   Toileting 3   Upper Body Dressing 3   Grooming 4   Eating 4   Daily Activity Raw Score 20   Daily Activity Standardized Score (Calc for Raw Score >=11) 42.03   AM-PAC Applied Cognition Inpatient   Following a Speech/Presentation 4   Understanding Ordinary Conversation 4   Taking Medications 4   Remembering Where Things Are Placed or Put Away 4   Remembering List of 4-5 Errands 4   Taking Care of Complicated Tasks 4   Applied Cognition Raw Score 24   Applied Cognition Standardized Score  62.21   End of Consult   Patient Position at End of Consult Bedside chair;Bed/Chair alarm activated;All needs within reach     GOALS:      -Patient will be Mod I with UB dressing using AE and AD as needed in order to increase (I) with ADLs    -Patient will be Mod I with UB bathing using AE and AD as needed in order to increase (I) with ADLs    -Patient will be Mod I with LB dressing with use of AE and AD as needed in order to increase (I) with ADLs    -Patient will be Mod I with LB bathing with use of AE and AD as needed in order to increase (I) with ADLs    -Patient will complete toileting w/ Mod I w/ G hygiene/thoroughness in order to reduce caregiver burden    -Patient will demonstrate Mod I with bed mobility for ability to manage own comfort and initiate OOB tasks.     -Patient will perform functional transfers with Mod I to/from all surfaces using DME as needed in order to increase (I) with functional tasks    -Patient will be Mod I with functional mobility to/from bathroom for increased independence with toileting tasks    -Patient will tolerate therapeutic activities for greater than 30 min, in order to increase tolerance for functional activities.       The patient's raw score on the -PAC Daily Activity Inpatient Short Form is 20. A raw score of greater than or equal to 19 suggests the patient may benefit from discharge to home. Please refer to the recommendation of the Occupational Therapist for safe discharge planning.      Chantale Salas MS, OTR/L

## 2024-05-12 NOTE — ASSESSMENT & PLAN NOTE
Presented with chest pain and shortness of breath  No home medications reported  Meeting criteria for hypertensive emergency in ED with BP of 193/120  Nitroglycerin was administered in the ED, with improvement in BP  S/p Bumex drip now on torsemide    Plan:  Continue to monitor blood pressure  Controlled with metoprolol and Cardizem  Appreciate cardiology recommendations  Blood pressure stable

## 2024-05-12 NOTE — PLAN OF CARE
Problem: PAIN - ADULT  Goal: Verbalizes/displays adequate comfort level or baseline comfort level  Description: Interventions:  - Encourage patient to monitor pain and request assistance  - Assess pain using appropriate pain scale  - Administer analgesics based on type and severity of pain and evaluate response  - Implement non-pharmacological measures as appropriate and evaluate response  - Consider cultural and social influences on pain and pain management  - Notify physician/advanced practitioner if interventions unsuccessful or patient reports new pain  Outcome: Progressing     Problem: INFECTION - ADULT  Goal: Absence or prevention of progression during hospitalization  Description: INTERVENTIONS:  - Assess and monitor for signs and symptoms of infection  - Monitor lab/diagnostic results  - Monitor all insertion sites, i.e. indwelling lines, tubes, and drains  - Monitor endotracheal if appropriate and nasal secretions for changes in amount and color  - [place] appropriate cooling/warming therapies per order  - Administer medications as ordered  - Instruct and encourage patient and family to use good hand hygiene technique  - Identify and instruct in appropriate isolation precautions for identified infection/condition  Outcome: Progressing  Goal: Absence of fever/infection during neutropenic period  Description: INTERVENTIONS:  - Monitor WBC    Outcome: Progressing     Problem: SAFETY ADULT  Goal: Patient will remain free of falls  Description: INTERVENTIONS:  - Educate patient/family on patient safety including physical limitations  - Instruct patient to call for assistance with activity   - Consult OT/PT to assist with strengthening/mobility   - Keep Call bell within reach  - Keep bed low and locked with side rails adjusted as appropriate  - Keep care items and personal belongings within reach  - Initiate and maintain comfort rounds  - Make Fall Risk Sign visible to staff  - Offer Toileting every 2 Hours,  in advance of need  - Initiate/Maintain bed alarm  - Obtain necessary fall risk management equipment  - Apply yellow socks and bracelet for high fall risk patients  - Consider moving patient to room near nurses station  Outcome: Progressing  Goal: Maintain or return to baseline ADL function  Description: INTERVENTIONS:  -  Assess patient's ability to carry out ADLs; assess patient's baseline for ADL function and identify physical deficits which impact ability to perform ADLs (bathing, care of mouth/teeth, toileting, grooming, dressing, etc.)  - Assess/evaluate cause of self-care deficits   - Assess range of motion  - Assess patient's mobility; develop plan if impaired  - Assess patient's need for assistive devices and provide as appropriate  - Encourage maximum independence but intervene and supervise when necessary  - Involve family in performance of ADLs  - Assess for home care needs following discharge   - Consider OT consult to assist with ADL evaluation and planning for discharge  - Provide patient education as appropriate  Outcome: Progressing  Goal: Maintains/Returns to pre admission functional level  Description: INTERVENTIONS:  - Perform AM-PAC 6 Click Basic Mobility/ Daily Activity assessment daily.  - Set and communicate daily mobility goal to care team and patient/family/caregiver.   - Collaborate with rehabilitation services on mobility goals if consulted  - Perform Range of Motion 3 times a day.  - Reposition patient every 2 hours.  - Dangle patient 3 times a day  - Stand patient 3 times a day  - Ambulate patient 3 times a day  - Out of bed to chair 3 times a day   - Out of bed for meals 3 times a day  - Out of bed for toileting  - Record patient progress and toleration of activity level   Outcome: Progressing     Problem: DISCHARGE PLANNING  Goal: Discharge to home or other facility with appropriate resources  Description: INTERVENTIONS:  - Identify barriers to discharge w/patient and caregiver  -  Arrange for needed discharge resources and transportation as appropriate  - Identify discharge learning needs (meds, wound care, etc.)  - Arrange for interpretive services to assist at discharge as needed  - Refer to Case Management Department for coordinating discharge planning if the patient needs post-hospital services based on physician/advanced practitioner order or complex needs related to functional status, cognitive ability, or social support system  Outcome: Progressing     Problem: Knowledge Deficit  Goal: Patient/family/caregiver demonstrates understanding of disease process, treatment plan, medications, and discharge instructions  Description: Complete learning assessment and assess knowledge base.  Interventions:  - Provide teaching at level of understanding  - Provide teaching via preferred learning methods  Outcome: Progressing     Problem: METABOLIC, FLUID AND ELECTROLYTES - ADULT  Goal: Electrolytes maintained within normal limits  Description: INTERVENTIONS:  - Monitor labs and assess patient for signs and symptoms of electrolyte imbalances  - Administer electrolyte replacement as ordered  - Monitor response to electrolyte replacements, including repeat lab results as appropriate  - Instruct patient on fluid and nutrition as appropriate  Outcome: Progressing  Goal: Fluid balance maintained  Description: INTERVENTIONS:  - Monitor labs   - Monitor I/O and WT  - Instruct patient on fluid and nutrition as appropriate  - Assess for signs & symptoms of volume excess or deficit  Outcome: Progressing     Problem: SAFETY,RESTRAINT: NV/NON-SELF DESTRUCTIVE BEHAVIOR  Goal: Remains free of harm/injury (restraint for non violent/non self-detsructive behavior)  Description: INTERVENTIONS:  - Instruct patient/family regarding restraint use   - Assess and monitor physiologic and psychological status   - Provide interventions and comfort measures to meet assessed patient needs   - Identify and implement measures to  help patient regain control  - Assess readiness for release of restraint   Outcome: Progressing  Goal: Returns to optimal restraint-free functioning  Description: INTERVENTIONS:  - Assess the patient's behavior and symptoms that indicate continued need for restraint  - Identify and implement measures to help patient regain control  - Assess readiness for release of restraint   Outcome: Progressing     Problem: Prexisting or High Potential for Compromised Skin Integrity  Goal: Skin integrity is maintained or improved  Description: INTERVENTIONS:  - Identify patients at risk for skin breakdown  - Assess and monitor skin integrity  - Assess and monitor nutrition and hydration status  - Monitor labs   - Assess for incontinence   - Turn and reposition patient  - Assist with mobility/ambulation  - Relieve pressure over bony prominences  - Avoid friction and shearing  - Provide appropriate hygiene as needed including keeping skin clean and dry  - Evaluate need for skin moisturizer/barrier cream  - Collaborate with interdisciplinary team   - Patient/family teaching  - Consider wound care consult   Outcome: Progressing     Problem: Nutrition/Hydration-ADULT  Goal: Nutrient/Hydration intake appropriate for improving, restoring or maintaining nutritional needs  Description: Monitor and assess patient's nutrition/hydration status for malnutrition. Collaborate with interdisciplinary team and initiate plan and interventions as ordered.  Monitor patient's weight and dietary intake as ordered or per policy. Utilize nutrition screening tool and intervene as necessary. Determine patient's food preferences and provide high-protein, high-caloric foods as appropriate.     INTERVENTIONS:  - Monitor oral intake, urinary output, labs, and treatment plans  - Assess nutrition and hydration status and recommend course of action  - Evaluate amount of meals eaten  - Assist patient with eating if necessary   - Allow adequate time for meals  -  Recommend/ encourage appropriate diets, oral nutritional supplements, and vitamin/mineral supplements  - Order, calculate, and assess calorie counts as needed  - Recommend, monitor, and adjust tube feedings and TPN/PPN based on assessed needs  - Assess need for intravenous fluids  - Provide specific nutrition/hydration education as appropriate  - Include patient/family/caregiver in decisions related to nutrition  Outcome: Progressing

## 2024-05-12 NOTE — ASSESSMENT & PLAN NOTE
Home regimen of Lantus 20 units at lunch and at bedtime    Lab Results   Component Value Date    HGBA1C 7.8 (H) 05/03/2024     Recent Labs     05/11/24  1112 05/11/24  1616 05/11/24  2107 05/12/24  0715   POCGLU 145* 128 186* 134       Blood Sugar Average: Last 72 hrs:  (P) 141.5     Latest Reference Range & Units 05/10/24 07:20 05/10/24 11:17 05/10/24 16:12 05/10/24 20:45 05/11/24 07:13   POC Glucose 65 - 140 mg/dl 96 149 (H) 142 (H) 150 (H) 110     Controlled well with current regimen of Lantus and lispro

## 2024-05-12 NOTE — ASSESSMENT & PLAN NOTE
Seen by physical therapy and Occupational Therapy, recommendations for rehab.  Family meeting noted May 10 and all in agreement with blanket referrals.  Appreciate case management support

## 2024-05-13 ENCOUNTER — APPOINTMENT (INPATIENT)
Dept: ULTRASOUND IMAGING | Facility: HOSPITAL | Age: 64
DRG: 469 | End: 2024-05-13
Payer: MEDICARE

## 2024-05-13 LAB
ANION GAP SERPL CALCULATED.3IONS-SCNC: 8 MMOL/L (ref 4–13)
BACTERIA UR QL AUTO: ABNORMAL /HPF
BASOPHILS # BLD AUTO: 0.03 THOUSANDS/ÂΜL (ref 0–0.1)
BASOPHILS NFR BLD AUTO: 1 % (ref 0–1)
BILIRUB UR QL STRIP: NEGATIVE
BUN SERPL-MCNC: 42 MG/DL (ref 5–25)
CALCIUM SERPL-MCNC: 8.1 MG/DL (ref 8.4–10.2)
CHLORIDE SERPL-SCNC: 102 MMOL/L (ref 96–108)
CLARITY UR: CLEAR
CO2 SERPL-SCNC: 29 MMOL/L (ref 21–32)
COLOR UR: ABNORMAL
CREAT SERPL-MCNC: 6.11 MG/DL (ref 0.6–1.3)
EOSINOPHIL # BLD AUTO: 0.17 THOUSAND/ÂΜL (ref 0–0.61)
EOSINOPHIL NFR BLD AUTO: 3 % (ref 0–6)
ERYTHROCYTE [DISTWIDTH] IN BLOOD BY AUTOMATED COUNT: 13.4 % (ref 11.6–15.1)
GFR SERPL CREATININE-BSD FRML MDRD: 9 ML/MIN/1.73SQ M
GLUCOSE SERPL-MCNC: 129 MG/DL (ref 65–140)
GLUCOSE SERPL-MCNC: 136 MG/DL (ref 65–140)
GLUCOSE SERPL-MCNC: 151 MG/DL (ref 65–140)
GLUCOSE SERPL-MCNC: 182 MG/DL (ref 65–140)
GLUCOSE UR STRIP-MCNC: ABNORMAL MG/DL
HCT VFR BLD AUTO: 28.4 % (ref 36.5–49.3)
HGB BLD-MCNC: 9.3 G/DL (ref 12–17)
HGB UR QL STRIP.AUTO: ABNORMAL
HYALINE CASTS #/AREA URNS LPF: ABNORMAL /LPF
IMM GRANULOCYTES # BLD AUTO: 0.02 THOUSAND/UL (ref 0–0.2)
IMM GRANULOCYTES NFR BLD AUTO: 0 % (ref 0–2)
KETONES UR STRIP-MCNC: NEGATIVE MG/DL
LEUKOCYTE ESTERASE UR QL STRIP: NEGATIVE
LYMPHOCYTES # BLD AUTO: 1.67 THOUSANDS/ÂΜL (ref 0.6–4.47)
LYMPHOCYTES NFR BLD AUTO: 27 % (ref 14–44)
MCH RBC QN AUTO: 30.9 PG (ref 26.8–34.3)
MCHC RBC AUTO-ENTMCNC: 32.7 G/DL (ref 31.4–37.4)
MCV RBC AUTO: 94 FL (ref 82–98)
MONOCYTES # BLD AUTO: 0.58 THOUSAND/ÂΜL (ref 0.17–1.22)
MONOCYTES NFR BLD AUTO: 9 % (ref 4–12)
NEUTROPHILS # BLD AUTO: 3.81 THOUSANDS/ÂΜL (ref 1.85–7.62)
NEUTS SEG NFR BLD AUTO: 60 % (ref 43–75)
NITRITE UR QL STRIP: NEGATIVE
NON-SQ EPI CELLS URNS QL MICRO: ABNORMAL /HPF
NRBC BLD AUTO-RTO: 0 /100 WBCS
PH UR STRIP.AUTO: 6.5 [PH]
PLATELET # BLD AUTO: 221 THOUSANDS/UL (ref 149–390)
PMV BLD AUTO: 9.1 FL (ref 8.9–12.7)
POTASSIUM SERPL-SCNC: 3.8 MMOL/L (ref 3.5–5.3)
PROT UR STRIP-MCNC: ABNORMAL MG/DL
RBC # BLD AUTO: 3.01 MILLION/UL (ref 3.88–5.62)
RBC #/AREA URNS AUTO: ABNORMAL /HPF
SODIUM SERPL-SCNC: 139 MMOL/L (ref 135–147)
SP GR UR STRIP.AUTO: 1.01 (ref 1–1.03)
UROBILINOGEN UR STRIP-ACNC: <2 MG/DL
WBC # BLD AUTO: 6.28 THOUSAND/UL (ref 4.31–10.16)
WBC #/AREA URNS AUTO: ABNORMAL /HPF

## 2024-05-13 PROCEDURE — 76775 US EXAM ABDO BACK WALL LIM: CPT

## 2024-05-13 PROCEDURE — NC001 PR NO CHARGE: Performed by: INTERNAL MEDICINE

## 2024-05-13 PROCEDURE — 80048 BASIC METABOLIC PNL TOTAL CA: CPT | Performed by: INTERNAL MEDICINE

## 2024-05-13 PROCEDURE — 85025 COMPLETE CBC W/AUTO DIFF WBC: CPT | Performed by: INTERNAL MEDICINE

## 2024-05-13 PROCEDURE — 99232 SBSQ HOSP IP/OBS MODERATE 35: CPT | Performed by: INTERNAL MEDICINE

## 2024-05-13 PROCEDURE — 81001 URINALYSIS AUTO W/SCOPE: CPT | Performed by: INTERNAL MEDICINE

## 2024-05-13 PROCEDURE — 97116 GAIT TRAINING THERAPY: CPT

## 2024-05-13 PROCEDURE — 82948 REAGENT STRIP/BLOOD GLUCOSE: CPT

## 2024-05-13 RX ORDER — TORSEMIDE 20 MG/1
40 TABLET ORAL EVERY OTHER DAY
Status: DISCONTINUED | OUTPATIENT
Start: 2024-05-14 | End: 2024-05-15 | Stop reason: HOSPADM

## 2024-05-13 RX ADMIN — CHLORHEXIDINE GLUCONATE 15 ML: 1.2 RINSE ORAL at 09:09

## 2024-05-13 RX ADMIN — APIXABAN 5 MG: 5 TABLET, FILM COATED ORAL at 18:05

## 2024-05-13 RX ADMIN — INSULIN LISPRO 1 UNITS: 100 INJECTION, SOLUTION INTRAVENOUS; SUBCUTANEOUS at 11:21

## 2024-05-13 RX ADMIN — CHLORHEXIDINE GLUCONATE 15 ML: 1.2 RINSE ORAL at 20:39

## 2024-05-13 RX ADMIN — APIXABAN 5 MG: 5 TABLET, FILM COATED ORAL at 09:08

## 2024-05-13 RX ADMIN — SENNOSIDES 17.2 MG: 8.6 TABLET, FILM COATED ORAL at 09:08

## 2024-05-13 RX ADMIN — METOPROLOL TARTRATE 100 MG: 100 TABLET, FILM COATED ORAL at 20:40

## 2024-05-13 RX ADMIN — METOPROLOL TARTRATE 100 MG: 100 TABLET, FILM COATED ORAL at 09:08

## 2024-05-13 RX ADMIN — POLYETHYLENE GLYCOL 3350 17 G: 17 POWDER, FOR SOLUTION ORAL at 09:08

## 2024-05-13 RX ADMIN — INSULIN LISPRO 1 UNITS: 100 INJECTION, SOLUTION INTRAVENOUS; SUBCUTANEOUS at 16:24

## 2024-05-13 NOTE — PLAN OF CARE
Problem: PAIN - ADULT  Goal: Verbalizes/displays adequate comfort level or baseline comfort level  Description: Interventions:  - Encourage patient to monitor pain and request assistance  - Assess pain using appropriate pain scale  - Administer analgesics based on type and severity of pain and evaluate response  - Implement non-pharmacological measures as appropriate and evaluate response  - Consider cultural and social influences on pain and pain management  - Notify physician/advanced practitioner if interventions unsuccessful or patient reports new pain  Outcome: Progressing     Problem: INFECTION - ADULT  Goal: Absence or prevention of progression during hospitalization  Description: INTERVENTIONS:  - Assess and monitor for signs and symptoms of infection  - Monitor lab/diagnostic results  - Monitor all insertion sites, i.e. indwelling lines, tubes, and drains  - Monitor endotracheal if appropriate and nasal secretions for changes in amount and color  - [place] appropriate cooling/warming therapies per order  - Administer medications as ordered  - Instruct and encourage patient and family to use good hand hygiene technique  - Identify and instruct in appropriate isolation precautions for identified infection/condition  Outcome: Progressing  Goal: Absence of fever/infection during neutropenic period  Description: INTERVENTIONS:  - Monitor WBC    Outcome: Progressing     Problem: SAFETY ADULT  Goal: Patient will remain free of falls  Description: INTERVENTIONS:  - Educate patient/family on patient safety including physical limitations  - Instruct patient to call for assistance with activity   - Consult OT/PT to assist with strengthening/mobility   - Keep Call bell within reach  - Keep bed low and locked with side rails adjusted as appropriate  - Keep care items and personal belongings within reach  - Initiate and maintain comfort rounds  - Make Fall Risk Sign visible to staff  - Offer Toileting every 2 Hours,  in advance of need  - Initiate/Maintain bed alarm  - Obtain necessary fall risk management equipment  - Apply yellow socks and bracelet for high fall risk patients  - Consider moving patient to room near nurses station  Outcome: Progressing  Goal: Maintain or return to baseline ADL function  Description: INTERVENTIONS:  -  Assess patient's ability to carry out ADLs; assess patient's baseline for ADL function and identify physical deficits which impact ability to perform ADLs (bathing, care of mouth/teeth, toileting, grooming, dressing, etc.)  - Assess/evaluate cause of self-care deficits   - Assess range of motion  - Assess patient's mobility; develop plan if impaired  - Assess patient's need for assistive devices and provide as appropriate  - Encourage maximum independence but intervene and supervise when necessary  - Involve family in performance of ADLs  - Assess for home care needs following discharge   - Consider OT consult to assist with ADL evaluation and planning for discharge  - Provide patient education as appropriate  Outcome: Progressing  Goal: Maintains/Returns to pre admission functional level  Description: INTERVENTIONS:  - Perform AM-PAC 6 Click Basic Mobility/ Daily Activity assessment daily.  - Set and communicate daily mobility goal to care team and patient/family/caregiver.   - Collaborate with rehabilitation services on mobility goals if consulted  - Perform Range of Motion 3 times a day.  - Reposition patient every 2 hours.  - Dangle patient 3 times a day  - Stand patient 3 times a day  - Ambulate patient 3 times a day  - Out of bed to chair 3 times a day   - Out of bed for meals 3 times a day  - Out of bed for toileting  - Record patient progress and toleration of activity level   Outcome: Progressing     Problem: DISCHARGE PLANNING  Goal: Discharge to home or other facility with appropriate resources  Description: INTERVENTIONS:  - Identify barriers to discharge w/patient and caregiver  -  Arrange for needed discharge resources and transportation as appropriate  - Identify discharge learning needs (meds, wound care, etc.)  - Arrange for interpretive services to assist at discharge as needed  - Refer to Case Management Department for coordinating discharge planning if the patient needs post-hospital services based on physician/advanced practitioner order or complex needs related to functional status, cognitive ability, or social support system  Outcome: Progressing     Problem: Knowledge Deficit  Goal: Patient/family/caregiver demonstrates understanding of disease process, treatment plan, medications, and discharge instructions  Description: Complete learning assessment and assess knowledge base.  Interventions:  - Provide teaching at level of understanding  - Provide teaching via preferred learning methods  Outcome: Progressing     Problem: METABOLIC, FLUID AND ELECTROLYTES - ADULT  Goal: Electrolytes maintained within normal limits  Description: INTERVENTIONS:  - Monitor labs and assess patient for signs and symptoms of electrolyte imbalances  - Administer electrolyte replacement as ordered  - Monitor response to electrolyte replacements, including repeat lab results as appropriate  - Instruct patient on fluid and nutrition as appropriate  Outcome: Progressing  Goal: Fluid balance maintained  Description: INTERVENTIONS:  - Monitor labs   - Monitor I/O and WT  - Instruct patient on fluid and nutrition as appropriate  - Assess for signs & symptoms of volume excess or deficit  Outcome: Progressing     Problem: SAFETY,RESTRAINT: NV/NON-SELF DESTRUCTIVE BEHAVIOR  Goal: Remains free of harm/injury (restraint for non violent/non self-detsructive behavior)  Description: INTERVENTIONS:  - Instruct patient/family regarding restraint use   - Assess and monitor physiologic and psychological status   - Provide interventions and comfort measures to meet assessed patient needs   - Identify and implement measures to  help patient regain control  - Assess readiness for release of restraint   Outcome: Progressing  Goal: Returns to optimal restraint-free functioning  Description: INTERVENTIONS:  - Assess the patient's behavior and symptoms that indicate continued need for restraint  - Identify and implement measures to help patient regain control  - Assess readiness for release of restraint   Outcome: Progressing     Problem: Prexisting or High Potential for Compromised Skin Integrity  Goal: Skin integrity is maintained or improved  Description: INTERVENTIONS:  - Identify patients at risk for skin breakdown  - Assess and monitor skin integrity  - Assess and monitor nutrition and hydration status  - Monitor labs   - Assess for incontinence   - Turn and reposition patient  - Assist with mobility/ambulation  - Relieve pressure over bony prominences  - Avoid friction and shearing  - Provide appropriate hygiene as needed including keeping skin clean and dry  - Evaluate need for skin moisturizer/barrier cream  - Collaborate with interdisciplinary team   - Patient/family teaching  - Consider wound care consult   Outcome: Progressing     Problem: Nutrition/Hydration-ADULT  Goal: Nutrient/Hydration intake appropriate for improving, restoring or maintaining nutritional needs  Description: Monitor and assess patient's nutrition/hydration status for malnutrition. Collaborate with interdisciplinary team and initiate plan and interventions as ordered.  Monitor patient's weight and dietary intake as ordered or per policy. Utilize nutrition screening tool and intervene as necessary. Determine patient's food preferences and provide high-protein, high-caloric foods as appropriate.     INTERVENTIONS:  - Monitor oral intake, urinary output, labs, and treatment plans  - Assess nutrition and hydration status and recommend course of action  - Evaluate amount of meals eaten  - Assist patient with eating if necessary   - Allow adequate time for meals  -  Recommend/ encourage appropriate diets, oral nutritional supplements, and vitamin/mineral supplements  - Order, calculate, and assess calorie counts as needed  - Recommend, monitor, and adjust tube feedings and TPN/PPN based on assessed needs  - Assess need for intravenous fluids  - Provide specific nutrition/hydration education as appropriate  - Include patient/family/caregiver in decisions related to nutrition  Outcome: Progressing

## 2024-05-13 NOTE — PROGRESS NOTES
General Cardiology   Progress Note -  Team One   Jerod Worthington 63 y.o. male MRN: [id number]    Unit/Bed#: S -01 Encounter: [id number]    Assessment  1. Newly diagnosed atrial flutter, unclear chronicity.  -ECG on admission demonstrated atrial flutter, RBBB (chronic) rate 140 bpm  -Repeat ECG 5/5; demonstrated atrial flutter with variable AVB, HR 88 bpm. RBBB (chronic)  -Telemetry review - atrial flutter, rate controlled  -Outpatient rate/rhythm control regimen; none.  -Inpatient rate/rhythm control; metoprolol tartrate 100 mg twice daily   -Started on apixaban 2.5 mg twice daily on 5/4, increased to 5 mg BID on 5/6.  2. Sinus pauses (occurred on 5/7)  -Rapid response was called on the evening of 5/7 following placement of his HD cath.  Pt was reported to have had episodes of sinus pauses.  He did receive IV atropine.  Of note he was found to be severely hyperkalemic with a K+ level of 7.3.  He underwent urgent dialysis treatment.  Oral Cardizem discontinued.  No further recurrence.  3. Volume overload - likely stemming from severe CEE.  -Improving  -BNP level 220  -Chest x-ray 5/3-severe pulmonary edema with small bilateral pleural effusions.  -TTE 5/4/2024; LVEF 60%, wall motion normal, RV normal size/systolic function, moderate MR.  -Outpatient diuretic regimen; none  -Inpatient volume management; HD + torsemide 40 mg QOD (non dialysis days)  -24-hour I&O balance; -845 mL, overall -10.2 L  4. CEE superimposed on CKD stage IV.  Nephrology following.  Currently dialysis dependent.  -Baseline creatinine around 1.89  -Creatinine level 7.25 on admission, currently 6.11  5. Hypertension  -Average 139/78 last recorded 141/78  -Outpatient BP regimen; none  -Inpatient BP regimen; metoprolol tartrate 100 mg twice daily  6. DM type II  -HgbA1c level 7.8  7. BOBBY - on CPAP at HS.     Plan  -Pt denies any specific cardiac or respiratory complaints this a.m.No current supplemental O2 requirements, sats stable on RA.  -Volume  status gradually improving.  Volume management now via HD + utilization of torsemide 40 mg QOD on non dialysis days.  -Remains in atrial flutter, rates are currently well controlled. Continue metoprolol tartrate 100 mg BID   -Continue apixiban 5 mg twice daily  -Strict I's and O's, daily weights, 2 g Na+ diet 2 LFR.  -Monitor renal function and electrolytes closely.  Replete to maintain K+ level 4.0 magnesium level 2.0.  -Can DC telemetry     Subjective  Review of Systems   Constitutional: Positive for malaise/fatigue. Negative for chills and fever.   Eyes:  Negative for visual disturbance.   Cardiovascular:  Negative for chest pain, dyspnea on exertion, leg swelling, orthopnea and palpitations.   Respiratory:  Negative for cough and shortness of breath.    Gastrointestinal:  Negative for abdominal pain.   Neurological:  Negative for dizziness, headaches and light-headedness.       Objective:   Physical Exam  Vitals and nursing note reviewed.   Constitutional:       General: He is not in acute distress.     Appearance: He is obese. He is not diaphoretic.   HENT:      Head: Normocephalic and atraumatic.   Eyes:      General: No scleral icterus.  Cardiovascular:      Rate and Rhythm: Normal rate. Rhythm irregular.      Pulses: Normal pulses.      Heart sounds: Normal heart sounds.   Pulmonary:      Effort: Pulmonary effort is normal.   Abdominal:      Palpations: Abdomen is soft.      Tenderness: There is no abdominal tenderness.   Musculoskeletal:      Right lower leg: Edema present.      Left lower leg: Edema present.      Comments: Mild non pitting B/L LE edema.    Skin:     General: Skin is warm and dry.      Capillary Refill: Capillary refill takes less than 2 seconds.   Neurological:      General: No focal deficit present.      Mental Status: He is alert and oriented to person, place, and time.   Psychiatric:         Mood and Affect: Mood normal.         Vitals: Blood pressure 141/78, pulse 76, temperature 98.6  "°F (37 °C), temperature source Oral, resp. rate 16, height 5' 4\" (1.626 m), weight 95 kg (209 lb 7 oz), SpO2 98%.,     Body mass index is 35.95 kg/m².,   Systolic (24hrs), Av , Min:115 , Max:162     Diastolic (24hrs), Av, Min:70, Max:89      Intake/Output Summary (Last 24 hours) at 2024 1025  Last data filed at 2024 0738  Gross per 24 hour   Intake 480 ml   Output 1325 ml   Net -845 ml     Weight (last 2 days)       Date/Time Weight    24 0600 95 (209.44)    24 0600 99.8 (220.02)    24 0600 99.1 (218.5)            LABORATORY RESULTS      CBC with diff:   Results from last 7 days   Lab Units 24  0501 24  1022 24  0621 24  0524  1658   WBC Thousand/uL 6.28 6.12 6.46 8.10 8.36   HEMOGLOBIN g/dL 9.3* 9.3* 9.6* 9.0* 9.8*   HEMATOCRIT % 28.4* 28.4* 30.4* 27.4* 30.2*   MCV fL 94 96 98 97 99*   PLATELETS Thousands/uL 221 235 242 245 263   RBC Million/uL 3.01* 2.95* 3.09* 2.84* 3.06*   MCH pg 30.9 31.5 31.1 31.7 32.0   MCHC g/dL 32.7 32.7 31.6 32.8 32.5   RDW % 13.4 13.4 14.0 14.1 14.1   MPV fL 9.1 9.2 9.4 9.2 9.6   NRBC AUTO /100 WBCs 0  --  0 0 0       CMP:  Results from last 7 days   Lab Units 24  0501 24  1022 05/10/24  0550 24  0621 24  0524 24  2209 24  1806   POTASSIUM mmol/L 3.8 4.9 4.1 4.8 4.4 5.4* 7.3*   CHLORIDE mmol/L 102 102 102 102 101 101 101   CO2 mmol/L 29 29 28 26 25 18* 19*   BUN mg/dL 42* 47* 38* 52* 54* 84* 80*   CREATININE mg/dL 6.11* 6.89* 6.20* 7.36* 7.49* 9.44* 9.02*   CALCIUM mg/dL 8.1* 7.9* 8.0* 8.4 8.4 8.5 8.7   AST U/L  --   --   --   --   --   --  15   ALT U/L  --   --   --   --   --   --  17   ALK PHOS U/L  --   --   --   --   --   --  125*   EGFR ml/min/1.73sq m 9 7 8 7 7 5 5       BMP:  Results from last 7 days   Lab Units 24  0501 24  1022 05/10/24  0550 24  0621 24  0524 24  2209 24  1806   POTASSIUM mmol/L 3.8 4.9 4.1 4.8 4.4 5.4* 7.3*   CHLORIDE " "mmol/L 102 102 102 102 101 101 101   CO2 mmol/L 29 29 28 26 25 18* 19*   BUN mg/dL 42* 47* 38* 52* 54* 84* 80*   CREATININE mg/dL 6.11* 6.89* 6.20* 7.36* 7.49* 9.44* 9.02*   CALCIUM mg/dL 8.1* 7.9* 8.0* 8.4 8.4 8.5 8.7       No results found for: \"NTBNP\"     Results from last 7 days   Lab Units 05/09/24  0621 05/08/24  0524 05/07/24  1806 05/07/24  0453 05/06/24  1819   MAGNESIUM mg/dL 2.1 2.1 2.4 2.4 2.7                   Results from last 7 days   Lab Units 05/07/24  1658   INR  1.35*       Lipid Profile:   No results found for: \"CHOL\"  No results found for: \"HDL\"  No results found for: \"LDLCALC\"  No results found for: \"TRIG\"    Cardiac testing:   No results found for this or any previous visit.    No results found for this or any previous visit.    No results found for this or any previous visit.    No valid procedures specified.  No results found for this or any previous visit.      Meds/Allergies   all current active meds have been reviewed and current meds:   Current Facility-Administered Medications   Medication Dose Route Frequency    acetaminophen (TYLENOL) tablet 650 mg  650 mg Oral Q6H PRN    apixaban (ELIQUIS) tablet 5 mg  5 mg Oral BID    chlorhexidine (PERIDEX) 0.12 % oral rinse 15 mL  15 mL Mouth/Throat Q12H [facility]    insulin lispro (HumALOG/ADMELOG) 100 units/mL subcutaneous injection 1-6 Units  1-6 Units Subcutaneous TID AC    metoprolol tartrate (LOPRESSOR) tablet 100 mg  100 mg Oral Q12H [facility]    polyethylene glycol (MIRALAX) packet 17 g  17 g Oral Daily    senna (SENOKOT) tablet 17.2 mg  2 tablet Oral Daily    [START ON 5/14/2024] torsemide (DEMADEX) tablet 40 mg  40 mg Oral Every Other Day    trimethobenzamide (TIGAN) IM injection 200 mg  200 mg Intramuscular Q6H PRN            Counseling / Coordination of Care  Total floor / unit time spent today 20 minutes.  Greater than 50% of total time was spent with the patient and / or family counseling and / or coordination of care.      ** Please Note: " Dragon 360 Dictation voice to text software may have been used in the creation of this document. **

## 2024-05-13 NOTE — CASE MANAGEMENT
Case Management Discharge Planning Note    Patient name Jerod Worthington  Location S /S -01 MRN [id number]  : 1960 Date 2024       Current Admission Date: 5/3/2024  Current Admission Diagnosis:Atrial flutter (HCC)   Patient Active Problem List    Diagnosis Date Noted    Ambulatory dysfunction 2024    Sinus pause 2024    Atrial flutter (HCC) 2024    Scalp abscess 2024    Hypertensive emergency 2024    Stage 3b chronic kidney disease (HCC) 2022    Hyperlipidemia 2022    Acute renal failure superimposed on stage 3 chronic kidney disease (HCC) 2022    Chronic right-sided low back pain with right-sided sciatica 2021    Severe obstructive sleep apnea 10/18/2017    Type 2 diabetes mellitus with hyperglycemia, with long-term current use of insulin (HCC) 2012    History of urinary stone 2012    Essential hypertension 2012      LOS (days): 9  Geometric Mean LOS (GMLOS) (days): 6.1  Days to GMLOS:-3.5     OBJECTIVE:  Risk of Unplanned Readmission Score: 16.09         Current admission status: Inpatient   Preferred Pharmacy:   UNKNOWN - FOLLOW UP PRIOR TO DISCHARGE TO E-PRESCRIBE  No address on file      Primary Care Provider: No primary care provider on file.    Primary Insurance: [employer]  Secondary Insurance:     DISCHARGE DETAILS:    Discharge planning discussed with:: Jalyn Ishmaelluisrenu                                     Other Referral/Resources/Interventions Provided:  Interventions: Short Term Rehab  Referral Comments: CM obtained list of available STR facilities and emailed to jalyn Erika per her request. Erika and Kathi reviewed the list and would like to reserve the bed at [facility] as it is closest to the [facility] (TTS, 10:15am chairtime). Jalyn Armenta made aware that first chair time will be on THURSDAY as patient will remain hospitalized tomorrow until insurance approves his  transition to [facility]. Kathi confirmed she will provide transportation to/from dialysis while patient is at rehab. CM will follow for insurance approval and arrange transportation to [facility] as able. CM will remain available.         Treatment Team Recommendation: Short Term Rehab  Discharge Destination Plan:: Short Term Rehab                                         Additional Comments: Update given to Cesar (rep. [place]) via phone re: patient's continued hospitalization and the need to postpone first dialysis chairtime to Sunday. Update given in [facility] as well.

## 2024-05-13 NOTE — PROGRESS NOTES
NEPHROLOGY HOSPITAL PROGRESS NOTE   Jerod Worthington 63 y.o. male MRN: [id number]  Unit/Bed#: S -01 Encounter: [id number]  Reason for Consult: CEE on CKD    ASSESSMENT and PLAN:  63-year-old male with history of CKD stage IV, diabetes, obesity, hypertension presented with shortness of breath.  Nephrology consulted for CEE.    1.  CEE on CKD.  Baseline creatinine 1.8-2.0 since 2020 1/2/2022.  No labs in 2023.  Admission creatinine 7.25.  Peak creatinine 9.4 on 05/07.  Status post initiation of dialysis on 05/07 due to hyperkalemia.  Access is right IJ permacath.  SPEP/UPEP pending.  Electrolytes and volume status stable today.  Next hemodialysis session will be tomorrow.    2.  CKD stage IV.  Baseline creatinine 1.8-2.0 since 2020 1/2/2022.  No labs in 2023.  Etiology secondary to diabetic kidney disease, obesity related hyper filtration.  He also has nephrotic range proteinuria.    3.  Hyperphosphatemia.  Continue low phosphorus diet.    4.  Volume overload.  Chest x-ray from 05/03 with severe pulmonary edema with small bilateral pleural effusions.  Echocardiogram with LVEF 60%, moderate MR.  Continue ultrafiltration on dialysis days.    5.  Anemia in CKD.  Hemoglobin today 9.3.  Ferritin 58/iron saturation 16%.  Continue IV Venofer with dialysis.    6.  Hypertension.  Blood pressure is currently controlled.  Continue metoprolol 100 mg twice daily.  Continue torsemide 40 mg unknown diet necessities.    Discussed with internal medicine team.  After discussion, we agreed that patient is currently stable for inter-dialytic period and next hemodialysis session will be tomorrow.    SUBJECTIVE / 24H INTERVAL HISTORY:  Patient seen and examined this morning.  Denies dyspnea.  Denies leg swelling.    OBJECTIVE:  Current Weight: Weight - Scale: 95 kg (209 lb 7 oz)  Vitals:    05/12/24 2203 05/12/24 2258 05/13/24 0600 05/13/24 0738   BP: 115/74 137/70  141/78   BP Location:    Left arm   Pulse: 74 74  76   Resp:  18  16    Temp: 98.6 °F (37 °C) 98 °F (36.7 °C)  98.6 °F (37 °C)   TempSrc: Oral   Oral   SpO2: 97% 98%  98%   Weight:   95 kg (209 lb 7 oz)    Height:           Intake/Output Summary (Last 24 hours) at 5/13/2024 0905  Last data filed at 5/13/2024 0738  Gross per 24 hour   Intake 480 ml   Output 1325 ml   Net -845 ml     Review of Systems   Constitutional:  Negative for chills and fever.   HENT:  Negative for ear pain and sore throat.    Eyes:  Negative for pain and visual disturbance.   Respiratory:  Negative for cough and shortness of breath.    Cardiovascular:  Negative for chest pain and palpitations.   Gastrointestinal:  Negative for abdominal pain and vomiting.   Genitourinary:  Negative for dysuria and hematuria.   Musculoskeletal:  Negative for arthralgias and back pain.   Skin:  Negative for color change and rash.   Neurological:  Negative for seizures and syncope.   All other systems reviewed and are negative.    Physical Exam  Vitals and nursing note reviewed.   Constitutional:       General: He is not in acute distress.     Appearance: He is well-developed.   HENT:      Head: Normocephalic and atraumatic.   Eyes:      Conjunctiva/sclera: Conjunctivae normal.   Cardiovascular:      Rate and Rhythm: Normal rate and regular rhythm.      Heart sounds: No murmur heard.     Comments: Right chest wall permacath  Pulmonary:      Effort: Pulmonary effort is normal. No respiratory distress.      Breath sounds: Normal breath sounds.   Abdominal:      Palpations: Abdomen is soft.      Tenderness: There is no abdominal tenderness.   Musculoskeletal:         General: No swelling.      Cervical back: Neck supple.      Right lower leg: No edema.      Left lower leg: No edema.   Skin:     General: Skin is warm and dry.      Capillary Refill: Capillary refill takes less than 2 seconds.   Neurological:      Mental Status: He is alert.   Psychiatric:         Mood and Affect: Mood normal.       Medications:    Current  Facility-Administered Medications:     acetaminophen (TYLENOL) tablet 650 mg, 650 mg, Oral, Q6H PRN, Ronna oMncadause, DO, 650 mg at 05/05/24 0753    apixaban (ELIQUIS) tablet 5 mg, 5 mg, Oral, BID, Ronna Moncadause, DO, 5 mg at 05/12/24 1821    chlorhexidine (PERIDEX) 0.12 % oral rinse 15 mL, 15 mL, Mouth/Throat, Q12H BOBBI, Ronna Lucila Moncadause, DO, 15 mL at 05/12/24 2201    insulin lispro (HumALOG/ADMELOG) 100 units/mL subcutaneous injection 1-6 Units, 1-6 Units, Subcutaneous, TID AC, 1 Units at 05/09/24 1753 **AND** Fingerstick Glucose (POCT), , , TID AC, Ronna Moncadause, DO    metoprolol tartrate (LOPRESSOR) tablet 100 mg, 100 mg, Oral, Q12H BOBBI, Ronna Hurtado, DO, 100 mg at 05/12/24 2204    polyethylene glycol (MIRALAX) packet 17 g, 17 g, Oral, Daily, Araceli Castorena MD, 17 g at 05/12/24 0936    senna (SENOKOT) tablet 17.2 mg, 2 tablet, Oral, Daily, Araceli Castorena MD, 17.2 mg at 05/12/24 0936    [START ON 5/14/2024] torsemide (DEMADEX) tablet 40 mg, 40 mg, Oral, Every Other Day, Brian De Guzman MD    trimethobenzamide (TIGAN) IM injection 200 mg, 200 mg, Intramuscular, Q6H PRN, Ronna Hurtado, DO    Laboratory Results:  Results from last 7 days   Lab Units 05/13/24  0501 05/11/24  1022 05/10/24  0550 05/09/24  0621 05/08/24  0524 05/07/24  2209 05/07/24  1806 05/07/24  1658 05/07/24  0453 05/06/24  1819   WBC Thousand/uL 6.28 6.12  --  6.46 8.10  --   --  8.36  --   --    HEMOGLOBIN g/dL 9.3* 9.3*  --  9.6* 9.0*  --   --  9.8*  --   --    HEMATOCRIT % 28.4* 28.4*  --  30.4* 27.4*  --   --  30.2*  --   --    PLATELETS Thousands/uL 221 235  --  242 245  --   --  263  --   --    POTASSIUM mmol/L 3.8 4.9 4.1 4.8 4.4 5.4* 7.3*  --  5.3 5.5*   CHLORIDE mmol/L 102 102 102 102 101 101 101  --  105 106   CO2 mmol/L 29 29 28 26 25 18* 19*  --  22 20*   BUN mg/dL 42* 47* 38* 52* 54* 84* 80*  --  74* 71*   CREATININE mg/dL 6.11* 6.89* 6.20* 7.36* 7.49* 9.44* 9.02*  " --  8.89* 8.25*   CALCIUM mg/dL 8.1* 7.9* 8.0* 8.4 8.4 8.5 8.7  --  8.1* 8.5   MAGNESIUM mg/dL  --   --   --  2.1 2.1  --  2.4  --  2.4 2.7   PHOSPHORUS mg/dL  --  5.1*  --   --  5.8*  --  5.9*  --   --   --        Portions of the record may have been created with voice recognition software. Occasional wrong word or \"sound a like\" substitutions may have occurred due to the inherent limitations of voice recognition software. Read the chart carefully and recognize, using context, where substitutions have occurred. If you have any questions, please contact the dictating provider.    "

## 2024-05-13 NOTE — ASSESSMENT & PLAN NOTE
Presented with chest pain and shortness of breath  No home medications reported  Meeting criteria for hypertensive emergency in ED with BP of 193/120  Nitroglycerin was administered in the ED, with improvement in BP  S/p Bumex drip now on torsemide    Plan:  Continue to monitor blood pressure 141/86  Controlled with metoprolol and Cardizem  Appreciate cardiology recommendations  Blood pressure stable

## 2024-05-13 NOTE — ASSESSMENT & PLAN NOTE
Home regimen of Lantus 20 units at lunch and at bedtime    Lab Results   Component Value Date    HGBA1C 7.8 (H) 05/03/2024     Recent Labs     05/12/24  2113 05/13/24  0738 05/13/24  1104 05/13/24  1613   POCGLU 206* 129 151* 182*       Blood Sugar Average: Last 72 hrs:  (P) 143.6516687497646832     Latest Reference Range & Units 05/10/24 07:20 05/10/24 11:17 05/10/24 16:12 05/10/24 20:45 05/11/24 07:13   POC Glucose 65 - 140 mg/dl 96 149 (H) 142 (H) 150 (H) 110     Controlled well with current regimen of Lantus and lispro

## 2024-05-13 NOTE — QUICK NOTE
Called son - concern raised that when patient has BM there is some flank pain .  Will order UA and renal US as well as retention protocol/pvr/bladder scan.

## 2024-05-13 NOTE — PLAN OF CARE
Problem: PAIN - ADULT  Goal: Verbalizes/displays adequate comfort level or baseline comfort level  Description: Interventions:  - Encourage patient to monitor pain and request assistance  - Assess pain using appropriate pain scale  - Administer analgesics based on type and severity of pain and evaluate response  - Implement non-pharmacological measures as appropriate and evaluate response  - Consider cultural and social influences on pain and pain management  - Notify physician/advanced practitioner if interventions unsuccessful or patient reports new pain  Outcome: Progressing     Problem: INFECTION - ADULT  Goal: Absence or prevention of progression during hospitalization  Description: INTERVENTIONS:  - Assess and monitor for signs and symptoms of infection  - Monitor lab/diagnostic results  - Monitor all insertion sites, i.e. indwelling lines, tubes, and drains  - Monitor endotracheal if appropriate and nasal secretions for changes in amount and color  - [place] appropriate cooling/warming therapies per order  - Administer medications as ordered  - Instruct and encourage patient and family to use good hand hygiene technique  - Identify and instruct in appropriate isolation precautions for identified infection/condition  Outcome: Progressing  Goal: Absence of fever/infection during neutropenic period  Description: INTERVENTIONS:  - Monitor WBC    Outcome: Progressing     Problem: SAFETY ADULT  Goal: Patient will remain free of falls  Description: INTERVENTIONS:  - Educate patient/family on patient safety including physical limitations  - Instruct patient to call for assistance with activity   - Consult OT/PT to assist with strengthening/mobility   - Keep Call bell within reach  - Keep bed low and locked with side rails adjusted as appropriate  - Keep care items and personal belongings within reach  - Initiate and maintain comfort rounds  - Make Fall Risk Sign visible to staff  - Offer Toileting every 2 Hours,  in advance of need  - Initiate/Maintain bed alarm  - Obtain necessary fall risk management equipment:   - Apply yellow socks and bracelet for high fall risk patients  - Consider moving patient to room near nurses station  Outcome: Progressing  Goal: Maintain or return to baseline ADL function  Description: INTERVENTIONS:  -  Assess patient's ability to carry out ADLs; assess patient's baseline for ADL function and identify physical deficits which impact ability to perform ADLs (bathing, care of mouth/teeth, toileting, grooming, dressing, etc.)  - Assess/evaluate cause of self-care deficits   - Assess range of motion  - Assess patient's mobility; develop plan if impaired  - Assess patient's need for assistive devices and provide as appropriate  - Encourage maximum independence but intervene and supervise when necessary  - Involve family in performance of ADLs  - Assess for home care needs following discharge   - Consider OT consult to assist with ADL evaluation and planning for discharge  - Provide patient education as appropriate  Outcome: Progressing  Goal: Maintains/Returns to pre admission functional level  Description: INTERVENTIONS:  - Perform AM-PAC 6 Click Basic Mobility/ Daily Activity assessment daily.  - Set and communicate daily mobility goal to care team and patient/family/caregiver.   - Collaborate with rehabilitation services on mobility goals if consulted  - Perform Range of Motion 3 times a day.  - Reposition patient every 2 hours.  - Dangle patient 3 times a day  - Stand patient 3 times a day  - Ambulate patient 3 times a day  - Out of bed to chair 3 times a day   - Out of bed for meals 3 times a day  - Out of bed for toileting  - Record patient progress and toleration of activity level   Outcome: Progressing     Problem: DISCHARGE PLANNING  Goal: Discharge to home or other facility with appropriate resources  Description: INTERVENTIONS:  - Identify barriers to discharge w/patient and caregiver  -  Arrange for needed discharge resources and transportation as appropriate  - Identify discharge learning needs (meds, wound care, etc.)  - Arrange for interpretive services to assist at discharge as needed  - Refer to Case Management Department for coordinating discharge planning if the patient needs post-hospital services based on physician/advanced practitioner order or complex needs related to functional status, cognitive ability, or social support system  Outcome: Progressing     Problem: Knowledge Deficit  Goal: Patient/family/caregiver demonstrates understanding of disease process, treatment plan, medications, and discharge instructions  Description: Complete learning assessment and assess knowledge base.  Interventions:  - Provide teaching at level of understanding  - Provide teaching via preferred learning methods  Outcome: Progressing     Problem: METABOLIC, FLUID AND ELECTROLYTES - ADULT  Goal: Electrolytes maintained within normal limits  Description: INTERVENTIONS:  - Monitor labs and assess patient for signs and symptoms of electrolyte imbalances  - Administer electrolyte replacement as ordered  - Monitor response to electrolyte replacements, including repeat lab results as appropriate  - Instruct patient on fluid and nutrition as appropriate  Outcome: Progressing  Goal: Fluid balance maintained  Description: INTERVENTIONS:  - Monitor labs   - Monitor I/O and WT  - Instruct patient on fluid and nutrition as appropriate  - Assess for signs & symptoms of volume excess or deficit  Outcome: Progressing     Problem: SAFETY,RESTRAINT: NV/NON-SELF DESTRUCTIVE BEHAVIOR  Goal: Remains free of harm/injury (restraint for non violent/non self-detsructive behavior)  Description: INTERVENTIONS:  - Instruct patient/family regarding restraint use   - Assess and monitor physiologic and psychological status   - Provide interventions and comfort measures to meet assessed patient needs   - Identify and implement measures to  help patient regain control  - Assess readiness for release of restraint   Outcome: Progressing  Goal: Returns to optimal restraint-free functioning  Description: INTERVENTIONS:  - Assess the patient's behavior and symptoms that indicate continued need for restraint  - Identify and implement measures to help patient regain control  - Assess readiness for release of restraint   Outcome: Progressing     Problem: Prexisting or High Potential for Compromised Skin Integrity  Goal: Skin integrity is maintained or improved  Description: INTERVENTIONS:  - Identify patients at risk for skin breakdown  - Assess and monitor skin integrity  - Assess and monitor nutrition and hydration status  - Monitor labs   - Assess for incontinence   - Turn and reposition patient  - Assist with mobility/ambulation  - Relieve pressure over bony prominences  - Avoid friction and shearing  - Provide appropriate hygiene as needed including keeping skin clean and dry  - Evaluate need for skin moisturizer/barrier cream  - Collaborate with interdisciplinary team   - Patient/family teaching  - Consider wound care consult   Outcome: Progressing     Problem: Nutrition/Hydration-ADULT  Goal: Nutrient/Hydration intake appropriate for improving, restoring or maintaining nutritional needs  Description: Monitor and assess patient's nutrition/hydration status for malnutrition. Collaborate with interdisciplinary team and initiate plan and interventions as ordered.  Monitor patient's weight and dietary intake as ordered or per policy. Utilize nutrition screening tool and intervene as necessary. Determine patient's food preferences and provide high-protein, high-caloric foods as appropriate.     INTERVENTIONS:  - Monitor oral intake, urinary output, labs, and treatment plans  - Assess nutrition and hydration status and recommend course of action  - Evaluate amount of meals eaten  - Assist patient with eating if necessary   - Allow adequate time for meals  -  Recommend/ encourage appropriate diets, oral nutritional supplements, and vitamin/mineral supplements  - Order, calculate, and assess calorie counts as needed  - Recommend, monitor, and adjust tube feedings and TPN/PPN based on assessed needs  - Assess need for intravenous fluids  - Provide specific nutrition/hydration education as appropriate  - Include patient/family/caregiver in decisions related to nutrition  Outcome: Progressing

## 2024-05-13 NOTE — CASE MANAGEMENT
[facility] received request for authorization from Care Manager.  Authorization request submitted for: SNF  Facility Name: [facility] NPI: [id number]  Facility MD: Sigifredo Velez NPI: [id number]  Authorization initiated by contacting insurance: [employer]   Via: Fax  Clinicals submitted via fax # [phone]    Care Manager notified: Yanira Casillas     Updates to authorization status will be noted in chart. Please reach out to CM for updates on any clinical information.

## 2024-05-13 NOTE — PLAN OF CARE
Problem: PHYSICAL THERAPY ADULT  Goal: Performs mobility at highest level of function for planned discharge setting.  See evaluation for individualized goals.  Description: Treatment/Interventions: Functional transfer training, LE strengthening/ROM, Elevations, Therapeutic exercise, Endurance training, Patient/family training, Equipment eval/education, Bed mobility, Gait training, Spoke to nursing, Spoke to case management    Equipment Recommended: Walker    See flowsheet documentation for full assessment, interventions and recommendations.  Outcome: Progressing  Note: Prognosis: Fair  Problem List: Decreased strength, Decreased endurance, Impaired balance, Decreased mobility, Impaired judgement, Decreased safety awareness, Obesity  Assessment: Pt seen this session for OOB mobility, progression of ambulation, decreased reliance on AD, as pt mobilizes without AD at true baseline. Pt demonstrates a good progression in overall mobility status as compared to Re-evaluation. Pt progressed to all around [place] for all aspects of mobility. With ambulation, again trialed with and without AD today. Pt demonstrates less significant lateral devaitions in his gait, however continues to have these deviations. Pt without significant LOB today as comapred to previous session as well. Stair trial attempted this session, as patient has a full flight of stairs in order to access home. Pt only able to complete 5 stairs today due to R hip pain. Overall pt does demonstrate a good progression of his mobility status and toward his goals. Would continue to reccomend a level II disposition at discharge.  Barriers to Discharge: Inaccessible home environment (full flight to access home)     Rehab Resource Intensity Level, PT: II (Moderate Resource Intensity)    See flowsheet documentation for full assessment.

## 2024-05-13 NOTE — ASSESSMENT & PLAN NOTE
POA  Patient with history of stage IIIb CKD  Previously known baseline creatinine of 1.9-2     Recent Labs     05/11/24  1022 05/13/24  0501   CREATININE 6.89* 6.11*   EGFR 7 9       Estimated Creatinine Clearance: 13.2 mL/min (A) (by C-G formula based on SCr of 6.11 mg/dL (H)).    -Patient placed on Bumex drip 2mg/hr per nephrology with urine output of 5,175 over 24h by the morning of 5/6  -Sodium bicarb decreased to 325mg BID on 5/6, as acid-base balance improved  -Nephrology decreased Bumex drip to 1mg/hr on 5/6, with  over 24h by the morning of 5/7  -Urine output decreased from previous day, while on Bumex drip    Plan:  Continue oral diuretics of torsemide per nephrology on nondialysis days  Continue dialysis Monday Wednesday Friday while inpatient case management notes a chair has been located for Tuesday Thursday Saturday on discharge  Discussed with nephrology and medically stable for DC  Physical therapy therapy recommended rehab  Discussed with CM and no auth as of yet.

## 2024-05-13 NOTE — PHYSICAL THERAPY NOTE
Physical Therapy Treatment Note    Patient's Name: Jerod Worthington  : 24 0933   PT Last Visit   PT Visit Date 24   Note Type   Note Type Treatment for insurance authorization   Pain Assessment   Pain Assessment Tool FLACC   Pain Location/Orientation Orientation: Right;Location: Hip   Pain Onset/Description Onset: Ongoing;Frequency: Intermittent   Effect of Pain on Daily Activities limits mobility, limits ambulation and stair negotiation   Hospital Pain Intervention(s) Repositioned;Other (Comment);Ambulation/increased activity  (RN aware)   Pain Rating: FLACC (Rest) - Face 0   Pain Rating: FLACC (Rest) - Legs 0   Pain Rating: FLACC (Rest) - Activity 0   Pain Rating: FLACC (Rest) - Cry 0   Pain Rating: FLACC (Activity) - Face 1   Pain Rating: FLACC (Activity) - Legs 1   Pain Rating: FLACC (Activity) - Activity 1   Pain Rating: FLACC (Activity) - Cry 1   Pain Rating: FLACC (Activity) - Consolability 1   Score: FLACC (Activity) 5   Restrictions/Precautions   Weight Bearing Precautions Per Order No   Other Precautions Chair Alarm;Bed Alarm;Fall Risk;Pain  (language barrier)   General   Chart Reviewed Yes   Family/Caregiver Present No   Cognition   Orientation Level Oriented to person;Oriented to situation   Following Commands Follows one step commands with increased time or repetition   Comments pt ID by wristband, name and , utilizes  throughout session   Subjective   Subjective pt agreeable to PT treatment   Bed Mobility   Supine to Sit 4  Minimal assistance   Additional items Assist x 1;Increased time required  (trunk management)   Additional Comments denies light headedness/dizziness, utilizes bed features to initiate bed mobility   Transfers   Sit to Stand 4  Minimal assistance   Additional items Assist x 1;Increased time required;Verbal cues   Stand to Sit 4  Minimal assistance   Additional items Assist x  "1;Increased time required;Verbal cues   Stand pivot 4  Minimal assistance   Additional items Assist x 1;Increased time required;Verbal cues   Additional Comments HHA for STS transfer, grossly unsteady during ambulatory SPT to bed side recliner   Ambulation/Elevation   Gait pattern Decreased foot clearance;Short stride;Improper Weight shift   Gait Assistance 4  Minimal assist   Additional items Assist x 1;Verbal cues   Assistive Device Rolling walker;Other (Comment)  (HHA)   Distance 25'x2(HHA) 25'x4(RW)   Stair Management Assistance 4  Minimal assist   Additional items Assist x 1;Increased time required;Verbal cues   Stair Management Technique One rail L;Step to pattern;Foreward   Number of Stairs 5  (unable to complete further 2/2 R hip pain, pt unable to rate pain, was repeating to  \"it hurts on the inside\")   Ambulation/Elevation Additional Comments progression to min A with and without RW. pt grossly unsteady with mobility, continues to demonstrate lateral deviations, however amplitude/significance of deviations decreased as compared to previous session   Balance   Static Sitting Fair +   Dynamic Sitting Fair   Static Standing Fair -   Dynamic Standing Poor +   Ambulatory Poor +   Activity Tolerance   Activity Tolerance Patient limited by fatigue;Patient limited by pain  (language barrier?)   Medical Staff Made Aware spoke with CM   Nurse Made Aware RN pre/post   Assessment   Prognosis Fair   Problem List Decreased strength;Decreased endurance;Impaired balance;Decreased mobility;Impaired judgement;Decreased safety awareness;Obesity   Assessment Pt seen this session for OOB mobility, progression of ambulation, decreased reliance on AD, as pt mobilizes without AD at true baseline. Pt demonstrates a good progression in overall mobility status as compared to Re-evaluation. Pt progressed to all around [place] for all aspects of mobility. With ambulation, again trialed with and without AD today. Pt " "demonstrates less significant lateral devaitions in his gait, however continues to have these deviations. Pt without significant LOB today as comapred to previous session as well. Stair trial attempted this session, as patient has a full flight of stairs in order to access home. Pt only able to complete 5 stairs today due to R hip pain. Overall pt does demonstrate a good progression of his mobility status and toward his goals. Would continue to reccomend a level II disposition at discharge.   Barriers to Discharge Inaccessible home environment  (full flight to access home)   Goals   Patient Goals no therapy goals stated by pt   STG Expiration Date 05/20/24   Short Term Goal #1 Patient PT goals established in order to address pt self reported goal of \"to go home\". Pt will: complete all bed mobility independently in order to promote increased OOB functional mobility and simulate home environment; complete all transfers independently in order to increase safety with functional mobility; ambulate >150ft with LRAD at [place] level in order to increase safety with household and short community functional mobility; negotiate 8-10 stairs with HR assist and S in order to facilitate safe access to his home; demonstrate understanding and independence with LE strengthening HEP; improve ambulatory balance to >/= good grade with LRAD in order to promote safety and increased independence with mobility; improve AM-PAC score to >/= 23/24 in order to increase independence with mobility and decrease burden of care; improve Barthel Index score to >/= 75/100 in order to increase independence and decrease risk of falls.   PT Treatment Day 2   Plan   Treatment/Interventions Functional transfer training;LE strengthening/ROM;Elevations;Therapeutic exercise;Cognitive reorientation;Patient/family training;Equipment eval/education;Bed mobility;Gait training;Spoke to nursing;Spoke to case management;OT   Progress Slow progress, medical status " limitations   PT Frequency 3-5x/wk   Discharge Recommendation   Rehab Resource Intensity Level, PT II (Moderate Resource Intensity)   Equipment Recommended Walker   Walker Package Recommended Wheeled walker   Change/add to Walker Package? No   AM-PAC Basic Mobility Inpatient   Turning in Flat Bed Without Bedrails 2   Lying on Back to Sitting on Edge of Flat Bed Without Bedrails 2   Moving Bed to Chair 3   Standing Up From Chair Using Arms 3   Walk in Room 3   Climb 3-5 Stairs With Railing 3   Basic Mobility Inpatient Raw Score 16   Basic Mobility Standardized Score 38.32   [facility] Highest Level Of Mobility   -HL Goal 5: Stand one or more mins   -HL Achieved 7: Walk 25 feet or more   Education   Education Provided Mobility training;Assistive device   Patient Reinforcement needed   End of Consult   Patient Position at End of Consult Bedside chair;Bed/Chair alarm activated   The patient's AM-PAC Basic Mobility Inpatient Short Form Raw Score is 13. A Raw score of less than or equal to 16 suggests the patient may benefit from discharge to post-acute rehabilitation services. Please also refer to the recommendation of the Physical Therapist for safe discharge planning.    Boy Yip, PT

## 2024-05-13 NOTE — PROGRESS NOTES
[facility]  Progress Note  Name: Jerod Worthington I  MRN: [id number]  Unit/Bed#: S -01 I Date of Admission: 5/3/2024   Date of Service: 5/13/2024 I Hospital Day: 9    Assessment/Plan   Ambulatory dysfunction  Assessment & Plan  Seen by physical therapy and Occupational Therapy, recommendations for rehab.  Family meeting noted May 10 and all in agreement with blanket referrals.  Appreciate case management support    Sinus pause  Assessment & Plan  Status post atropine with rapid response called for 10-second pauses post permacath placement during hospitalization.  Cardiology support appreciated, continue metoprolol  No further reports of sinus pause    Scalp abscess  Assessment & Plan  Patient was noted to have abscess on admissions that was drained and patient is s/p keflex x 5 days.    Hypertensive emergency  Assessment & Plan  C/w lopressor and cardizem.  Now stable with systolic in the 140s        Essential hypertension  Assessment & Plan  Presented with chest pain and shortness of breath  No home medications reported  Meeting criteria for hypertensive emergency in ED with BP of 193/120  Nitroglycerin was administered in the ED, with improvement in BP  S/p Bumex drip now on torsemide    Plan:  Continue to monitor blood pressure 141/86  Controlled with metoprolol and Cardizem  Appreciate cardiology recommendations  Blood pressure stable      Type 2 diabetes mellitus with hyperglycemia, with long-term current use of insulin (HCC)  Assessment & Plan  Home regimen of Lantus 20 units at lunch and at bedtime    Lab Results   Component Value Date    HGBA1C 7.8 (H) 05/03/2024     Recent Labs     05/12/24  2113 05/13/24  0738 05/13/24  1104 05/13/24  1613   POCGLU 206* 129 151* 182*       Blood Sugar Average: Last 72 hrs:  (P) 143.5991063136682347     Latest Reference Range & Units 05/10/24 07:20 05/10/24 11:17 05/10/24 16:12 05/10/24 20:45 05/11/24 07:13   POC Glucose 65 - 140 mg/dl 96 149 (H)  142 (H) 150 (H) 110     Controlled well with current regimen of Lantus and lispro    Severe obstructive sleep apnea  Assessment & Plan  Hx of BOBBY with CPAP use at home  - patient endorse nightly use  Patient was started on BiPAP for respiratory distress in ED  Patient now off BiPAP    Plan:  Continue CPAP nightly    Acute renal failure superimposed on stage 3 chronic kidney disease (HCC)  Assessment & Plan  POA  Patient with history of stage IIIb CKD  Previously known baseline creatinine of 1.9-2     Recent Labs     05/11/24  1022 05/13/24  0501   CREATININE 6.89* 6.11*   EGFR 7 9       Estimated Creatinine Clearance: 13.2 mL/min (A) (by C-G formula based on SCr of 6.11 mg/dL (H)).    -Patient placed on Bumex drip 2mg/hr per nephrology with urine output of 5,175 over 24h by the morning of 5/6  -Sodium bicarb decreased to 325mg BID on 5/6, as acid-base balance improved  -Nephrology decreased Bumex drip to 1mg/hr on 5/6, with  over 24h by the morning of 5/7  -Urine output decreased from previous day, while on Bumex drip    Plan:  Continue oral diuretics of torsemide per nephrology on nondialysis days  Continue dialysis Monday Wednesday Friday while inpatient case management notes a chair has been located for Tuesday Thursday Saturday on discharge  Discussed with nephrology and medically stable for DC  Physical therapy therapy recommended rehab  Discussed with CM and no auth as of yet.     * Atrial flutter (HCC)  Assessment & Plan  Patient with new onset atrial flutter.  Continue anticoagulation with Eliquis, and rate control with Cardizem and metoprolol.    Appreciate cardiology support- they signed off as of today                  VTE Pharmacologic Prophylaxis: VTE Score: 4 Moderate Risk (Score 3-4) - Pharmacological DVT Prophylaxis Ordered: heparin.    Mobility:   Basic Mobility Inpatient Raw Score: 16  JH-HLM Goal: 5: Stand one or more mins  JH-HLM Achieved: 7: Walk 25 feet or more  Awaiting rehab.  "    Patient Centered Rounds: I performed bedside rounds with nursing staff today.   Discussions with Specialists or Other Care Team Provider: Discussed with nephrology stable from their standpoint.     Education and Discussions with Family / Patient:  called son - .     Total Time Spent on Date of Encounter in care of patient: 30 mins. This time was spent on one or more of the following: performing physical exam; counseling and coordination of care; obtaining or reviewing history; documenting in the medical record; reviewing/ordering tests, medications or procedures; communicating with other healthcare professionals and discussing with patient's family/caregivers.    Current Length of Stay: 9 day(s)  Current Patient Status: Inpatient   Certification Statement: The patient will continue to require additional inpatient hospital stay due to awaiting placement.   Discharge Plan: Anticipate discharge in 24-48 hrs to medically stable for DC, waiting for auth.    Code Status: Level 1 - Full Code    Subjective:   Patient seen and examined   Feeling \"fine\"    Objective:     Vitals:   Temp (24hrs), Av.3 °F (36.8 °C), Min:98 °F (36.7 °C), Max:98.6 °F (37 °C)    Temp:  [98 °F (36.7 °C)-98.6 °F (37 °C)] 98.1 °F (36.7 °C)  HR:  [74-77] 77  Resp:  [16-18] 18  BP: (115-141)/(70-86) 141/86  SpO2:  [97 %-100 %] 100 %  Body mass index is 35.95 kg/m².     Input and Output Summary (last 24 hours):     Intake/Output Summary (Last 24 hours) at 2024 1656  Last data filed at 2024 1052  Gross per 24 hour   Intake 480 ml   Output 675 ml   Net -195 ml       Physical Exam:   Physical Exam  Constitutional:       General: He is not in acute distress.     Appearance: He is not ill-appearing, toxic-appearing or diaphoretic.   Eyes:      Pupils: Pupils are equal, round, and reactive to light.   Cardiovascular:      Rate and Rhythm: Normal rate.   Pulmonary:      Effort: No respiratory distress.      Breath sounds: No stridor. No " wheezing, rhonchi or rales.   Chest:      Chest wall: No tenderness.   Abdominal:      General: There is no distension.      Palpations: There is no mass.      Tenderness: There is no abdominal tenderness. There is no right CVA tenderness, left CVA tenderness, guarding or rebound.      Hernia: No hernia is present.   Musculoskeletal:      Right lower leg: No edema.      Left lower leg: No edema.   Skin:     Coloration: Skin is not jaundiced or pale.      Findings: No bruising, erythema, lesion or rash.   Neurological:      General: No focal deficit present.      Mental Status: He is alert.      Cranial Nerves: No cranial nerve deficit.      Sensory: No sensory deficit.      Motor: No weakness.      Coordination: Coordination normal.      Gait: Gait normal.      Deep Tendon Reflexes: Reflexes normal.   Psychiatric:         Mood and Affect: Mood normal.          Additional Data:     Labs:  Results from last 7 days   Lab Units 05/13/24  0501   WBC Thousand/uL 6.28   HEMOGLOBIN g/dL 9.3*   HEMATOCRIT % 28.4*   PLATELETS Thousands/uL 221   SEGS PCT % 60   LYMPHO PCT % 27   MONO PCT % 9   EOS PCT % 3     Results from last 7 days   Lab Units 05/13/24  0501 05/07/24  2209 05/07/24  1806   SODIUM mmol/L 139   < > 132*   POTASSIUM mmol/L 3.8   < > 7.3*   CHLORIDE mmol/L 102   < > 101   CO2 mmol/L 29   < > 19*   BUN mg/dL 42*   < > 80*   CREATININE mg/dL 6.11*   < > 9.02*   ANION GAP mmol/L 8   < > 12   CALCIUM mg/dL 8.1*   < > 8.7   ALBUMIN g/dL  --   --  3.2*   TOTAL BILIRUBIN mg/dL  --   --  0.29   ALK PHOS U/L  --   --  125*   ALT U/L  --   --  17   AST U/L  --   --  15   GLUCOSE RANDOM mg/dL 136   < > 133    < > = values in this interval not displayed.     Results from last 7 days   Lab Units 05/07/24  1658   INR  1.35*     Results from last 7 days   Lab Units 05/13/24  1613 05/13/24  1104 05/13/24  0738 05/12/24  2113 05/12/24  1604 05/12/24  1222 05/12/24  0715 05/11/24  2107 05/11/24  1616 05/11/24  1112 05/11/24  0713  05/10/24  2045   POC GLUCOSE mg/dl 182* 151* 129 206* 115 133 134 186* 128 145* 110 150*         Results from last 7 days   Lab Units 05/07/24  1658   LACTIC ACID mmol/L 1.3       Lines/Drains:  Invasive Devices       Peripheral Intravenous Line  Duration             Peripheral IV 05/13/24 Distal;Right;Upper;Ventral (anterior) Arm <1 day              Hemodialysis Catheter  Duration             HD Permanent Double Catheter 6 days                          Imaging: No pertinent imaging reviewed.    Recent Cultures (last 7 days):         Last 24 Hours Medication List:   Current Facility-Administered Medications   Medication Dose Route Frequency Provider Last Rate    acetaminophen  650 mg Oral Q6H PRN Ronna Hurtado DO      apixaban  5 mg Oral BID Ronna Hurtado DO      chlorhexidine  15 mL Mouth/Throat Q12H [facility] Ronna Hurtado,       insulin lispro  1-6 Units Subcutaneous TID AC Ronna Hurtado DO      metoprolol tartrate  100 mg Oral Q12H [facility] Ronna Hurtado DO      polyethylene glycol  17 g Oral Daily Araclei Castorena MD      senna  2 tablet Oral Daily Araceli Castorena MD      [START ON 5/14/2024] torsemide  40 mg Oral Every Other Day Brian De Guzman MD      trimethobenzamide  200 mg Intramuscular Q6H PRN Ronna Hurtado DO          Today, Patient Was Seen By: Graciela Esposito MD    **Please Note: This note may have been constructed using a voice recognition system.**

## 2024-05-13 NOTE — ASSESSMENT & PLAN NOTE
Patient with new onset atrial flutter.  Continue anticoagulation with Eliquis, and rate control with Cardizem and metoprolol.    Appreciate cardiology support- they signed off as of today

## 2024-05-14 ENCOUNTER — APPOINTMENT (INPATIENT)
Dept: DIALYSIS | Facility: HOSPITAL | Age: 64
DRG: 469 | End: 2024-05-14
Attending: INTERNAL MEDICINE
Payer: MEDICARE

## 2024-05-14 LAB
ALBUMIN SERPL ELPH-MCNC: 2.82 G/DL (ref 3.2–5.1)
ALBUMIN SERPL ELPH-MCNC: 47.8 % (ref 48–70)
ALBUMIN UR ELPH-MCNC: 66.3 %
ALPHA1 GLOB MFR UR ELPH: 8.8 %
ALPHA1 GLOB SERPL ELPH-MCNC: 0.29 G/DL (ref 0.15–0.47)
ALPHA1 GLOB SERPL ELPH-MCNC: 4.9 % (ref 1.8–7)
ALPHA2 GLOB MFR UR ELPH: 3.9 %
ALPHA2 GLOB SERPL ELPH-MCNC: 0.78 G/DL (ref 0.42–1.04)
ALPHA2 GLOB SERPL ELPH-MCNC: 13.2 % (ref 5.9–14.9)
ANION GAP SERPL CALCULATED.3IONS-SCNC: 10 MMOL/L (ref 4–13)
B-GLOBULIN MFR UR ELPH: 7.5 %
BETA GLOB ABNORMAL SERPL ELPH-MCNC: 0.32 G/DL (ref 0.31–0.57)
BETA1 GLOB SERPL ELPH-MCNC: 5.4 % (ref 4.7–7.7)
BETA2 GLOB SERPL ELPH-MCNC: 5.3 % (ref 3.1–7.9)
BETA2+GAMMA GLOB SERPL ELPH-MCNC: 0.31 G/DL (ref 0.2–0.58)
BUN SERPL-MCNC: 51 MG/DL (ref 5–25)
CALCIUM SERPL-MCNC: 8.1 MG/DL (ref 8.4–10.2)
CHLORIDE SERPL-SCNC: 103 MMOL/L (ref 96–108)
CO2 SERPL-SCNC: 25 MMOL/L (ref 21–32)
CREAT SERPL-MCNC: 6.62 MG/DL (ref 0.6–1.3)
ERYTHROCYTE [DISTWIDTH] IN BLOOD BY AUTOMATED COUNT: 13.6 % (ref 11.6–15.1)
GAMMA GLOB ABNORMAL SERPL ELPH-MCNC: 1.38 G/DL (ref 0.4–1.66)
GAMMA GLOB MFR UR ELPH: 13.5 %
GAMMA GLOB SERPL ELPH-MCNC: 23.4 % (ref 6.9–22.3)
GFR SERPL CREATININE-BSD FRML MDRD: 8 ML/MIN/1.73SQ M
GLUCOSE SERPL-MCNC: 119 MG/DL (ref 65–140)
GLUCOSE SERPL-MCNC: 123 MG/DL (ref 65–140)
GLUCOSE SERPL-MCNC: 123 MG/DL (ref 65–140)
GLUCOSE SERPL-MCNC: 151 MG/DL (ref 65–140)
GLUCOSE SERPL-MCNC: 161 MG/DL (ref 65–140)
GLUCOSE SERPL-MCNC: 203 MG/DL (ref 65–140)
HCT VFR BLD AUTO: 28.3 % (ref 36.5–49.3)
HGB BLD-MCNC: 9.3 G/DL (ref 12–17)
IGG/ALB SER: 0.92 {RATIO} (ref 1.1–1.8)
INTERPRETATION UR IFE-IMP: NORMAL
INTERPRETATION UR IFE-IMP: NORMAL
MCH RBC QN AUTO: 31.2 PG (ref 26.8–34.3)
MCHC RBC AUTO-ENTMCNC: 32.9 G/DL (ref 31.4–37.4)
MCV RBC AUTO: 95 FL (ref 82–98)
PLATELET # BLD AUTO: 219 THOUSANDS/UL (ref 149–390)
PMV BLD AUTO: 9.3 FL (ref 8.9–12.7)
POTASSIUM SERPL-SCNC: 3.8 MMOL/L (ref 3.5–5.3)
PROT PATTERN SERPL ELPH-IMP: ABNORMAL
PROT PATTERN UR ELPH-IMP: NORMAL
PROT SERPL-MCNC: 5.9 G/DL (ref 6.4–8.2)
PROT UR-MCNC: 208.1 MG/DL
RBC # BLD AUTO: 2.98 MILLION/UL (ref 3.88–5.62)
SODIUM SERPL-SCNC: 138 MMOL/L (ref 135–147)
WBC # BLD AUTO: 7.36 THOUSAND/UL (ref 4.31–10.16)

## 2024-05-14 PROCEDURE — 80048 BASIC METABOLIC PNL TOTAL CA: CPT | Performed by: INTERNAL MEDICINE

## 2024-05-14 PROCEDURE — 85027 COMPLETE CBC AUTOMATED: CPT | Performed by: INTERNAL MEDICINE

## 2024-05-14 PROCEDURE — 99232 SBSQ HOSP IP/OBS MODERATE 35: CPT | Performed by: INTERNAL MEDICINE

## 2024-05-14 PROCEDURE — 90935 HEMODIALYSIS ONE EVALUATION: CPT | Performed by: INTERNAL MEDICINE

## 2024-05-14 PROCEDURE — NC001 PR NO CHARGE: Performed by: PHYSICIAN ASSISTANT

## 2024-05-14 PROCEDURE — 84166 PROTEIN E-PHORESIS/URINE/CSF: CPT | Performed by: STUDENT IN AN ORGANIZED HEALTH CARE EDUCATION/TRAINING PROGRAM

## 2024-05-14 PROCEDURE — 84165 PROTEIN E-PHORESIS SERUM: CPT | Performed by: STUDENT IN AN ORGANIZED HEALTH CARE EDUCATION/TRAINING PROGRAM

## 2024-05-14 PROCEDURE — 82948 REAGENT STRIP/BLOOD GLUCOSE: CPT

## 2024-05-14 PROCEDURE — 86335 IMMUNFIX E-PHORSIS/URINE/CSF: CPT | Performed by: STUDENT IN AN ORGANIZED HEALTH CARE EDUCATION/TRAINING PROGRAM

## 2024-05-14 PROCEDURE — 86334 IMMUNOFIX E-PHORESIS SERUM: CPT | Performed by: STUDENT IN AN ORGANIZED HEALTH CARE EDUCATION/TRAINING PROGRAM

## 2024-05-14 RX ORDER — AMLODIPINE BESYLATE 5 MG/1
5 TABLET ORAL DAILY
Status: DISCONTINUED | OUTPATIENT
Start: 2024-05-15 | End: 2024-05-15 | Stop reason: HOSPADM

## 2024-05-14 RX ADMIN — INSULIN LISPRO 1 UNITS: 100 INJECTION, SOLUTION INTRAVENOUS; SUBCUTANEOUS at 11:50

## 2024-05-14 RX ADMIN — APIXABAN 5 MG: 5 TABLET, FILM COATED ORAL at 09:06

## 2024-05-14 RX ADMIN — IRON SUCROSE 100 MG: 20 INJECTION, SOLUTION INTRAVENOUS at 10:16

## 2024-05-14 RX ADMIN — ACETAMINOPHEN 650 MG: 325 TABLET, FILM COATED ORAL at 21:29

## 2024-05-14 RX ADMIN — CHLORHEXIDINE GLUCONATE 15 ML: 1.2 RINSE ORAL at 20:59

## 2024-05-14 RX ADMIN — APIXABAN 5 MG: 5 TABLET, FILM COATED ORAL at 17:50

## 2024-05-14 RX ADMIN — SENNOSIDES 17.2 MG: 8.6 TABLET, FILM COATED ORAL at 09:06

## 2024-05-14 RX ADMIN — CHLORHEXIDINE GLUCONATE 15 ML: 1.2 RINSE ORAL at 09:06

## 2024-05-14 RX ADMIN — METOPROLOL TARTRATE 100 MG: 100 TABLET, FILM COATED ORAL at 20:59

## 2024-05-14 RX ADMIN — POLYETHYLENE GLYCOL 3350 17 G: 17 POWDER, FOR SOLUTION ORAL at 09:06

## 2024-05-14 NOTE — PLAN OF CARE
Problem: PAIN - ADULT  Goal: Verbalizes/displays adequate comfort level or baseline comfort level  Description: Interventions:  - Encourage patient to monitor pain and request assistance  - Assess pain using appropriate pain scale  - Administer analgesics based on type and severity of pain and evaluate response  - Implement non-pharmacological measures as appropriate and evaluate response  - Consider cultural and social influences on pain and pain management  - Notify physician/advanced practitioner if interventions unsuccessful or patient reports new pain  Outcome: Progressing     Problem: INFECTION - ADULT  Goal: Absence or prevention of progression during hospitalization  Description: INTERVENTIONS:  - Assess and monitor for signs and symptoms of infection  - Monitor lab/diagnostic results  - Monitor all insertion sites, i.e. indwelling lines, tubes, and drains  - Monitor endotracheal if appropriate and nasal secretions for changes in amount and color  - Wingate appropriate cooling/warming therapies per order  - Administer medications as ordered  - Instruct and encourage patient and family to use good hand hygiene technique  - Identify and instruct in appropriate isolation precautions for identified infection/condition  Outcome: Progressing  Goal: Absence of fever/infection during neutropenic period  Description: INTERVENTIONS:  - Monitor WBC    Outcome: Progressing     Problem: SAFETY ADULT  Goal: Patient will remain free of falls  Description: INTERVENTIONS:  - Educate patient/family on patient safety including physical limitations  - Instruct patient to call for assistance with activity   - Consult OT/PT to assist with strengthening/mobility   - Keep Call bell within reach  - Keep bed low and locked with side rails adjusted as appropriate  - Keep care items and personal belongings within reach  - Initiate and maintain comfort rounds  - Make Fall Risk Sign visible to staff  - Offer Toileting every 2 Hours,  in advance of need  - Initiate/Maintain bed alarm  - Obtain necessary fall risk management equipment  - Apply yellow socks and bracelet for high fall risk patients  - Consider moving patient to room near nurses station  Outcome: Progressing  Goal: Maintain or return to baseline ADL function  Description: INTERVENTIONS:  -  Assess patient's ability to carry out ADLs; assess patient's baseline for ADL function and identify physical deficits which impact ability to perform ADLs (bathing, care of mouth/teeth, toileting, grooming, dressing, etc.)  - Assess/evaluate cause of self-care deficits   - Assess range of motion  - Assess patient's mobility; develop plan if impaired  - Assess patient's need for assistive devices and provide as appropriate  - Encourage maximum independence but intervene and supervise when necessary  - Involve family in performance of ADLs  - Assess for home care needs following discharge   - Consider OT consult to assist with ADL evaluation and planning for discharge  - Provide patient education as appropriate  Outcome: Progressing  Goal: Maintains/Returns to pre admission functional level  Description: INTERVENTIONS:  - Perform AM-PAC 6 Click Basic Mobility/ Daily Activity assessment daily.  - Set and communicate daily mobility goal to care team and patient/family/caregiver.   - Collaborate with rehabilitation services on mobility goals if consulted  - Perform Range of Motion 3 times a day.  - Reposition patient every 2 hours.  - Dangle patient 3 times a day  - Stand patient 3 times a day  - Ambulate patient 3 times a day  - Out of bed to chair 3 times a day   - Out of bed for meals 3 times a day  - Out of bed for toileting  - Record patient progress and toleration of activity level   Outcome: Progressing     Problem: DISCHARGE PLANNING  Goal: Discharge to home or other facility with appropriate resources  Description: INTERVENTIONS:  - Identify barriers to discharge w/patient and caregiver  -  Arrange for needed discharge resources and transportation as appropriate  - Identify discharge learning needs (meds, wound care, etc.)  - Arrange for interpretive services to assist at discharge as needed  - Refer to Case Management Department for coordinating discharge planning if the patient needs post-hospital services based on physician/advanced practitioner order or complex needs related to functional status, cognitive ability, or social support system  Outcome: Progressing     Problem: Knowledge Deficit  Goal: Patient/family/caregiver demonstrates understanding of disease process, treatment plan, medications, and discharge instructions  Description: Complete learning assessment and assess knowledge base.  Interventions:  - Provide teaching at level of understanding  - Provide teaching via preferred learning methods  Outcome: Progressing     Problem: METABOLIC, FLUID AND ELECTROLYTES - ADULT  Goal: Electrolytes maintained within normal limits  Description: INTERVENTIONS:  - Monitor labs and assess patient for signs and symptoms of electrolyte imbalances  - Administer electrolyte replacement as ordered  - Monitor response to electrolyte replacements, including repeat lab results as appropriate  - Instruct patient on fluid and nutrition as appropriate  Outcome: Progressing  Goal: Fluid balance maintained  Description: INTERVENTIONS:  - Monitor labs   - Monitor I/O and WT  - Instruct patient on fluid and nutrition as appropriate  - Assess for signs & symptoms of volume excess or deficit  Outcome: Progressing     Problem: SAFETY,RESTRAINT: NV/NON-SELF DESTRUCTIVE BEHAVIOR  Goal: Remains free of harm/injury (restraint for non violent/non self-detsructive behavior)  Description: INTERVENTIONS:  - Instruct patient/family regarding restraint use   - Assess and monitor physiologic and psychological status   - Provide interventions and comfort measures to meet assessed patient needs   - Identify and implement measures to  help patient regain control  - Assess readiness for release of restraint   Outcome: Progressing  Goal: Returns to optimal restraint-free functioning  Description: INTERVENTIONS:  - Assess the patient's behavior and symptoms that indicate continued need for restraint  - Identify and implement measures to help patient regain control  - Assess readiness for release of restraint   Outcome: Progressing     Problem: Prexisting or High Potential for Compromised Skin Integrity  Goal: Skin integrity is maintained or improved  Description: INTERVENTIONS:  - Identify patients at risk for skin breakdown  - Assess and monitor skin integrity  - Assess and monitor nutrition and hydration status  - Monitor labs   - Assess for incontinence   - Turn and reposition patient  - Assist with mobility/ambulation  - Relieve pressure over bony prominences  - Avoid friction and shearing  - Provide appropriate hygiene as needed including keeping skin clean and dry  - Evaluate need for skin moisturizer/barrier cream  - Collaborate with interdisciplinary team   - Patient/family teaching  - Consider wound care consult   Outcome: Progressing     Problem: Nutrition/Hydration-ADULT  Goal: Nutrient/Hydration intake appropriate for improving, restoring or maintaining nutritional needs  Description: Monitor and assess patient's nutrition/hydration status for malnutrition. Collaborate with interdisciplinary team and initiate plan and interventions as ordered.  Monitor patient's weight and dietary intake as ordered or per policy. Utilize nutrition screening tool and intervene as necessary. Determine patient's food preferences and provide high-protein, high-caloric foods as appropriate.     INTERVENTIONS:  - Monitor oral intake, urinary output, labs, and treatment plans  - Assess nutrition and hydration status and recommend course of action  - Evaluate amount of meals eaten  - Assist patient with eating if necessary   - Allow adequate time for meals  -  Recommend/ encourage appropriate diets, oral nutritional supplements, and vitamin/mineral supplements  - Order, calculate, and assess calorie counts as needed  - Recommend, monitor, and adjust tube feedings and TPN/PPN based on assessed needs  - Assess need for intravenous fluids  - Provide specific nutrition/hydration education as appropriate  - Include patient/family/caregiver in decisions related to nutrition  Outcome: Progressing

## 2024-05-14 NOTE — PROGRESS NOTES
NEPHROLOGY HOSPITAL PROGRESS NOTE   Jerod Worthington 63 y.o. male MRN: [id number]  Unit/Bed#: S -01 Encounter: [id number]  Reason for Consult: CEE on CKD    ASSESSMENT and PLAN:  63-year-old male with history of CKD stage IV, diabetes, obesity, hypertension presented with shortness of breath.  Nephrology consulted for CEE.    1.  CEE on CKD likely progressed to ESRD.  Baseline creatinine 1.8-2.0 since 9980-3708.  No labs in 2023.  Admission creatinine 7.25.  Peak creatinine 9.4 on 05/07.  Status post initiation of dialysis on 05/07 due to hyperkalemia.  Access is right IJ permacath.  Serum/urine immunofixation no monoclonal immunoglobulin, KL ratio mildly elevated at 1.72.  We will maintain TTS schedule for dialysis.  Hemodialysis today.    2.  CKD stage IV.  Baseline creatinine 1.8-2.0 since 2020 1/2/2022.  No labs in 2023.  Etiology secondary to diabetic kidney disease, obesity related hyperfiltration.  He also has nephrotic range proteinuria.    3.  Hyperphosphatemia.  Serum phosphorus level 5.1 on 05/11.  Continue low phosphorus diet.  No need for phosphate binders.    4.  Volume overload.  Chest x-ray from 05/03 with severe pulmonary edema with small bilateral pleural effusions.  Echocardiogram with LVEF 60%, moderate MR. Continue ultrafiltration with dialysis.    5.  Anemia in CKD.  Hemoglobin today 9.3.  Ferritin 58/iron saturation 16%.  Start IV Venofer 100 mg over dialysis to complete 1 g total and then transition to 100 mg every week.  CHIDI can be started as outpatient.    6.  Hypertension.  Pressures currently acceptable.  Continue metoprolol 100 mg twice daily.  Continue torsemide 40 mg on nondialysis days.    7.  Flank pain.  Resolved this morning.  Urinalysis showing 2-4 RBC, 2-4 WBC, 0-3 hyaline casts.  Kidney ultrasound has been obtained and results are pending.    Discussed with internal medicine team.  After discussion, we agreed to continue dialysis on TTS schedule and to provide IV iron for iron  deficiency anemia with dialysis.    SUBJECTIVE / 24H INTERVAL HISTORY:  Patient seen and examined this morning.  Denies flank pain.  Denies dyspnea.    OBJECTIVE:  Current Weight: Weight - Scale: 96 kg (211 lb 10.3 oz)  Vitals:    05/13/24 2040 05/13/24 2304 05/14/24 0500 05/14/24 0600   BP: 146/83 152/85     BP Location:       Pulse: 77 74     Resp:  18     Temp:  98.1 °F (36.7 °C)     TempSrc:       SpO2: 98% 99%     Weight:   96 kg (211 lb 10.3 oz) 96 kg (211 lb 10.3 oz)   Height:           Intake/Output Summary (Last 24 hours) at 5/14/2024 0643  Last data filed at 5/13/2024 1801  Gross per 24 hour   Intake 480 ml   Output 375 ml   Net 105 ml     Review of Systems   Constitutional:  Negative for chills and fever.   HENT:  Negative for ear pain and sore throat.    Eyes:  Negative for pain and visual disturbance.   Respiratory:  Negative for cough and shortness of breath.    Cardiovascular:  Negative for chest pain and palpitations.   Gastrointestinal:  Negative for abdominal pain and vomiting.   Genitourinary:  Negative for dysuria and hematuria.   Musculoskeletal:  Negative for arthralgias and back pain.   Skin:  Negative for color change and rash.   Neurological:  Negative for seizures and syncope.   All other systems reviewed and are negative.    Physical Exam  Vitals and nursing note reviewed.   Constitutional:       General: He is not in acute distress.     Appearance: He is well-developed.   HENT:      Head: Normocephalic and atraumatic.   Eyes:      Conjunctiva/sclera: Conjunctivae normal.   Cardiovascular:      Rate and Rhythm: Normal rate and regular rhythm.      Heart sounds: No murmur heard.     Comments: Right chest wall permacath present  Pulmonary:      Effort: Pulmonary effort is normal. No respiratory distress.      Breath sounds: Normal breath sounds.   Abdominal:      Palpations: Abdomen is soft.      Tenderness: There is no abdominal tenderness.   Musculoskeletal:         General: No swelling.       Cervical back: Neck supple.      Right lower leg: No edema.      Left lower leg: No edema.   Skin:     General: Skin is warm and dry.      Capillary Refill: Capillary refill takes less than 2 seconds.   Neurological:      Mental Status: He is alert.   Psychiatric:         Mood and Affect: Mood normal.       Medications:    Current Facility-Administered Medications:     acetaminophen (TYLENOL) tablet 650 mg, 650 mg, Oral, Q6H PRN, Ronna Moncadause, DO, 650 mg at 05/05/24 0753    apixaban (ELIQUIS) tablet 5 mg, 5 mg, Oral, BID, Ronna Moncadause, DO, 5 mg at 05/13/24 1805    chlorhexidine (PERIDEX) 0.12 % oral rinse 15 mL, 15 mL, Mouth/Throat, Q12H BOBBI, Ronna Lucila Brouse, DO, 15 mL at 05/13/24 2039    insulin lispro (HumALOG/ADMELOG) 100 units/mL subcutaneous injection 1-6 Units, 1-6 Units, Subcutaneous, TID AC, 1 Units at 05/13/24 1624 **AND** Fingerstick Glucose (POCT), , , TID AC, Ronna Moncadause, DO    metoprolol tartrate (LOPRESSOR) tablet 100 mg, 100 mg, Oral, Q12H BOBBI, Ronna Lucila Brouse, DO, 100 mg at 05/13/24 2040    polyethylene glycol (MIRALAX) packet 17 g, 17 g, Oral, Daily, Araceli Castorena MD, 17 g at 05/13/24 0908    senna (SENOKOT) tablet 17.2 mg, 2 tablet, Oral, Daily, Araceli Castorena MD, 17.2 mg at 05/13/24 0908    torsemide (DEMADEX) tablet 40 mg, 40 mg, Oral, Every Other Day, Brian De Guzman MD    trimethobenzamide (TIGAN) IM injection 200 mg, 200 mg, Intramuscular, Q6H PRN, Ronna Moncadause, DO    Laboratory Results:  Results from last 7 days   Lab Units 05/14/24  0516 05/13/24  0501 05/11/24  1022 05/10/24  0550 05/09/24  0621 05/08/24  0524 05/07/24  2209 05/07/24  1806 05/07/24 1806 05/07/24  1658   WBC Thousand/uL 7.36 6.28 6.12  --  6.46 8.10  --   --   --  8.36   HEMOGLOBIN g/dL 9.3* 9.3* 9.3*  --  9.6* 9.0*  --   --   --  9.8*   HEMATOCRIT % 28.3* 28.4* 28.4*  --  30.4* 27.4*  --   --   --  30.2*   PLATELETS Thousands/uL 219 221  "235  --  242 245  --   --   --  263   POTASSIUM mmol/L 3.8 3.8 4.9 4.1 4.8 4.4 5.4*   < > 7.3*  --    CHLORIDE mmol/L 103 102 102 102 102 101 101   < > 101  --    CO2 mmol/L 25 29 29 28 26 25 18*   < > 19*  --    BUN mg/dL 51* 42* 47* 38* 52* 54* 84*   < > 80*  --    CREATININE mg/dL 6.62* 6.11* 6.89* 6.20* 7.36* 7.49* 9.44*   < > 9.02*  --    CALCIUM mg/dL 8.1* 8.1* 7.9* 8.0* 8.4 8.4 8.5   < > 8.7  --    MAGNESIUM mg/dL  --   --   --   --  2.1 2.1  --   --  2.4  --    PHOSPHORUS mg/dL  --   --  5.1*  --   --  5.8*  --   --  5.9*  --     < > = values in this interval not displayed.       Portions of the record may have been created with voice recognition software. Occasional wrong word or \"sound a like\" substitutions may have occurred due to the inherent limitations of voice recognition software. Read the chart carefully and recognize, using context, where substitutions have occurred. If you have any questions, please contact the dictating provider.    "

## 2024-05-14 NOTE — ASSESSMENT & PLAN NOTE
POA  Patient with history of stage IIIb CKD  Previously known baseline creatinine of 1.9-2     Recent Labs     05/13/24  0501 05/14/24  0516   CREATININE 6.11* 6.62*   EGFR 9 8       Estimated Creatinine Clearance: 12.2 mL/min (A) (by C-G formula based on SCr of 6.62 mg/dL (H)).    -Patient placed on Bumex drip 2mg/hr per nephrology with urine output of 5,175 over 24h by the morning of 5/6  -Sodium bicarb decreased to 325mg BID on 5/6, as acid-base balance improved  -Nephrology decreased Bumex drip to 1mg/hr on 5/6, with  over 24h by the morning of 5/7  -Urine output decreased from previous day, while on Bumex drip    Plan:  Continue oral diuretics of torsemide per nephrology on nondialysis days  Continue dialysis Monday Wednesday Friday while inpatient case management notes a chair has been located for Tuesday Thursday Saturday on discharge  Discussed with nephrology and medically stable for DC.  Await insurance Auth for rehab placement.  Physical therapy therapy recommended rehab

## 2024-05-14 NOTE — ASSESSMENT & PLAN NOTE
Home regimen of Lantus 20 units at lunch and at bedtime    Lab Results   Component Value Date    HGBA1C 7.8 (H) 05/03/2024     Recent Labs     05/13/24  1104 05/13/24  1613 05/14/24  0702 05/14/24  1048   POCGLU 151* 182* 123 161*       Blood Sugar Average: Last 72 hrs:  (P) [id number]     Latest Reference Range & Units 05/10/24 07:20 05/10/24 11:17 05/10/24 16:12 05/10/24 20:45 05/11/24 07:13   POC Glucose 65 - 140 mg/dl 96 149 (H) 142 (H) 150 (H) 110     Controlled well with current regimen of Lantus and lispro

## 2024-05-14 NOTE — QUICK NOTE
HEMODIALYSIS PROCEDURE NOTE  The patient was seen and examined on hemodialysis.  Patient tolerating procedure with stable hemodynamics.  Discussed with dialysis nurse at bedside and we agreed to proceed with UF 2.5L.   Time: 4 hours  Sodium: 138 Blood flow: 350   Dialyzer: F160 Potassium: 4K Dialysate flow: 500   Access: R IJV permcath Bicarbonate: 35 Ultrafiltration goal: 2.5L   Medications on HD: Venofer 100 mg iv

## 2024-05-14 NOTE — UTILIZATION REVIEW
Continued Stay Review    Date: 5/14/24                          Current Patient Class:  IP   Current Level of Care:  MS     HPI:63 y.o. male initially admitted on 5/4/24. New onset A flutter .  CEE on CKD likely progressed to ESRD.Pt initially on Bumex drip.  Hypertensive emergency     Assessment/Plan: 5/14     Admission creatinine 7.25.  Peak creatinine 9.4 on 05/07.  Status post initiation of dialysis on 05/07 due to hyperkalemia.  Access is right IJ permacath.   Serum/urine immunofixation no monoclonal immunoglobulin, KL ratio mildly elevated at 1.72.  Maintain TTS schedule for dialysis.  Hemodialysis today, UF . R chest wall permacath . Start IV Venofer 100 mg over dialysis to complete 1 g total and then transition to 100 mg every week. CHIDI can be started as outpatient. BP's acceptable . Continue torsemide 40 mg on nondialysis days. Flank pain started yesterday Ordered UA and US . Urinalysis showing 2-4 RBC, 2-4 WBC, 0-3 hyaline casts.  Kidney ultrasound 5/13 shows non obstructive bilat renal calculi.Flank pain is resolved this morning.    Continue anticoagulation with Eliquis, and rate control with Cardizem and metoprolol.   Vital Signs:   Date/Time Temp Pulse Resp BP MAP (mmHg) SpO2   05/14/24 1214 -- 73 18 168/90 116 97 %   05/14/24 1210 98.1 °F (36.7 °C) 74 18 164/90 115 98 %   05/14/24 0906 -- -- -- -- -- --   05/14/24 07:03:50 98.1 °F (36.7 °C) 74 18 149/73 98 97 %   05/13/24 23:04:01 98.1 °F (36.7 °C) 74 18 152/85 107 99 %   05/13/24 2238 -- -- -- -- -- --   05/13/24 20:40:08 -- 77 -- 146/83 104 98 %   05/13/24 2040 -- 77 -- 146/83 -- --   05/13/24 14:54:59 98.1 °F (36.7 °C) 77 18 141/86 104 100 %   05/13/24 07:38:17 98.6 °F (37 °C) 76 16 141/78 99 98 %   05/12/24 22:58:15 98 °F (36.7 °C) 74 18 137/70 92 98 %   05/12/24 22:03:39 98.6 °F (37 °C) 74 -- 115/74 88 97 %   05/12/24 2201 -- -- -- -- -- --   05/12/24 15:31:35 98.4 °F (36.9 °C) 74 18 162/89 113 100 %       Pertinent Labs/Diagnostic Results:    5/7  ECG- Atrial flutter with variable A-V block  Right bundle branch block  Left anterior fascicular block   Bifascicular block   US kidney and bladder   Final Result by Efrain Edmondson MD (05/14 0914)      Bilateral nonobstructive renal calculi with the largest measuring 4 mm in the upper pole of the right kidney.            Resident: ELOY LR I, the attending radiologist, have reviewed the images and agree with the final report above.      Workstation performed: [id number]         XR chest portable ICU   Final Result by Boy Gerber MD (05/08 1329)      Satisfactory positioning of the central venous catheter with the tip near the caval atrial junction.      Marked improvement of pulmonary edema compared with 5/3/2024 with only mild edema remaining at this time. Questionable small effusions.      No pneumothorax seen            Workstation performed: [id number]         IR tunneled dialysis catheter placement   Final Result by Neil Eller MD (05/07 1628)   Impression: Successful image guided placement of tunneled central venous hemodialysis catheter         Workstation performed: [id number]         CT head wo contrast   Final Result by Vladimir Bello MD (05/06 1920)      No acute intracranial hemorrhage or large mass effect. If there is concern for acute infarct, MRI is a more sensitive examination.                  Workstation performed: [id number]         CT abdomen pelvis wo contrast   Final Result by Bradley Landon Kocher, MD (05/05 0832)      1. No obstructive uropathy. Bilateral nonobstructing renal calculi measuring up to 8 mm at the lower pole right kidney.   2. Small bilateral pleural effusions with adjacent compressive atelectasis.      Workstation performed: [id number]         XR chest 2 views   Final Result by Linda Aj MD (05/04 0932)      Severe pulmonary edema with small effusions.            Workstation performed: [id number]                    Results from last 7 days   Lab Units 05/14/24  0516 05/13/24  0501 05/11/24  1022 05/09/24 0621 05/08/24 0524   WBC Thousand/uL 7.36 6.28 6.12 6.46 8.10   HEMOGLOBIN g/dL 9.3* 9.3* 9.3* 9.6* 9.0*   HEMATOCRIT % 28.3* 28.4* 28.4* 30.4* 27.4*   PLATELETS Thousands/uL 219 221 235 242 245   TOTAL NEUT ABS Thousands/µL  --  3.81  --  3.91 5.70         Results from last 7 days   Lab Units 05/14/24  0516 05/13/24  0501 05/11/24  1022 05/10/24  0550 05/09/24  0621 05/08/24  0524 05/07/24 2209 05/07/24  1806 05/07/24  1658   SODIUM mmol/L 138 139 136 137 136 136   < > 132*  --    POTASSIUM mmol/L 3.8 3.8 4.9 4.1 4.8 4.4   < > 7.3*  --    CHLORIDE mmol/L 103 102 102 102 102 101   < > 101  --    CO2 mmol/L 25 29 29 28 26 25   < > 19*  --    ANION GAP mmol/L 10 8 5 7 8 10   < > 12  --    BUN mg/dL 51* 42* 47* 38* 52* 54*   < > 80*  --    CREATININE mg/dL 6.62* 6.11* 6.89* 6.20* 7.36* 7.49*   < > 9.02*  --    EGFR ml/min/1.73sq m 8 9 7 8 7 7   < > 5  --    CALCIUM mg/dL 8.1* 8.1* 7.9* 8.0* 8.4 8.4   < > 8.7  --    CALCIUM, IONIZED mmol/L  --   --   --   --   --  1.07*  --   --  1.03*   MAGNESIUM mg/dL  --   --   --   --  2.1 2.1  --  2.4  --    PHOSPHORUS mg/dL  --   --  5.1*  --   --  5.8*  --  5.9*  --     < > = values in this interval not displayed.     Results from last 7 days   Lab Units 05/10/24  0550 05/07/24  1806   AST U/L  --  15   ALT U/L  --  17   ALK PHOS U/L  --  125*   TOTAL PROTEIN g/dL 5.9* 7.0   ALBUMIN g/dL  --  3.2*   TOTAL BILIRUBIN mg/dL  --  0.29     Results from last 7 days   Lab Units 05/14/24  1048 05/14/24  0702 05/13/24  1613 05/13/24  1104 05/13/24  0738 05/12/24  2113 05/12/24  1604 05/12/24  1222 05/12/24  0715 05/11/24  2107 05/11/24  1616 05/11/24  1112   POC GLUCOSE mg/dl 161* 123 182* 151* 129 206* 115 133 134 186* 128 145*     Results from last 7 days   Lab Units 05/14/24  0516 05/13/24  0501 05/11/24  1022 05/10/24  0550 05/09/24  0621 05/08/24  0524 05/07/24  2209 05/07/24  1806    GLUCOSE RANDOM mg/dL 119 136 135 101 153* 98 156* 133           Results from last 7 days   Lab Units 05/07/24  1658   PROTIME seconds 17.4*   INR  1.35*             Results from last 7 days   Lab Units 05/07/24  1658   LACTIC ACID mmol/L 1.3                             Results from last 7 days   Lab Units 05/07/24  2209   HEP B S AG  Non-reactive   HEP C AB  Non-reactive   HEP B C IGM  Non-reactive   HEP B C TOTAL AB  Non-reactive                     Results from last 7 days   Lab Units 05/13/24  2007 05/10/24  0618   CLARITY UA  Clear Clear   COLOR UA  Light Yellow Light Yellow   SPEC GRAV UA  1.014 1.015   PH UA  6.5 7.0   GLUCOSE UA mg/dl 100 (1/10%)* Negative   KETONES UA mg/dl Negative Negative   BLOOD UA  Trace* Negative   PROTEIN UA mg/dl 300 (3+)* 100 (2+)*   NITRITE UA  Negative Negative   BILIRUBIN UA  Negative Negative   UROBILINOGEN UA E.U./dl  --  0.2   UROBILINOGEN UA (BE) mg/dl <2.0  --    LEUKOCYTES UA  Negative Negative   WBC UA /hpf 2-4* 1-2   RBC UA /hpf 2-4* 1-2   BACTERIA UA /hpf None Seen None Seen   EPITHELIAL CELLS WET PREP /hpf Occasional Occasional             Medications:   Scheduled Medications:  apixaban, 5 mg, Oral, BID  chlorhexidine, 15 mL, Mouth/Throat, Q12H BOBBI  insulin lispro, 1-6 Units, Subcutaneous, TID AC  iron sucrose, 100 mg, Intravenous, Once per day on Tuesday Thursday Saturday- start 5/14   metoprolol tartrate, 100 mg, Oral, Q12H [facility]  polyethylene glycol, 17 g, Oral, Daily  senna, 2 tablet, Oral, Daily  torsemide, 40 mg, Oral, Every Other Day      Continuous IV Infusions:     PRN Meds:  acetaminophen, 650 mg, Oral, Q6H PRN  trimethobenzamide, 200 mg, Intramuscular, Q6H PRN        Discharge Plan: D    Network Utilization Review Department  ATTENTION: Please call with any questions or concerns to [phone] and carefully listen to the prompts so that you are directed to the right person. All voicemails are confidential.   For Discharge needs, contact Care Management [place]  Support Team at [phone] opt. 2  Send all requests for admission clinical reviews, approved or denied determinations and any other requests to dedicated fax number below belonging to the campus where the patient is receiving treatment. List of dedicated fax numbers for the Facilities:  FACILITY NAME UR FAX NUMBER   ADMISSION DENIALS (Administrative/Medical Necessity) [phone]   DISCHARGE SUPPORT TEAM (NETWORK) [phone]   PARENT CHILD HEALTH (Maternity/NICU/Pediatrics) [phone]   [facility] [phone]   [facility] [phone]   [facility] [phone]   [facility] [phone]   [facility] [phone]   [facility] [phone]   [facility] [phone]   [facility] [phone]   [facility] [phone]   [facility] [phone]   [facility] [phone]   [facility] [phone]

## 2024-05-14 NOTE — CASE MANAGEMENT
Case Management Discharge Planning Note    Patient name Jerod Worthington  Location S /S -01 MRN [id number]  : 1960 Date 2024       Current Admission Date: 5/3/2024  Current Admission Diagnosis:Acute renal failure superimposed on stage 3 chronic kidney disease (HCC)   Patient Active Problem List    Diagnosis Date Noted    Ambulatory dysfunction 2024    Sinus pause 2024    Atrial flutter (HCC) 2024    Scalp abscess 2024    Hypertensive emergency 2024    Stage 3b chronic kidney disease (HCC) 2022    Hyperlipidemia 2022    Acute renal failure superimposed on stage 3 chronic kidney disease (HCC) 2022    Chronic right-sided low back pain with right-sided sciatica 2021    Severe obstructive sleep apnea 10/18/2017    Type 2 diabetes mellitus with hyperglycemia, with long-term current use of insulin (HCC) 2012    History of urinary stone 2012    Essential hypertension 2012      LOS (days): 10  Geometric Mean LOS (GMLOS) (days): 6.1  Days to GMLOS:-4.5     OBJECTIVE:  Risk of Unplanned Readmission Score: 15.06         Current admission status: Inpatient   Preferred Pharmacy:   UNKNOWN - FOLLOW UP PRIOR TO DISCHARGE TO E-PRESCRIBE  No address on file      Primary Care Provider: No primary care provider on file.    Primary Insurance: "[employer] RACHEL JUAREZ  Secondary Insurance:     DISCHARGE DETAILS:                                                                                                               Facility Insurance Auth Number: [id number]

## 2024-05-14 NOTE — PLAN OF CARE
Problem: PAIN - ADULT  Goal: Verbalizes/displays adequate comfort level or baseline comfort level  Description: Interventions:  - Encourage patient to monitor pain and request assistance  - Assess pain using appropriate pain scale  - Administer analgesics based on type and severity of pain and evaluate response  - Implement non-pharmacological measures as appropriate and evaluate response  - Consider cultural and social influences on pain and pain management  - Notify physician/advanced practitioner if interventions unsuccessful or patient reports new pain  Outcome: Progressing     Problem: INFECTION - ADULT  Goal: Absence or prevention of progression during hospitalization  Description: INTERVENTIONS:  - Assess and monitor for signs and symptoms of infection  - Monitor lab/diagnostic results  - Monitor all insertion sites, i.e. indwelling lines, tubes, and drains  - Monitor endotracheal if appropriate and nasal secretions for changes in amount and color  - [place] appropriate cooling/warming therapies per order  - Administer medications as ordered  - Instruct and encourage patient and family to use good hand hygiene technique  - Identify and instruct in appropriate isolation precautions for identified infection/condition  Outcome: Progressing  Goal: Absence of fever/infection during neutropenic period  Description: INTERVENTIONS:  - Monitor WBC    Outcome: Progressing     Problem: SAFETY ADULT  Goal: Patient will remain free of falls  Description: INTERVENTIONS:  - Educate patient/family on patient safety including physical limitations  - Instruct patient to call for assistance with activity   - Consult OT/PT to assist with strengthening/mobility   - Keep Call bell within reach  - Keep bed low and locked with side rails adjusted as appropriate  - Keep care items and personal belongings within reach  - Initiate and maintain comfort rounds  - Make Fall Risk Sign visible to staff  - Offer Toileting every 2 Hours,  in advance of need  - Initiate/Maintain bed/chair alarm  - Obtain necessary fall risk management equipment  - Apply yellow socks and bracelet for high fall risk patients  - Consider moving patient to room near nurses station  Outcome: Progressing  Goal: Maintain or return to baseline ADL function  Description: INTERVENTIONS:  -  Assess patient's ability to carry out ADLs; assess patient's baseline for ADL function and identify physical deficits which impact ability to perform ADLs (bathing, care of mouth/teeth, toileting, grooming, dressing, etc.)  - Assess/evaluate cause of self-care deficits   - Assess range of motion  - Assess patient's mobility; develop plan if impaired  - Assess patient's need for assistive devices and provide as appropriate  - Encourage maximum independence but intervene and supervise when necessary  - Involve family in performance of ADLs  - Assess for home care needs following discharge   - Consider OT consult to assist with ADL evaluation and planning for discharge  - Provide patient education as appropriate  Outcome: Progressing  Goal: Maintains/Returns to pre admission functional level  Description: INTERVENTIONS:  - Perform AM-PAC 6 Click Basic Mobility/ Daily Activity assessment daily.  - Set and communicate daily mobility goal to care team and patient/family/caregiver.   - Collaborate with rehabilitation services on mobility goals if consulted  - Perform Range of Motion 3 times a day.  - Reposition patient every 2 hours.  - Dangle patient 3 times a day  - Stand patient 3 times a day  - Ambulate patient 3 times a day  - Out of bed to chair 3 times a day   - Out of bed for meals 3 times a day  - Out of bed for toileting  - Record patient progress and toleration of activity level   Outcome: Progressing     Problem: DISCHARGE PLANNING  Goal: Discharge to home or other facility with appropriate resources  Description: INTERVENTIONS:  - Identify barriers to discharge w/patient and  caregiver  - Arrange for needed discharge resources and transportation as appropriate  - Identify discharge learning needs (meds, wound care, etc.)  - Arrange for interpretive services to assist at discharge as needed  - Refer to Case Management Department for coordinating discharge planning if the patient needs post-hospital services based on physician/advanced practitioner order or complex needs related to functional status, cognitive ability, or social support system  Outcome: Progressing     Problem: Knowledge Deficit  Goal: Patient/family/caregiver demonstrates understanding of disease process, treatment plan, medications, and discharge instructions  Description: Complete learning assessment and assess knowledge base.  Interventions:  - Provide teaching at level of understanding  - Provide teaching via preferred learning methods  Outcome: Progressing     Problem: METABOLIC, FLUID AND ELECTROLYTES - ADULT  Goal: Electrolytes maintained within normal limits  Description: INTERVENTIONS:  - Monitor labs and assess patient for signs and symptoms of electrolyte imbalances  - Administer electrolyte replacement as ordered  - Monitor response to electrolyte replacements, including repeat lab results as appropriate  - Instruct patient on fluid and nutrition as appropriate  Outcome: Progressing  Goal: Fluid balance maintained  Description: INTERVENTIONS:  - Monitor labs   - Monitor I/O and WT  - Instruct patient on fluid and nutrition as appropriate  - Assess for signs & symptoms of volume excess or deficit  Outcome: Progressing     Problem: SAFETY,RESTRAINT: NV/NON-SELF DESTRUCTIVE BEHAVIOR  Goal: Remains free of harm/injury (restraint for non violent/non self-detsructive behavior)  Description: INTERVENTIONS:  - Instruct patient/family regarding restraint use   - Assess and monitor physiologic and psychological status   - Provide interventions and comfort measures to meet assessed patient needs   - Identify and  implement measures to help patient regain control  - Assess readiness for release of restraint   Outcome: Progressing  Goal: Returns to optimal restraint-free functioning  Description: INTERVENTIONS:  - Assess the patient's behavior and symptoms that indicate continued need for restraint  - Identify and implement measures to help patient regain control  - Assess readiness for release of restraint   Outcome: Progressing     Problem: Prexisting or High Potential for Compromised Skin Integrity  Goal: Skin integrity is maintained or improved  Description: INTERVENTIONS:  - Identify patients at risk for skin breakdown  - Assess and monitor skin integrity  - Assess and monitor nutrition and hydration status  - Monitor labs   - Assess for incontinence   - Turn and reposition patient  - Assist with mobility/ambulation  - Relieve pressure over bony prominences  - Avoid friction and shearing  - Provide appropriate hygiene as needed including keeping skin clean and dry  - Evaluate need for skin moisturizer/barrier cream  - Collaborate with interdisciplinary team   - Patient/family teaching  - Consider wound care consult   Outcome: Progressing     Problem: Nutrition/Hydration-ADULT  Goal: Nutrient/Hydration intake appropriate for improving, restoring or maintaining nutritional needs  Description: Monitor and assess patient's nutrition/hydration status for malnutrition. Collaborate with interdisciplinary team and initiate plan and interventions as ordered.  Monitor patient's weight and dietary intake as ordered or per policy. Utilize nutrition screening tool and intervene as necessary. Determine patient's food preferences and provide high-protein, high-caloric foods as appropriate.     INTERVENTIONS:  - Monitor oral intake, urinary output, labs, and treatment plans  - Assess nutrition and hydration status and recommend course of action  - Evaluate amount of meals eaten  - Assist patient with eating if necessary   - Allow  adequate time for meals  - Recommend/ encourage appropriate diets, oral nutritional supplements, and vitamin/mineral supplements  - Order, calculate, and assess calorie counts as needed  - Recommend, monitor, and adjust tube feedings and TPN/PPN based on assessed needs  - Assess need for intravenous fluids  - Provide specific nutrition/hydration education as appropriate  - Include patient/family/caregiver in decisions related to nutrition  Outcome: Progressing     Problem: Potential for Falls  Goal: Patient will remain free of falls  Description: INTERVENTIONS:  - Educate patient/family on patient safety including physical limitations  - Instruct patient to call for assistance with activity   - Consult OT/PT to assist with strengthening/mobility   - Keep Call bell within reach  - Keep bed low and locked with side rails adjusted as appropriate  - Keep care items and personal belongings within reach  - Initiate and maintain comfort rounds  - Make Fall Risk Sign visible to staff  - Offer Toileting every 2 Hours, in advance of need  - Initiate/Maintain bed/chair alarm  - Obtain necessary fall risk management equipment  - Apply yellow socks and bracelet for high fall risk patients  - Consider moving patient to room near nurses station  Outcome: Progressing

## 2024-05-14 NOTE — ASSESSMENT & PLAN NOTE
Presented with chest pain and shortness of breath  No home medications reported  Meeting criteria for hypertensive emergency in ED with BP of 193/120  Nitroglycerin was administered in the ED, with improvement in BP  S/p Bumex drip now on torsemide    Plan:  Continue to monitor blood pressure 141/86  Controlled with metoprolol.  Add amlodipine today.  Continue with torsemide on nondialysis day.  Appreciate cardiology recommendations  Blood pressure elevated despite hemodialysis.

## 2024-05-14 NOTE — ASSESSMENT & PLAN NOTE
Seen by physical therapy and Occupational Therapy, recommendations for rehab.  Family meeting noted May 10 and all in agreement with blanket referrals.  Appreciate case management support  Await rehab placement

## 2024-05-14 NOTE — ASSESSMENT & PLAN NOTE
Elevated blood pressure noted today.  Continue with metoprolol 100 mg twice daily and torsemide on nondialysis days.  Will add amlodipine today.  Need further titration of antihypertensive medications.

## 2024-05-14 NOTE — PHYSICAL THERAPY NOTE
Physical Therapy Cancellation Note       05/14/24 1135   PT Last Visit   PT Visit Date 05/14/24   Note Type   Note Type Cancelled Session   Cancel Reasons Patient off floor/hemodialysis   Subjective   Subjective Session cancelled due to pt being in dyalisis.     Tamra Rao, PTA

## 2024-05-14 NOTE — CASE MANAGEMENT
Case Management Discharge Planning Note    Patient name Jerod Worthington  Location S /S -01 MRN [id number]  : 1960 Date 2024       Current Admission Date: 5/3/2024  Current Admission Diagnosis:Acute renal failure superimposed on stage 3 chronic kidney disease (HCC)   Patient Active Problem List    Diagnosis Date Noted    Ambulatory dysfunction 2024    Sinus pause 2024    Atrial flutter (HCC) 2024    Scalp abscess 2024    Hypertensive emergency 2024    Stage 3b chronic kidney disease (HCC) 2022    Hyperlipidemia 2022    Acute renal failure superimposed on stage 3 chronic kidney disease (HCC) 2022    Chronic right-sided low back pain with right-sided sciatica 2021    Severe obstructive sleep apnea 10/18/2017    Type 2 diabetes mellitus with hyperglycemia, with long-term current use of insulin (HCC) 2012    History of urinary stone 2012    Essential hypertension 2012      LOS (days): 10  Geometric Mean LOS (GMLOS) (days): 6.1  Days to GMLOS:-4.5     OBJECTIVE:  Risk of Unplanned Readmission Score: 15.06         Current admission status: Inpatient   Preferred Pharmacy:   UNKNOWN - FOLLOW UP PRIOR TO DISCHARGE TO E-PRESCRIBE  No address on file      Primary Care Provider: No primary care provider on file.    Primary Insurance: [employer] [facility]  Secondary Insurance:     DISCHARGE DETAILS:    Discharge planning discussed with:: Left VM for both daughters Erika & Kathi                                     Other Referral/Resources/Interventions Provided:  Interventions: Short Term Rehab  Referral Comments: Insurance authorization has been APPROVED for patient's admission to [facility]. As per provider, plan is to continue adjusting bp meds today to address high BP; Patient therefore stable for discharge tomorrow AM. Facility made aware -- CM arranged 10am p/u for tomorrow via BLS (patient is impulsive). CM also  left 2 VM's for daughter Kathi and Erika to make them aware. [facility]inney remains onboard for Thursday's start date at 10:15am, as per [facility] communication -- Daughter Kathi to provide transportation as previously discussed. MNC completed and placed in patient's chart for transport. CM will remain available.         Treatment Team Recommendation: Short Term Rehab  Discharge Destination Plan:: Short Term Rehab  Transport at Discharge : [facility]  Dispatcher Contacted: Yes     Transported by (Company and Unit #): [employer]  ETA of Transport (Date): 05/15/24  ETA of Transport (Time): 1000     Transfer Mode: Stretcher -- Patient is very impulsive.                      Accepting Facility Name, City & State : [facility]  Receiving Facility/Agency Phone Number: [phone]  Facility/Agency Fax Number: [phone]

## 2024-05-14 NOTE — PROGRESS NOTES
[facility]  Progress Note  Name: Jerod Worthington I  MRN: [id number]  Unit/Bed#: S -01 I Date of Admission: 5/3/2024   Date of Service: 5/14/2024 I Hospital Day: 10    Assessment/Plan   * Acute renal failure superimposed on stage 3 chronic kidney disease (HCC)  Assessment & Plan  POA  Patient with history of stage IIIb CKD  Previously known baseline creatinine of 1.9-2     Recent Labs     05/13/24  0501 05/14/24  0516   CREATININE 6.11* 6.62*   EGFR 9 8       Estimated Creatinine Clearance: 12.2 mL/min (A) (by C-G formula based on SCr of 6.62 mg/dL (H)).    -Patient placed on Bumex drip 2mg/hr per nephrology with urine output of 5,175 over 24h by the morning of 5/6  -Sodium bicarb decreased to 325mg BID on 5/6, as acid-base balance improved  -Nephrology decreased Bumex drip to 1mg/hr on 5/6, with  over 24h by the morning of 5/7  -Urine output decreased from previous day, while on Bumex drip    Plan:  Continue oral diuretics of torsemide per nephrology on nondialysis days  Continue dialysis Monday Wednesday Friday while inpatient case management notes a chair has been located for Tuesday Thursday Saturday on discharge  Discussed with nephrology and medically stable for DC.  Await insurance Auth for rehab placement.  Physical therapy therapy recommended rehab    Ambulatory dysfunction  Assessment & Plan  Seen by physical therapy and Occupational Therapy, recommendations for rehab.  Family meeting noted May 10 and all in agreement with blanket referrals.  Appreciate case management support  Await rehab placement    Sinus pause  Assessment & Plan  Status post atropine with rapid response called for 10-second pauses post permacath placement during hospitalization.  Cardiology support appreciated, continue metoprolol  No further reports of sinus pause    Atrial flutter (HCC)  Assessment & Plan  Patient with new onset atrial flutter.  Continue anticoagulation with Eliquis, and rate  control with Cardizem and metoprolol.    Appreciate cardiology support- they signed off as of today       Scalp abscess  Assessment & Plan  Patient was noted to have abscess on admissions that was drained and patient is s/p keflex x 5 days.    Hypertensive emergency  Assessment & Plan  Elevated blood pressure noted today.  Continue with metoprolol 100 mg twice daily and torsemide on nondialysis days.  Will add amlodipine today.  Need further titration of antihypertensive medications.    Essential hypertension  Assessment & Plan  Presented with chest pain and shortness of breath  No home medications reported  Meeting criteria for hypertensive emergency in ED with BP of 193/120  Nitroglycerin was administered in the ED, with improvement in BP  S/p Bumex drip now on torsemide    Plan:  Continue to monitor blood pressure 141/86  Controlled with metoprolol.  Add amlodipine today.  Continue with torsemide on nondialysis day.  Appreciate cardiology recommendations  Blood pressure elevated despite hemodialysis.    Type 2 diabetes mellitus with hyperglycemia, with long-term current use of insulin (HCC)  Assessment & Plan  Home regimen of Lantus 20 units at lunch and at bedtime    Lab Results   Component Value Date    HGBA1C 7.8 (H) 05/03/2024     Recent Labs     05/13/24  1104 05/13/24  1613 05/14/24  0702 05/14/24  1048   POCGLU 151* 182* 123 161*       Blood Sugar Average: Last 72 hrs:  (P) 146.3967199916833628     Latest Reference Range & Units 05/10/24 07:20 05/10/24 11:17 05/10/24 16:12 05/10/24 20:45 05/11/24 07:13   POC Glucose 65 - 140 mg/dl 96 149 (H) 142 (H) 150 (H) 110     Controlled well with current regimen of Lantus and lispro    Severe obstructive sleep apnea  Assessment & Plan  Hx of BOBBY with CPAP use at home  - patient endorse nightly use  Patient was started on BiPAP for respiratory distress in ED  Patient now off BiPAP    Plan:  Continue CPAP nightly               VTE Pharmacologic Prophylaxis: VTE Score:  4 High Risk (Score >/= 5) - Pharmacological DVT Prophylaxis Ordered: heparin. Sequential Compression Devices Ordered.    Mobility:   Basic Mobility Inpatient Raw Score: 16  JH-HLM Goal: 5: Stand one or more mins  JH-HLM Achieved: 7: Walk 25 feet or more  JH-HLM Goal achieved. Continue to encourage appropriate mobility.    Patient Centered Rounds: I performed bedside rounds with nursing staff today.   Discussions with Specialists or Other Care Team Provider: Discussed with nephrology    Education and Discussions with Family / Patient: Discussed with patient    Total Time Spent on Date of Encounter in care of patient: 20 mins. This time was spent on one or more of the following: performing physical exam; counseling and coordination of care; obtaining or reviewing history; documenting in the medical record; reviewing/ordering tests, medications or procedures; communicating with other healthcare professionals and discussing with patient's family/caregivers.    Current Length of Stay: 10 day(s)  Current Patient Status: Inpatient   Certification Statement: The patient will continue to require additional inpatient hospital stay due to await rehab placement  Discharge Plan: Anticipate discharge tomorrow to rehab facility.    Code Status: Level 1 - Full Code    Subjective:   Seen and examined at bedside.  S/p hemodialysis today.  Elevated blood pressure noted.  Denies any complaints of chest pain or shortness of breath.    Objective:     Vitals:   Temp (24hrs), Av.1 °F (36.7 °C), Min:98.1 °F (36.7 °C), Max:98.1 °F (36.7 °C)    Temp:  [98.1 °F (36.7 °C)] 98.1 °F (36.7 °C)  HR:  [73-77] 76  Resp:  [18] 18  BP: (141-186)/() 168/93  SpO2:  [97 %-100 %] 99 %  Body mass index is 35.94 kg/m².     Input and Output Summary (last 24 hours):     Intake/Output Summary (Last 24 hours) at 2024 1356  Last data filed at 2024 1216  Gross per 24 hour   Intake 680 ml   Output 100 ml   Net 580 ml       Physical Exam:    Physical Exam  Constitutional:       General: He is not in acute distress.  HENT:      Head: Normocephalic.      Nose: Nose normal.      Mouth/Throat:      Mouth: Mucous membranes are moist.   Eyes:      Extraocular Movements: Extraocular movements intact.      Pupils: Pupils are equal, round, and reactive to light.   Cardiovascular:      Rate and Rhythm: Normal rate and regular rhythm.   Pulmonary:      Effort: Pulmonary effort is normal.   Abdominal:      General: Abdomen is flat.   Musculoskeletal:      Right lower leg: No edema.      Left lower leg: No edema.   Skin:     General: Skin is warm.   Neurological:      General: No focal deficit present.      Mental Status: He is alert. Mental status is at baseline.          Additional Data:     Labs:  Results from last 7 days   Lab Units 05/14/24  0516 05/13/24  0501   WBC Thousand/uL 7.36 6.28   HEMOGLOBIN g/dL 9.3* 9.3*   HEMATOCRIT % 28.3* 28.4*   PLATELETS Thousands/uL 219 221   SEGS PCT %  --  60   LYMPHO PCT %  --  27   MONO PCT %  --  9   EOS PCT %  --  3     Results from last 7 days   Lab Units 05/14/24  0516 05/07/24  2209 05/07/24  1806   SODIUM mmol/L 138   < > 132*   POTASSIUM mmol/L 3.8   < > 7.3*   CHLORIDE mmol/L 103   < > 101   CO2 mmol/L 25   < > 19*   BUN mg/dL 51*   < > 80*   CREATININE mg/dL 6.62*   < > 9.02*   ANION GAP mmol/L 10   < > 12   CALCIUM mg/dL 8.1*   < > 8.7   ALBUMIN g/dL  --   --  3.2*   TOTAL BILIRUBIN mg/dL  --   --  0.29   ALK PHOS U/L  --   --  125*   ALT U/L  --   --  17   AST U/L  --   --  15   GLUCOSE RANDOM mg/dL 119   < > 133    < > = values in this interval not displayed.     Results from last 7 days   Lab Units 05/07/24  1658   INR  1.35*     Results from last 7 days   Lab Units 05/14/24  1048 05/14/24  0702 05/13/24  1613 05/13/24  1104 05/13/24  0738 05/12/24  2113 05/12/24  1604 05/12/24  1222 05/12/24  0715 05/11/24  2107 05/11/24  1616 05/11/24  1112   POC GLUCOSE mg/dl 161* 123 182* 151* 129 206* 115 133 134 186*  128 145*         Results from last 7 days   Lab Units 05/07/24  1658   LACTIC ACID mmol/L 1.3       Lines/Drains:  Invasive Devices       Peripheral Intravenous Line  Duration             Peripheral IV 05/13/24 Distal;Right;Upper;Ventral (anterior) Arm <1 day              Hemodialysis Catheter  Duration             HD Permanent Double Catheter 6 days                          Imaging: No pertinent imaging reviewed.    Recent Cultures (last 7 days):         Last 24 Hours Medication List:   Current Facility-Administered Medications   Medication Dose Route Frequency Provider Last Rate    acetaminophen  650 mg Oral Q6H PRN Ronna Hurtado DO      [START ON 5/15/2024] amLODIPine  5 mg Oral Daily Shauna Nicole MD      apixaban  5 mg Oral BID Ronna Hurtado DO      chlorhexidine  15 mL Mouth/Throat Q12H [facility] Ronna Hurtado DO      insulin lispro  1-6 Units Subcutaneous TID  Ronna Hurtado DO      iron sucrose  100 mg Intravenous Once per day on Tuesday Thursday Saturday Brian De Guzman MD      metoprolol tartrate  100 mg Oral Q12H [facility] Ronna Hurtado DO      polyethylene glycol  17 g Oral Daily Araceli Castorena MD      senna  2 tablet Oral Daily Araceli Castorena MD      torsemide  40 mg Oral Every Other Day Brian De Guzman MD      trimethobenzamide  200 mg Intramuscular Q6H PRN Ronna Hurtado DO          Today, Patient Was Seen By: Shauna Nicole MD    **Please Note: This note may have been constructed using a voice recognition system.**

## 2024-05-14 NOTE — PLAN OF CARE
Post-Dialysis RN Treatment Note    Blood Pressure:  Pre 164/90 mm/Hg  Post 148/89 mmHg   EDW  TBD kg    Weight:  Pre 95 kg   Post 92.6 kg   Mode of weight measurement: Standing Scale   Volume Removed  2502 ml    Treatment duration 240 minutes    NS given  No    Treatment shortened? No   Medications given during Rx None Reported   Estimated Kt/V  1.23   Access type: Permacath/TDC   Access Issues: Yes, describe: lines reversed mid-way due to poor arterial flow     Report called to primary nurse   Yes Rama Woo      Current hemodialysis plan of care is to remove a total of 3000 ml of fluid over a 4 hour treatment for a net of 2.5 liters as tolerated.  Monitor vital signs every 15 minutes while on treatment for patient safety.  Utilize a 4 K+ bath for serum potassium of 3.8 to maintain electrolyte balance.  Report received from Rama Woo.  Plan reviewed with Dr De Guzman via [employer].      Problem: METABOLIC, FLUID AND ELECTROLYTES - ADULT  Goal: Electrolytes maintained within normal limits  Description: INTERVENTIONS:  - Monitor labs and assess patient for signs and symptoms of electrolyte imbalances  - Administer electrolyte replacement as ordered  - Monitor response to electrolyte replacements, including repeat lab results as appropriate  - Instruct patient on fluid and nutrition as appropriate  Outcome: Progressing  Goal: Fluid balance maintained  Description: INTERVENTIONS:  - Monitor labs   - Monitor I/O and WT  - Instruct patient on fluid and nutrition as appropriate  - Assess for signs & symptoms of volume excess or deficit  Outcome: Progressing

## 2024-05-15 VITALS
BODY MASS INDEX: 34.74 KG/M2 | HEART RATE: 74 BPM | HEIGHT: 64 IN | DIASTOLIC BLOOD PRESSURE: 81 MMHG | OXYGEN SATURATION: 96 % | WEIGHT: 203.5 LBS | TEMPERATURE: 97.9 F | RESPIRATION RATE: 16 BRPM | SYSTOLIC BLOOD PRESSURE: 127 MMHG

## 2024-05-15 LAB — GLUCOSE SERPL-MCNC: 117 MG/DL (ref 65–140)

## 2024-05-15 PROCEDURE — 82948 REAGENT STRIP/BLOOD GLUCOSE: CPT

## 2024-05-15 PROCEDURE — 99239 HOSP IP/OBS DSCHRG MGMT >30: CPT | Performed by: INTERNAL MEDICINE

## 2024-05-15 PROCEDURE — 99232 SBSQ HOSP IP/OBS MODERATE 35: CPT | Performed by: INTERNAL MEDICINE

## 2024-05-15 RX ORDER — ACETAMINOPHEN 325 MG/1
650 TABLET ORAL EVERY 6 HOURS PRN
Start: 2024-05-15

## 2024-05-15 RX ORDER — POLYETHYLENE GLYCOL 3350 17 G/17G
17 POWDER, FOR SOLUTION ORAL DAILY
Start: 2024-05-16

## 2024-05-15 RX ORDER — TORSEMIDE 20 MG/1
40 TABLET ORAL EVERY OTHER DAY
Start: 2024-05-16

## 2024-05-15 RX ORDER — SENNOSIDES 8.6 MG
17.2 TABLET ORAL DAILY
Start: 2024-05-16

## 2024-05-15 RX ORDER — INSULIN LISPRO 100 [IU]/ML
1-6 INJECTION, SOLUTION INTRAVENOUS; SUBCUTANEOUS
Start: 2024-05-15

## 2024-05-15 RX ORDER — METOPROLOL TARTRATE 100 MG/1
100 TABLET ORAL EVERY 12 HOURS SCHEDULED
Start: 2024-05-15

## 2024-05-15 RX ORDER — AMLODIPINE BESYLATE 5 MG/1
5 TABLET ORAL DAILY
Start: 2024-05-16

## 2024-05-15 RX ADMIN — METOPROLOL TARTRATE 100 MG: 100 TABLET, FILM COATED ORAL at 08:52

## 2024-05-15 RX ADMIN — AMLODIPINE BESYLATE 5 MG: 5 TABLET ORAL at 08:52

## 2024-05-15 RX ADMIN — SENNOSIDES 17.2 MG: 8.6 TABLET, FILM COATED ORAL at 08:52

## 2024-05-15 RX ADMIN — CHLORHEXIDINE GLUCONATE 15 ML: 1.2 RINSE ORAL at 08:52

## 2024-05-15 RX ADMIN — POLYETHYLENE GLYCOL 3350 17 G: 17 POWDER, FOR SOLUTION ORAL at 08:52

## 2024-05-15 RX ADMIN — APIXABAN 5 MG: 5 TABLET, FILM COATED ORAL at 08:52

## 2024-05-15 NOTE — DISCHARGE SUMMARY
Discharge Summary - [facility] Internal Medicine    Patient Information: Jerod Worthington 63 y.o. male MRN: [id number]  Unit/Bed#: S -01 Encounter: [id number]    Discharging Physician / Practitioner: Neelam Geller MD  PCP: No primary care provider on file.  Admission Date: 5/3/2024  Discharge Date: 05/15/24    Reason for Admission: Shortness of Breath (Pt. Brought by EMS from home with c/o SOB+ chest pain. Flu like symptoms since today, cough + sneezing,  denies fever. Pt. Reports flying in from [place] yesterday. Has kidney problems as well. Diabetic.  )    Discharge Diagnoses:     Primary Discharge Diagnosis:     Principal Problem:    Acute renal failure superimposed on stage 3 chronic kidney disease (HCC)  Active Problems:    Severe obstructive sleep apnea    Type 2 diabetes mellitus with hyperglycemia, with long-term current use of insulin (HCC)    Essential hypertension    Hypertensive emergency    Scalp abscess    Atrial flutter (HCC)    Sinus pause    Ambulatory dysfunction  Resolved Problems:    * No resolved hospital problems. *    Acute Resp failure 2/2 vol overload.   Consultations During Hospital Stay:  IP CONSULT TO CARDIOLOGY  IP CONSULT TO NEPHROLOGY  INPATIENT CONSULT TO IR  IP CONSULT TO CASE MANAGEMENT  IP CONSULT TO CASE MANAGEMENT    Procedures Performed:   Permacath placement.  Significant Findings:   Refer to hospital course and above listed diagnosis related plan for details    Imaging while in hospital:  Ultrasound of the kidney and bladder.  Chest x-ray.  CT scan of the abdomen pelvis without contrast  Incidental Findings:   Test Results Pending at Discharge (will require follow up):   As per After Visit Summary     Outpatient Tests Requested:    Complications:  Refer to hospital course and above listed diagnosis related plan, if any    Hospital Course:     Jerod Worthington is a 63 y.o. male patient with PMHx of CKD stage IV, obesity, diabetes, hypertension, BOBBY who originally  "presented to the hospital on 5/3/2024 due to chief complaints of shortness of breath.  Patient was found to be in volume overload and CEE on CKD.  Patient was in cardiorenal syndrome.  With diuresis as well patient became oliguric and eventually required to be on dialysis.  Patient was seen by cardiology and nephrology in the hospital.  She was initially seen by critical care as well due to shortness of breath and hypoxia.  With  dialysis patient started to feel better.  Patient tolerated dialysis well.  He was then very deconditioned and recommended short-term rehab.  Patient was then discharged to short-term rehab.  However he will continue to get dialysis as an outpatient and was set up with outpatient dialysis.    Of note patient had a permacath placed and just after permacath placement patient had a 10-second sinus pause but was eval by cardiology and later on was stable.  No need for any intervention.  But patient was found to have atrial flutter which was newly diagnosed and was started on metoprolol and Eliquis.     Please see above list of diagnoses and related plan for additional information.       Condition at Discharge: fair     Discharge Day Visit / Exam:     Subjective:  I have seen and examined the patient at bedside. Overnight events reviewed with the RN.     Vitals: Blood Pressure: 127/81 (05/15/24 0703)  Pulse: 74 (05/15/24 0703)  Temperature: 97.9 °F (36.6 °C) (05/15/24 0703)  Temp Source: Oral (05/14/24 1620)  Respirations: 16 (05/15/24 0703)  Height: 5' 4\" (162.6 cm) (05/04/24 1100)  Weight - Scale: 92.3 kg (203 lb 8 oz) (05/15/24 0600)  SpO2: 96 % (05/15/24 0703)  Exam:   Physical Exam    General Exam: Alert and Oriented x 3, NAD  Eyes: SHAUNA  Neck: Supple.   CVS: S1, S2 Clarke, RRR.  Permacath on the right chest wall.  R/S: Clear to auscultate anteriorly.   Abd: Soft, NT, ND, BS+ve  Extremities: No edema noted.   Skin: No acute Rash noted.   CNS: No acute FND. Moves all 4 extremities.   Psych: " "Co-operative, Not agitated.       Discharge instructions/Information to patient and family:(Discharge Medications and Follow up):   See after visit summary for information provided to patient and family.      Provisions for Follow-Up Care:  See after visit summary for information related to follow-up care and any pertinent home health orders.      Disposition: Skilled nursing facility at  [facility]    Planned Readmission:  No     Discharge Statement:  I spent 35 minutes discharging the patient. This time was spent on the day of discharge. I had direct contact with the patient on the day of discharge. Greater than 50% of the total time was spent examining patient, answering all patient questions, arranging and discussing plan of care with patient as well as directly providing post-discharge instructions.  Additional time then spent on discharge activities.    Discharge Medications:  See after visit summary for reconciled discharge medications provided to patient and family.      ** Please Note: \"This note has been constructed using a voice recognition system.Therefore there may be syntax, spelling, and/or grammatical errors. Please call if you have any questions. \"**            "

## 2024-05-15 NOTE — UTILIZATION REVIEW
NOTIFICATION OF ADMISSION DISCHARGE   This is a Notification of Discharge from [facility]. Please be advised that this patient has been discharge from our facility. Below you will find the admission and discharge date and time including the patient’s disposition.   UTILIZATION REVIEW CONTACT:  Jesica De Leon  Utilization   Network Utilization Review Department  Phone: [phone] [phone] carefully listen to the prompts. All voicemails are confidential.  Email: [email or web address]     ADMISSION INFORMATION  PRESENTATION DATE: 5/3/2024  9:01 PM  OBERVATION ADMISSION DATE:   INPATIENT ADMISSION DATE: 5/4/24 12:12 AM   DISCHARGE DATE: 5/15/2024 10:11 AM   DISPOSITION:Non [facility] SNF/TCU/SNU    Network Utilization Review Department  ATTENTION: Please call with any questions or concerns to [phone] and carefully listen to the prompts so that you are directed to the right person. All voicemails are confidential.   For Discharge needs, contact Care Management DC Support Team at [phone] opt. [phone]  Send all requests for admission clinical reviews, approved or denied determinations and any other requests to dedicated fax number below belonging to the campus where the patient is receiving treatment. List of dedicated fax numbers for the Facilities:  FACILITY NAME UR FAX NUMBER   ADMISSION DENIALS (Administrative/Medical Necessity) [phone]   DISCHARGE SUPPORT TEAM ([facility]) [phone]   PARENT CHILD HEALTH (Maternity/NICU/Pediatrics) [phone]   [facility] [phone]   [facility] [phone]   [facility] [phone]   [facility] [phone]   [facility] [phone]   [facility] [phone]   [facility] [phone]   [facility]  [phone]   [facility] [phone]   [facility] [phone]   [facility] [phone]   [facility] [phone]

## 2024-05-15 NOTE — PROGRESS NOTES
NEPHROLOGY HOSPITAL PROGRESS NOTE   Jerod Worthington 63 y.o. male MRN: [id number]  Unit/Bed#: S -01 Encounter: [id number]  Reason for Consult: CEE on CKD    ASSESSMENT and PLAN:  63-year-old male with history of CKD stage IV, diabetes, obesity, hypertension presented with shortness of breath.  Nephrology consulted for CEE.    1.  CEE on CKD likely progressed to ESRD.  Baseline creatinine 1.8-2.0 since 3757-8010.  No labs in 2023.  Admission creatinine 7.25.  Peak creatinine 9.4 on 05/07.  Status post initiation of dialysis on 05/07 due to hyperkalemia.  Access is right IJ permacath.  Serum/urine immunofixation showed no monoclonal immunoglobulin, KL ratio mildly elevated at 1.72.  We will maintain TTS schedule for dialysis.  Next hemodialysis session will be planned for tomorrow.  If patient gets discharged, he can go for his dialysis tomorrow at [facility] based on notes from social work..    2.  CKD stage IV.  Baseline creatinine 1.8-2.0 since 2020 1/2/2022.  No labs in 2023.  Etiology secondary to diabetic kidney disease, obesity related hyperfiltration.  He also has nephrotic range proteinuria.    3.  Hyperphosphatemia.  Serum phosphorus level 5.1 on 05/11.  Continue low phosphorus diet.  No need for phosphorus binders.  Monitor PTH as outpatient.    4.  Volume overload.  Chest x-ray from 05/03 with severe pulmonary edema with small bilateral pleural effusions.  Echocardiogram with LVEF 60%, moderate MR. We will continue ultrafiltration on dialysis.  Continue torsemide on nondialysis days.    5.  Anemia in CKD.  Hemoglobin was 9.3 yesterday.  Ferritin 58/iron saturation 16%.  We started IV Venofer 100 mg on dialysis days to complete 1 g and then 100 mg weekly.    6.  Hypertension.  Blood pressure is currently well-controlled.  Continue metoprolol 100 mg twice daily, amlodipine 5 mg daily and torsemide 40 mg on nondialysis days.    Discussed with internal medicine team.  After discussion, we agreed that  patient is currently stable for interdialytic period and next hemodialysis session will be tomorrow which can be done as outpatient if patient gets discharged today.    SUBJECTIVE / 24H INTERVAL HISTORY:  Urine output recorded as 100 cc.  He had 2.5 L ultrafiltration on hemodialysis yesterday.  Denies dyspnea.  Denies pain.    OBJECTIVE:  Current Weight: Weight - Scale: 92.3 kg (203 lb 8 oz)  Vitals:    05/14/24 2058 05/14/24 2225 05/15/24 0600 05/15/24 0703   BP: 132/64 145/76  127/81   BP Location:       Pulse: 76 76  74   Resp:    16   Temp:  98.5 °F (36.9 °C)  97.9 °F (36.6 °C)   TempSrc:       SpO2: 97% 96%  96%   Weight:   92.3 kg (203 lb 8 oz)    Height:           Intake/Output Summary (Last 24 hours) at 5/15/2024 0727  Last data filed at 5/14/2024 1620  Gross per 24 hour   Intake 500 ml   Output 3102 ml   Net -2602 ml     Review of Systems   Constitutional:  Negative for chills and fever.   HENT:  Negative for ear pain and sore throat.    Eyes:  Negative for pain and visual disturbance.   Respiratory:  Negative for cough and shortness of breath.    Cardiovascular:  Negative for chest pain and palpitations.   Gastrointestinal:  Negative for abdominal pain and vomiting.   Genitourinary:  Negative for dysuria and hematuria.   Musculoskeletal:  Negative for arthralgias and back pain.   Skin:  Negative for color change and rash.   Neurological:  Negative for seizures and syncope.   All other systems reviewed and are negative.    Physical Exam  Vitals and nursing note reviewed.   Constitutional:       General: He is not in acute distress.     Appearance: He is well-developed.   HENT:      Head: Normocephalic and atraumatic.   Eyes:      Conjunctiva/sclera: Conjunctivae normal.   Cardiovascular:      Rate and Rhythm: Normal rate and regular rhythm.      Pulses: Normal pulses.      Heart sounds: Normal heart sounds. No murmur heard.     Comments: Right chest wall permacath  Pulmonary:      Effort: Pulmonary effort  is normal. No respiratory distress.      Breath sounds: Normal breath sounds.   Abdominal:      Palpations: Abdomen is soft.      Tenderness: There is no abdominal tenderness.   Musculoskeletal:         General: No swelling.      Cervical back: Neck supple.      Right lower leg: No edema.      Left lower leg: No edema.   Skin:     General: Skin is warm and dry.      Capillary Refill: Capillary refill takes less than 2 seconds.   Neurological:      Mental Status: He is alert.   Psychiatric:         Mood and Affect: Mood normal.       Medications:    Current Facility-Administered Medications:     acetaminophen (TYLENOL) tablet 650 mg, 650 mg, Oral, Q6H PRN, Ronna Hurtado, DO, 650 mg at 05/14/24 2129    amLODIPine (NORVASC) tablet 5 mg, 5 mg, Oral, Daily, Shauna Nicole MD    apixaban (ELIQUIS) tablet 5 mg, 5 mg, Oral, BID, Ronna Hurtado DO, 5 mg at 05/14/24 1750    chlorhexidine (PERIDEX) 0.12 % oral rinse 15 mL, 15 mL, Mouth/Throat, Q12H BOBBI, Ronna Hurtado, DO, 15 mL at 05/14/24 2059    insulin lispro (HumALOG/ADMELOG) 100 units/mL subcutaneous injection 1-6 Units, 1-6 Units, Subcutaneous, TID AC, 1 Units at 05/14/24 1150 **AND** Fingerstick Glucose (POCT), , , TID AC, Ronna Moncadause, DO    iron sucrose (VENOFER) 100 mg in sodium chloride 0.9 % 100 mL IVPB, 100 mg, Intravenous, Once per day on Tuesday Thursday Saturday, Brian De Guzman MD, 100 mg at 05/14/24 1016    metoprolol tartrate (LOPRESSOR) tablet 100 mg, 100 mg, Oral, Q12H BOBBI, Ronna Hurtado DO, 100 mg at 05/14/24 2059    polyethylene glycol (MIRALAX) packet 17 g, 17 g, Oral, Daily, Araceli Castorena MD, 17 g at 05/14/24 0906    senna (SENOKOT) tablet 17.2 mg, 2 tablet, Oral, Daily, Araceli Castorena MD, 17.2 mg at 05/14/24 0906    torsemide (DEMADEX) tablet 40 mg, 40 mg, Oral, Every Other Day, Brian De Guzman MD    trimethobenzamide (TIGAN) IM injection 200 mg, 200 mg, Intramuscular, Q6H PRN,  "Ronna Hurtado, DO    Laboratory Results:  Results from last 7 days   Lab Units 05/14/24  0516 05/13/24  0501 05/11/24  1022 05/10/24  0550 05/09/24  0621   WBC Thousand/uL 7.36 6.28 6.12  --  6.46   HEMOGLOBIN g/dL 9.3* 9.3* 9.3*  --  9.6*   HEMATOCRIT % 28.3* 28.4* 28.4*  --  30.4*   PLATELETS Thousands/uL 219 221 235  --  242   POTASSIUM mmol/L 3.8 3.8 4.9 4.1 4.8   CHLORIDE mmol/L 103 102 102 102 102   CO2 mmol/L 25 29 29 28 26   BUN mg/dL 51* 42* 47* 38* 52*   CREATININE mg/dL 6.62* 6.11* 6.89* 6.20* 7.36*   CALCIUM mg/dL 8.1* 8.1* 7.9* 8.0* 8.4   MAGNESIUM mg/dL  --   --   --   --  2.1   PHOSPHORUS mg/dL  --   --  5.1*  --   --        Portions of the record may have been created with voice recognition software. Occasional wrong word or \"sound a like\" substitutions may have occurred due to the inherent limitations of voice recognition software. Read the chart carefully and recognize, using context, where substitutions have occurred. If you have any questions, please contact the dictating provider.    "

## 2024-06-12 DIAGNOSIS — Z99.2 ESRD (END STAGE RENAL DISEASE) ON DIALYSIS (HCC): Primary | ICD-10-CM

## 2024-06-12 DIAGNOSIS — E55.9 VITAMIN D DEFICIENCY: ICD-10-CM

## 2024-06-12 DIAGNOSIS — N18.6 ESRD (END STAGE RENAL DISEASE) ON DIALYSIS (HCC): Primary | ICD-10-CM

## 2024-06-12 RX ORDER — ERGOCALCIFEROL 1.25 MG/1
50000 CAPSULE ORAL WEEKLY
Qty: 12 CAPSULE | Refills: 0 | Status: SHIPPED | OUTPATIENT
Start: 2024-06-12

## 2024-06-12 RX ORDER — ASCORBIC ACID, THIAMINE MONONITRATE,RIBOFLAVIN, NIACINAMIDE, PYRIDOXINE HYDROCHLORIDE, FOLIC ACID, CYANOCOBALAMIN, BIOTIN, CALCIUM PANTOTHENATE, 100; 1.5; 1.7; 20; 10; 1; 6000; 150000; 5 MG/1; MG/1; MG/1; MG/1; MG/1; MG/1; UG/1; UG/1; MG/1
1 CAPSULE, LIQUID FILLED ORAL
Qty: 90 CAPSULE | Refills: 1 | Status: SHIPPED | OUTPATIENT
Start: 2024-06-12

## 2024-07-16 ENCOUNTER — APPOINTMENT (EMERGENCY)
Dept: CT IMAGING | Facility: HOSPITAL | Age: 64
End: 2024-07-16
Payer: MEDICARE

## 2024-07-16 ENCOUNTER — HOSPITAL ENCOUNTER (EMERGENCY)
Facility: HOSPITAL | Age: 64
Discharge: HOME | End: 2024-07-16
Attending: SURGERY
Payer: MEDICARE

## 2024-07-16 VITALS
TEMPERATURE: 97.8 F | RESPIRATION RATE: 16 BRPM | OXYGEN SATURATION: 97 % | SYSTOLIC BLOOD PRESSURE: 130 MMHG | WEIGHT: 198.41 LBS | HEART RATE: 73 BPM | DIASTOLIC BLOOD PRESSURE: 73 MMHG
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
## 2024-07-16 DIAGNOSIS — R42 DIZZINESS: ICD-10-CM

## 2024-07-16 PROBLEM — M54.2 NECK PAIN: Status: ACTIVE | Noted: 2024-07-16

## 2024-07-16 LAB
ALBUMIN SERPL BCG-MCNC: 3.8 G/DL (ref 3.5–5)
ALP SERPL-CCNC: 93 U/L (ref 34–104)
ALT SERPL W P-5'-P-CCNC: 15 U/L (ref 7–52)
ANION GAP SERPL CALCULATED.3IONS-SCNC: 6 MMOL/L (ref 4–13)
AST SERPL W P-5'-P-CCNC: 27 U/L (ref 13–39)
BASE EXCESS BLDA CALC-SCNC: 4 MMOL/L (ref -2–3)
BASOPHILS # BLD AUTO: 0.04 THOUSANDS/ÂΜL (ref 0–0.1)
BASOPHILS NFR BLD AUTO: 1 % (ref 0–1)
BILIRUB SERPL-MCNC: 0.33 MG/DL (ref 0.2–1)
BUN SERPL-MCNC: 22 MG/DL (ref 5–25)
CA-I BLD-SCNC: 1.01 MMOL/L (ref 1.12–1.32)
CALCIUM SERPL-MCNC: 8.3 MG/DL (ref 8.4–10.2)
CHLORIDE SERPL-SCNC: 102 MMOL/L (ref 96–108)
CO2 SERPL-SCNC: 27 MMOL/L (ref 21–32)
CREAT SERPL-MCNC: 4.21 MG/DL (ref 0.6–1.3)
EOSINOPHIL # BLD AUTO: 0.23 THOUSAND/ÂΜL (ref 0–0.61)
EOSINOPHIL NFR BLD AUTO: 4 % (ref 0–6)
ERYTHROCYTE [DISTWIDTH] IN BLOOD BY AUTOMATED COUNT: 13 % (ref 11.6–15.1)
GFR SERPL CREATININE-BSD FRML MDRD: 14 ML/MIN/1.73SQ M
GLUCOSE SERPL-MCNC: 148 MG/DL (ref 65–140)
GLUCOSE SERPL-MCNC: 148 MG/DL (ref 65–140)
HCO3 BLDA-SCNC: 28.7 MMOL/L (ref 24–30)
HCT VFR BLD AUTO: 30.5 % (ref 36.5–49.3)
HCT VFR BLD CALC: 31 % (ref 36.5–49.3)
HGB BLD-MCNC: 10.3 G/DL (ref 12–17)
HGB BLDA-MCNC: 10.5 G/DL (ref 12–17)
IMM GRANULOCYTES # BLD AUTO: 0.05 THOUSAND/UL (ref 0–0.2)
IMM GRANULOCYTES NFR BLD AUTO: 1 % (ref 0–2)
LYMPHOCYTES # BLD AUTO: 1.8 THOUSANDS/ÂΜL (ref 0.6–4.47)
LYMPHOCYTES NFR BLD AUTO: 31 % (ref 14–44)
MCH RBC QN AUTO: 32.3 PG (ref 26.8–34.3)
MCHC RBC AUTO-ENTMCNC: 33.8 G/DL (ref 31.4–37.4)
MCV RBC AUTO: 96 FL (ref 82–98)
MONOCYTES # BLD AUTO: 0.53 THOUSAND/ÂΜL (ref 0.17–1.22)
MONOCYTES NFR BLD AUTO: 9 % (ref 4–12)
NEUTROPHILS # BLD AUTO: 3.19 THOUSANDS/ÂΜL (ref 1.85–7.62)
NEUTS SEG NFR BLD AUTO: 54 % (ref 43–75)
NRBC BLD AUTO-RTO: 0 /100 WBCS
PCO2 BLD: 30 MMOL/L (ref 21–32)
PCO2 BLD: 44.9 MM HG (ref 42–50)
PH BLD: 7.41 [PH] (ref 7.3–7.4)
PLATELET # BLD AUTO: 245 THOUSANDS/UL (ref 149–390)
PMV BLD AUTO: 8.9 FL (ref 8.9–12.7)
PO2 BLD: 26 MM HG (ref 35–45)
POTASSIUM BLD-SCNC: 4.5 MMOL/L (ref 3.5–5.3)
POTASSIUM SERPL-SCNC: 4.5 MMOL/L (ref 3.5–5.3)
PROT SERPL-MCNC: 7.8 G/DL (ref 6.4–8.4)
RBC # BLD AUTO: 3.19 MILLION/UL (ref 3.88–5.62)
SAO2 % BLD FROM PO2: 48 % (ref 60–85)
SODIUM BLD-SCNC: 139 MMOL/L (ref 136–145)
SODIUM SERPL-SCNC: 135 MMOL/L (ref 135–147)
SPECIMEN SOURCE: ABNORMAL
WBC # BLD AUTO: 5.84 THOUSAND/UL (ref 4.31–10.16)

## 2024-07-16 PROCEDURE — 36415 COLL VENOUS BLD VENIPUNCTURE: CPT | Performed by: SURGERY

## 2024-07-16 PROCEDURE — 70450 CT HEAD/BRAIN W/O DYE: CPT

## 2024-07-16 PROCEDURE — 84295 ASSAY OF SERUM SODIUM: CPT

## 2024-07-16 PROCEDURE — 80053 COMPREHEN METABOLIC PANEL: CPT | Performed by: SURGERY

## 2024-07-16 PROCEDURE — 76705 ECHO EXAM OF ABDOMEN: CPT | Performed by: SURGERY

## 2024-07-16 PROCEDURE — EDAIR PR ED AIR: Performed by: EMERGENCY MEDICINE

## 2024-07-16 PROCEDURE — 82947 ASSAY GLUCOSE BLOOD QUANT: CPT

## 2024-07-16 PROCEDURE — 84132 ASSAY OF SERUM POTASSIUM: CPT

## 2024-07-16 PROCEDURE — 99284 EMERGENCY DEPT VISIT MOD MDM: CPT

## 2024-07-16 PROCEDURE — 85025 COMPLETE CBC W/AUTO DIFF WBC: CPT | Performed by: SURGERY

## 2024-07-16 PROCEDURE — 82803 BLOOD GASES ANY COMBINATION: CPT

## 2024-07-16 PROCEDURE — 93308 TTE F-UP OR LMTD: CPT | Performed by: SURGERY

## 2024-07-16 PROCEDURE — 99204 OFFICE O/P NEW MOD 45 MIN: CPT | Performed by: SURGERY

## 2024-07-16 PROCEDURE — 82330 ASSAY OF CALCIUM: CPT

## 2024-07-16 PROCEDURE — 72125 CT NECK SPINE W/O DYE: CPT

## 2024-07-16 PROCEDURE — 85014 HEMATOCRIT: CPT

## 2024-07-16 NOTE — ASSESSMENT & PLAN NOTE
-Patient states that the dizziness is not new and that he often feels dizzy right after getting dialysis

## 2024-07-16 NOTE — CASE MANAGEMENT
Case Management ED Progress Note    Patient name Nick Feliciano  Location TR-02/TR-02 MRN [id number]  : 1960 Date 2024        OBJECTIVE:    Chief Complaint:   Patient Class: Emergency  Preferred Pharmacy: No Pharmacies Listed  Primary Care Provider: No primary care provider on file.    Primary Insurance:   Secondary Insurance:     ED Progress Note:  CM responded to trauma alert. Patient was transported into trauma bay by [facility] for eval. Patient responsive to medical team.     Per EMS, daughter was on scene when they arrived. [phone] Attempted to call daughter- no response. Trauma AP aware of above.     No current identified CM needs. CM will follow and update screening, assessment, and discharge planning as appropriate.

## 2024-07-16 NOTE — DISCHARGE INSTRUCTIONS
Vuelva a la nate de emergencias si tiene algún síntoma nuevo o que empeora, incluido un empeoramiento de los mareos, cambios en la visión, sensación de que se va a desmayar o un empeoramiento del dolor de tee.    Puede william Tylenol según sea necesario según las instrucciones del frasco.  por favor zulema un seguimiento con martel médico      Please come back to the ER if you have any new or worsening symptoms including worsening dizziness, vision changes, feeling like you are going to pass out, or worsening neck pain.    You can take Tylenol as needed according to the instructions on the bottle.

## 2024-07-16 NOTE — ED PROVIDER NOTES
Emergency Department Airway Evaluation and Management Form    History  Obtained from: EMS and patient  Patient has no allergy information on record.  Chief Complaint   Patient presents with    Fall     Pt finished at dialysis and got dizzy, headstrike on eliquis     HPI    No past medical history on file.  No past surgical history on file.  No family history on file.     I have reviewed and agree with the history as documented.    Review of Systems    Physical Exam  /88   Pulse 69   Temp 97.8 °F (36.6 °C) (Oral)   Resp 18   Wt 90 kg (198 lb 6.6 oz)   SpO2 99%     Physical Exam    ED Medications  Medications - No data to display    Intubation  Procedures    Notes  64-year-old male who takes Eliquis presents following fall.  He became lightheaded after walking outside from completion of dialysis treatment.  He struck his head and reports having neck pain.    On arrival he is alert and in a cervical collar.  No oral facial trauma.  Speech clear.  Respirations unlabored.  No hypoxia.  Airway intervention not necessary.    Following spinal assessment care continued by trauma team.    Final Diagnosis  Final diagnoses:   None       ED Provider  Electronically Signed by     Ana Vazquez MD  07/16/24 1170

## 2024-07-16 NOTE — ASSESSMENT & PLAN NOTE
-Patient endorses neck pain  -Cervical midline tenderness on exam  -CT cervical spine negative for acute process

## 2024-07-16 NOTE — ASSESSMENT & PLAN NOTE
-Patient felt dizzy immedietly after finishing dialysis and fell to ground after getting up from chair  -No LOC  -On Eliquis  -Endorses neck pain, no other symptoms  -CT head and CT cervical spine negative for acute process  -Patient endorses no complaints other than mild neck pain  -Patient will be discharged with explicit return precautions

## 2024-07-16 NOTE — PROCEDURES
POC FAST US    Date/Time: 7/16/2024 10:59 AM    Performed by: Arnol Ballesteros MD  Authorized by: Arnol Ballesteros MD    Patient location:  ED  Procedure details:     Exam Type:  Diagnostic    Indications: blunt abdominal trauma and blunt chest trauma      Assess for:  Hemothorax, intra-abdominal fluid and pericardial effusion    Technique: FAST      Views obtained:  Heart - Pericardial sac, LUQ - Splenorenal space, Suprapubic - Pouch of Ted and RUQ - Field's Pouch    Image quality: diagnostic      Image availability:  Images available in PACS  FAST Findings:     RUQ (Hepatorenal) free fluid: absent      LUQ (Splenorenal) free fluid: absent      Suprapubic free fluid: absent      Cardiac wall motion: identified      Pericardial effusion: absent    Interpretation:     Impressions: negative

## 2024-07-16 NOTE — H&P
[facility]  H&P  Name: Nick Feliciano 64 y.o. male I MRN: [id number]  Unit/Bed#: ED-09 I Date of Admission: 7/16/2024   Date of Service: 7/16/2024 I Hospital Day: 0      Assessment & Plan   Fall  Assessment & Plan  -Patient felt dizzy immedietly after finishing dialysis and fell to ground after getting up from chair  -No LOC  -On Eliquis  -Endorses neck pain, no other symptoms  -CT head and CT cervical spine negative for acute process  -Patient endorses no complaints other than mild neck pain  -Patient will be discharged with explicit return precautions    Dizziness  Assessment & Plan  -Patient states that the dizziness is not new and that he often feels dizzy right after getting dialysis    Neck pain  Assessment & Plan  -Patient endorses neck pain  -Cervical midline tenderness on exam  -CT cervical spine negative for acute process             Trauma Alert: Level B   Model of Arrival: Ambulance    Trauma Team: Attending Chas and Residents Lesli  Consultants:     None     History of Present Illness     Chief Complaint: Neck pain  Mechanism:Fall     HPI:    Nick Feliciano is a 64 y.o. male who presents with fall with head strike on Eliquis.  Patient states that he was at dialysis center when he had just finished up his dialysis session and went to get up as he suddenly felt dizzy and fell to the ground, hitting his head.  Patient states that he did not lose consciousness.  He also states that he often gets dizzy after dialysis.  He endorses neck pain and no other symptoms.  Denies any vision changes, severe headache, focal neurodeficits, or nausea/vomiting.  On arrival to trauma bay patient was hemodynamically stable and GCS 15.  Fast negative.    Review of Systems   Constitutional:  Negative for chills, fatigue and fever.   HENT: Negative.     Respiratory:  Negative for cough and shortness of breath.    Gastrointestinal:  Negative for abdominal pain, nausea and vomiting.   Genitourinary:  Negative.    Musculoskeletal:  Positive for neck pain. Negative for back pain.   Skin:  Negative for color change and pallor.   Neurological:  Negative for syncope and headaches.   Psychiatric/Behavioral:  Negative for agitation, behavioral problems and confusion.    All other systems reviewed and are negative.    12-point, complete review of systems was reviewed and negative except as stated above.     Historical Information     No past medical history on file.  No past surgical history on file.   Unable to obtain history due to  none         There is no immunization history on file for this patient.  Last Tetanus: Given today  Family History: Non-contributory     Meds/Allergies   all current active meds have been reviewed  Allergies have not been reviewed;  Not on File    Objective   Initial Vitals:   Temperature: 97.8 °F (36.6 °C) (07/16/24 1048)  Pulse: 69 (07/16/24 1048)  Respirations: 18 (07/16/24 1048)  Blood Pressure: 162/88 (07/16/24 1048)    Primary Survey:   Airway:        Status: patent;        Pre-hospital Interventions: none        Hospital Interventions: none  Breathing:        Pre-hospital Interventions: none       Effort: normal       Right breath sounds: normal       Left breath sounds: normal  Circulation:        Rhythm: regular       Rate: regular   Right Pulses Left Pulses    R radial: 2+  R femoral: 2+  R pedal: 2+  R carotid: 2+  R popliteal: 2+ L radial: 2+  L femoral: 2+  L pedal: 2+  L carotid: 2+  L popliteal: 2+   Disability:        GCS: Eye: 4; Verbal: 5 Motor: 6 Total: 15       Right Pupil: 3 mm;  round;  reactive         Left Pupil:  3 mm;  round;  reactive      R Motor Strength L Motor Strength    R : 5/5  R dorsiflex: 5/5   L : 5/5  L dorsiflex: 5/5  L plantarflex: 5/5        Sensory:  No sensory deficit  Exposure:       Completed: Yes      Secondary Survey:  Physical Exam  Vitals and nursing note reviewed.   Constitutional:       Appearance: Normal appearance.   HENT:       Head: Normocephalic and atraumatic.      Right Ear: Tympanic membrane, ear canal and external ear normal.      Left Ear: Tympanic membrane, ear canal and external ear normal.      Nose: Nose normal.      Mouth/Throat:      Mouth: Mucous membranes are moist.      Pharynx: Oropharynx is clear.   Eyes:      Extraocular Movements: Extraocular movements intact.      Conjunctiva/sclera: Conjunctivae normal.      Pupils: Pupils are equal, round, and reactive to light.   Cardiovascular:      Rate and Rhythm: Normal rate and regular rhythm.      Pulses: Normal pulses.      Heart sounds: Normal heart sounds.   Pulmonary:      Effort: Pulmonary effort is normal.      Breath sounds: Normal breath sounds.   Abdominal:      General: Abdomen is flat. Bowel sounds are normal.      Palpations: Abdomen is soft.   Musculoskeletal:      Comments: Cervical spine midline tenderness. No thoracolumbar tenderness.   Skin:     General: Skin is warm and dry.      Capillary Refill: Capillary refill takes less than 2 seconds.      Comments: Small skin tear to left elbow   Neurological:      General: No focal deficit present.      Mental Status: He is alert and oriented to person, place, and time.   Psychiatric:         Mood and Affect: Mood normal.         Behavior: Behavior normal.         Thought Content: Thought content normal.         Invasive Devices       None                 Lab Results: I have personally reviewed all pertinent laboratory/test results from 07/16/24, including the preceding 24 hours.  Recent Labs     07/16/24  1057 07/16/24  1059   WBC 5.84  --    HGB 10.3* 10.5*   HCT 30.5* 31*     --    SODIUM 135  --    K 4.5  --      --    CO2 27 30   BUN 22  --    CREATININE 4.21*  --    GLUC 148*  --    CAIONIZED  --  1.01*   AST 27  --    ALT 15  --    ALB 3.8  --    TBILI 0.33  --    ALKPHOS 93  --        Imaging Results: I have personally reviewed pertinent images saved in PACS. CT scan findings (and other pertinent  positive findings on images) were discussed with radiology. My interpretation of the images/reports are as follows:  Chest Xray(s): negative for acute findings   FAST exam(s): negative for acute findings   CT Scan(s): negative for acute findings   Additional Xray(s): negative for acute findings     Other Studies: N/A    Code Status: No Order  Advance Directive and Living Will:      Power of :    POLST:

## 2024-07-21 ENCOUNTER — APPOINTMENT (EMERGENCY)
Dept: RADIOLOGY | Facility: HOSPITAL | Age: 64
End: 2024-07-21
Payer: MEDICARE

## 2024-07-21 ENCOUNTER — HOSPITAL ENCOUNTER (EMERGENCY)
Facility: HOSPITAL | Age: 64
Discharge: HOME | End: 2024-07-21
Attending: EMERGENCY MEDICINE
Payer: MEDICARE

## 2024-07-21 VITALS
DIASTOLIC BLOOD PRESSURE: 63 MMHG | TEMPERATURE: 98.3 F | OXYGEN SATURATION: 99 % | RESPIRATION RATE: 16 BRPM | SYSTOLIC BLOOD PRESSURE: 118 MMHG | HEART RATE: 73 BPM

## 2024-07-21 DIAGNOSIS — I48.92 ATRIAL FLUTTER (HCC): ICD-10-CM

## 2024-07-21 DIAGNOSIS — T82.9XXA DIALYSIS COMPLICATION, INITIAL ENCOUNTER: ICD-10-CM

## 2024-07-21 DIAGNOSIS — R42 DIZZINESS: Primary | ICD-10-CM

## 2024-07-21 LAB
2HR DELTA HS TROPONIN: -4 NG/L
ALBUMIN SERPL BCG-MCNC: 3.8 G/DL (ref 3.5–5)
ALP SERPL-CCNC: 103 U/L (ref 34–104)
ALT SERPL W P-5'-P-CCNC: 14 U/L (ref 7–52)
ANION GAP SERPL CALCULATED.3IONS-SCNC: 13 MMOL/L (ref 4–13)
AST SERPL W P-5'-P-CCNC: 14 U/L (ref 13–39)
ATRIAL RATE: 300 BPM
BASOPHILS # BLD AUTO: 0.04 THOUSANDS/ÂΜL (ref 0–0.1)
BASOPHILS NFR BLD AUTO: 0 % (ref 0–1)
BILIRUB SERPL-MCNC: 0.58 MG/DL (ref 0.2–1)
BUN SERPL-MCNC: 41 MG/DL (ref 5–25)
CALCIUM SERPL-MCNC: 8.7 MG/DL (ref 8.4–10.2)
CARDIAC TROPONIN I PNL SERPL HS: 17 NG/L
CARDIAC TROPONIN I PNL SERPL HS: 21 NG/L
CHLORIDE SERPL-SCNC: 100 MMOL/L (ref 96–108)
CO2 SERPL-SCNC: 23 MMOL/L (ref 21–32)
CREAT SERPL-MCNC: 7.33 MG/DL (ref 0.6–1.3)
EOSINOPHIL # BLD AUTO: 0.32 THOUSAND/ÂΜL (ref 0–0.61)
EOSINOPHIL NFR BLD AUTO: 3 % (ref 0–6)
ERYTHROCYTE [DISTWIDTH] IN BLOOD BY AUTOMATED COUNT: 12.5 % (ref 11.6–15.1)
GFR SERPL CREATININE-BSD FRML MDRD: 7 ML/MIN/1.73SQ M
GLUCOSE SERPL-MCNC: 164 MG/DL (ref 65–140)
HCT VFR BLD AUTO: 30.6 % (ref 36.5–49.3)
HGB BLD-MCNC: 10.4 G/DL (ref 12–17)
IMM GRANULOCYTES # BLD AUTO: 0.05 THOUSAND/UL (ref 0–0.2)
IMM GRANULOCYTES NFR BLD AUTO: 1 % (ref 0–2)
LYMPHOCYTES # BLD AUTO: 1.43 THOUSANDS/ÂΜL (ref 0.6–4.47)
LYMPHOCYTES NFR BLD AUTO: 13 % (ref 14–44)
MCH RBC QN AUTO: 32 PG (ref 26.8–34.3)
MCHC RBC AUTO-ENTMCNC: 34 G/DL (ref 31.4–37.4)
MCV RBC AUTO: 94 FL (ref 82–98)
MONOCYTES # BLD AUTO: 0.66 THOUSAND/ÂΜL (ref 0.17–1.22)
MONOCYTES NFR BLD AUTO: 6 % (ref 4–12)
NEUTROPHILS # BLD AUTO: 8.61 THOUSANDS/ÂΜL (ref 1.85–7.62)
NEUTS SEG NFR BLD AUTO: 77 % (ref 43–75)
NRBC BLD AUTO-RTO: 0 /100 WBCS
P AXIS: 182 DEGREES
PLATELET # BLD AUTO: 261 THOUSANDS/UL (ref 149–390)
PMV BLD AUTO: 8.6 FL (ref 8.9–12.7)
POTASSIUM SERPL-SCNC: 3.9 MMOL/L (ref 3.5–5.3)
PROT SERPL-MCNC: 7.8 G/DL (ref 6.4–8.4)
QRS AXIS: -40 DEGREES
QRSD INTERVAL: 124 MS
QT INTERVAL: 426 MS
QTC INTERVAL: 475 MS
RBC # BLD AUTO: 3.25 MILLION/UL (ref 3.88–5.62)
SODIUM SERPL-SCNC: 136 MMOL/L (ref 135–147)
T WAVE AXIS: 48 DEGREES
VENTRICULAR RATE: 75 BPM
WBC # BLD AUTO: 11.11 THOUSAND/UL (ref 4.31–10.16)

## 2024-07-21 PROCEDURE — 93005 ELECTROCARDIOGRAM TRACING: CPT

## 2024-07-21 PROCEDURE — 85025 COMPLETE CBC W/AUTO DIFF WBC: CPT

## 2024-07-21 PROCEDURE — 71045 X-RAY EXAM CHEST 1 VIEW: CPT

## 2024-07-21 PROCEDURE — 84484 ASSAY OF TROPONIN QUANT: CPT

## 2024-07-21 PROCEDURE — 80053 COMPREHEN METABOLIC PANEL: CPT

## 2024-07-21 PROCEDURE — 36415 COLL VENOUS BLD VENIPUNCTURE: CPT

## 2024-07-21 PROCEDURE — 99285 EMERGENCY DEPT VISIT HI MDM: CPT | Performed by: EMERGENCY MEDICINE

## 2024-07-21 PROCEDURE — 96361 HYDRATE IV INFUSION ADD-ON: CPT

## 2024-07-21 PROCEDURE — 96360 HYDRATION IV INFUSION INIT: CPT

## 2024-07-21 PROCEDURE — 93010 ELECTROCARDIOGRAM REPORT: CPT | Performed by: INTERNAL MEDICINE

## 2024-07-21 PROCEDURE — 99284 EMERGENCY DEPT VISIT MOD MDM: CPT

## 2024-07-21 RX ADMIN — SODIUM CHLORIDE 1000 ML: 0.9 INJECTION, SOLUTION INTRAVENOUS at 15:29

## 2024-07-21 NOTE — ED ATTENDING ATTESTATION
7/21/2024  I, Brooks Torres MD, saw and evaluated the patient. I have discussed the patient with the resident/non-physician practitioner and agree with the resident's/non-physician practitioner's findings, Plan of Care, and MDM as documented in the resident's/non-physician practitioner's note, except where noted. All available labs and Radiology studies were reviewed.  I was present for key portions of any procedure(s) performed by the resident/non-physician practitioner and I was immediately available to provide assistance.       At this point I agree with the current assessment done in the Emergency Department.  I have conducted an independent evaluation of this patient a history and physical is as follows:    64-year-old male with history of end-stage renal disease on dialysis Tuesday Thursday Saturday presents to the emergency department for evaluation of dizziness.  Patient reports for the past week having intermittent episodes of dizziness that he describes as a feeling of lightheadedness with associated vomiting.  Patient also reports having pain to his chest that has been ongoing for the past week as well.  He states that his symptoms are worse after dialysis.  No fevers, diarrhea, sick contacts, shortness of breath, abdominal pain, headache, blurry vision or localized numbness, tingling or weakness.    A 10 point review of systems was conducted and negative except for as stated above in the HPI    Physical Exam  Vitals and nursing note reviewed.   Constitutional:       General: He is not in acute distress.     Appearance: He is well-developed. He is obese.   HENT:      Head: Normocephalic and atraumatic.   Eyes:      Extraocular Movements: Extraocular movements intact.      Conjunctiva/sclera: Conjunctivae normal.      Pupils: Pupils are equal, round, and reactive to light.   Cardiovascular:      Rate and Rhythm: Normal rate. Rhythm irregular.      Pulses: Normal pulses.   Pulmonary:      Effort:  Pulmonary effort is normal. No respiratory distress.      Breath sounds: Normal breath sounds.   Abdominal:      Palpations: Abdomen is soft.      Tenderness: There is no abdominal tenderness.   Musculoskeletal:         General: No swelling.      Cervical back: Neck supple.   Skin:     General: Skin is warm and dry.      Capillary Refill: Capillary refill takes less than 2 seconds.   Neurological:      Mental Status: He is alert and oriented to person, place, and time.      GCS: GCS eye subscore is 4. GCS verbal subscore is 5. GCS motor subscore is 6.      Cranial Nerves: Cranial nerves 2-12 are intact.      Sensory: Sensation is intact.      Motor: Motor function is intact.   Psychiatric:         Mood and Affect: Mood normal.           ED Course     64-year-old male presented to the emergency department for evaluation of dizziness.  On arrival patient awake, alert and in no acute distress.  Initial vital signs within normal limits.  EKG was ordered to evaluate for acute arrhythmia/ischemia.  My interpretation EKG with rate controlled atrial flutter with no acute ischemic changes similar to prior EKG.  Chest x-ray ordered to evaluate for acute cardiopulmonary process including but not limited to pneumonia, pneumothorax, edema, effusion.  On my interpretation chest x-ray with no acute cardiopulmonary process.  Blood work within the patient's baseline.  Delta troponin within normal limits.  Patient treated with a fluid bolus.  On reevaluation the patient reported significant improvement of symptoms.  All diagnostic studies were discussed with the patient in detail.  He is appropriate for discharge.  Recommendation was made for the patient to follow-up with his PCP for continued symptoms.  Return precautions were discussed.    Patient agrees with the plan for discharge and feels comfortable to go home with proper f/u. Advised to return for worsening or additional problems. Diagnostic tests were reviewed and questions  answered. Diagnosis, care plan and treatment options were discussed. The patient understands instructions and will follow up as directed.      Critical Care Time  ECG 12 Lead Documentation Only    Date/Time: 7/21/2024 3:32 PM    Performed by: Brooks Torres MD  Authorized by: Brooks Torres MD    ECG reviewed by me, the ED Provider: yes    Patient location:  ED  Previous ECG:     Previous ECG:  Compared to current    Similarity:  No change    Comparison to cardiac monitor: Yes    Interpretation:     Interpretation: abnormal    Rate:     ECG rate assessment: normal    Rhythm:     Rhythm: atrial flutter    Ectopy:     Ectopy: none    QRS:     QRS axis:  Left  Conduction:     Conduction: abnormal      Abnormal conduction: complete RBBB    ST segments:     ST segments:  Normal  T waves:     T waves: normal

## 2024-07-21 NOTE — ED PROVIDER NOTES
History  Chief Complaint   Patient presents with    Dizziness     Patient arrives to the ED with complain of dizziness, vomiting, chills, and pain around his port on the right chest. Symptoms have been persistent for a week, worsening after his last appointment with dialysis.      64-year-old male with significant PMH including HTN, HLD, DM, CKD on dialysis, and atrial flutter who presents to the ED for lightheadedness and dizziness has been ongoing for the past week.  Patient states that he has dialysis every Tuesday, Thursday, and Saturday and after yesterday's dialysis he started to feel worse.  Patient states for the past week he has been having also heart palpitations as well as nausea.  Patient was recently evaluated in the emergency department after a fall with head strike while on Eliquis.  Patient was discharged with no acute concerns at that time.  Patient denies any shortness of breath or chest pain at this time and states he is also not having any heart palpitations currently.  Patient states he has been taking his medications regularly.  No other acute concerns at this time. Denies chest pain, SOB, cough, abdominal pain, v/d, fever, chills, HA, dysuria, hematuria, hematochezia, or melena.           Prior to Admission Medications   Prescriptions Last Dose Informant Patient Reported? Taking?   acetaminophen (TYLENOL) 325 mg tablet   No No   Sig: Take 2 tablets (650 mg total) by mouth every 6 (six) hours as needed for mild pain   amLODIPine (NORVASC) 5 mg tablet   No No   Sig: Take 1 tablet (5 mg total) by mouth daily   apixaban (ELIQUIS) 5 mg   No No   Sig: Take 1 tablet (5 mg total) by mouth 2 (two) times a day   b complex-vitamin C-folic acid (RENAL) 1 mg   No No   Sig: Take 1 capsule by mouth daily with dinner   ergocalciferol (ERGOCALCIFEROL) 1.25 MG (67253 UT) capsule   No No   Sig: Take 1 capsule (50,000 Units total) by mouth once a week   insulin lispro (HumALOG/ADMELOG) 100 units/mL injection   No  No   Sig: Inject 1-6 Units under the skin 3 (three) times a day before meals   metoprolol tartrate (LOPRESSOR) 100 mg tablet   No No   Sig: Take 1 tablet (100 mg total) by mouth every 12 (twelve) hours   polyethylene glycol (MIRALAX) 17 g packet   No No   Sig: Take 17 g by mouth daily Hold for > 2 Loose Stools   senna (SENOKOT) 8.6 mg   No No   Sig: Take 2 tablets (17.2 mg total) by mouth in the morning   torsemide (DEMADEX) 20 mg tablet   No No   Sig: Take 2 tablets (40 mg total) by mouth every other day      Facility-Administered Medications: None       History reviewed. No pertinent past medical history.    Past Surgical History:   Procedure Laterality Date    IR TUNNELED DIALYSIS CATHETER PLACEMENT  5/7/2024       History reviewed. No pertinent family history.  I have reviewed and agree with the history as documented.    E-Cigarette/Vaping     E-Cigarette/Vaping Substances     Social History     Tobacco Use    Smoking status: Never    Smokeless tobacco: Never   Substance Use Topics    Alcohol use: Never    Drug use: Never        Review of Systems   Constitutional:  Negative for appetite change, chills, diaphoresis, fatigue and fever.   HENT: Negative.  Negative for congestion, ear discharge, ear pain, postnasal drip, rhinorrhea, sore throat and trouble swallowing.    Eyes: Negative.  Negative for pain and visual disturbance.   Respiratory: Negative.  Negative for cough and shortness of breath.    Cardiovascular:  Positive for palpitations. Negative for chest pain.   Gastrointestinal:  Positive for nausea. Negative for abdominal pain, blood in stool, constipation, diarrhea and vomiting.   Genitourinary: Negative.  Negative for decreased urine volume, difficulty urinating, dysuria, flank pain and hematuria.   Musculoskeletal: Negative.  Negative for arthralgias and back pain.   Skin: Negative.  Negative for color change and rash.   Neurological:  Positive for dizziness and light-headedness. Negative for syncope,  weakness and headaches.       Physical Exam  ED Triage Vitals   Temperature Pulse Respirations Blood Pressure SpO2   07/21/24 1520 07/21/24 1517 07/21/24 1517 07/21/24 1517 07/21/24 1520   98.3 °F (36.8 °C) 74 20 122/58 99 %      Temp Source Heart Rate Source Patient Position - Orthostatic VS BP Location FiO2 (%)   07/21/24 1520 07/21/24 1517 07/21/24 1517 07/21/24 1517 --   Oral Monitor Lying Right arm       Pain Score       --                    Orthostatic Vital Signs  Vitals:    07/21/24 1517 07/21/24 1520 07/21/24 1600 07/21/24 1700   BP: 122/58 122/58 99/56 118/63   Pulse: 74 74 73 73   Patient Position - Orthostatic VS: Lying Lying         Physical Exam  Vitals and nursing note reviewed.   Constitutional:       General: He is not in acute distress.     Appearance: Normal appearance. He is normal weight.   HENT:      Head: Normocephalic and atraumatic.      Right Ear: External ear normal.      Left Ear: External ear normal.      Nose: Nose normal.      Mouth/Throat:      Pharynx: Oropharynx is clear.   Eyes:      Extraocular Movements: Extraocular movements intact.      Conjunctiva/sclera: Conjunctivae normal.      Pupils: Pupils are equal, round, and reactive to light.   Cardiovascular:      Rate and Rhythm: Normal rate. Rhythm irregular.      Pulses: Normal pulses.      Heart sounds: Normal heart sounds.      Comments: RRR with +S1 and S2, no murmurs appreciated on exam. Peripheral pulses intact.    Pulmonary:      Effort: Pulmonary effort is normal. No respiratory distress.      Breath sounds: Normal breath sounds. No wheezing, rhonchi or rales.      Comments: CTABL with no abnormal lung sounds such as wheezes or rales appreciated on exam.     Abdominal:      General: Abdomen is flat. Bowel sounds are normal. There is no distension.      Palpations: Abdomen is soft.      Tenderness: There is no abdominal tenderness.      Comments: Soft, non tender, normo-active bowel sounds. Without rigidity, guarding, or  distension.     Musculoskeletal:         General: Normal range of motion.      Cervical back: Normal range of motion.      Right lower leg: No edema.      Left lower leg: No edema.   Skin:     General: Skin is warm and dry.   Neurological:      General: No focal deficit present.      Mental Status: He is alert and oriented to person, place, and time. Mental status is at baseline.      Comments: CN grossly intact on visualization. No focal neurologic deficits noted on exam. 5/5 strength in all extremities. Neurovascularly intact with normal sensation and motor function.             ED Medications  Medications   sodium chloride 0.9 % bolus 1,000 mL (0 mL Intravenous Stopped 7/21/24 1755)       Diagnostic Studies  Results Reviewed       Procedure Component Value Units Date/Time    HS Troponin I 2hr [[id number]]  (Normal) Collected: 07/21/24 1710    Lab Status: Final result Specimen: Blood from Arm, Left Updated: 07/21/24 1737     hs TnI 2hr 17 ng/L      Delta 2hr hsTnI -4 ng/L     HS Troponin 0hr (reflex protocol) [[id number]]  (Normal) Collected: 07/21/24 1526    Lab Status: Final result Specimen: Blood from Arm, Left Updated: 07/21/24 1552     hs TnI 0hr 21 ng/L     Comprehensive metabolic panel [[id number]]  (Abnormal) Collected: 07/21/24 1526    Lab Status: Final result Specimen: Blood from Arm, Left Updated: 07/21/24 1546     Sodium 136 mmol/L      Potassium 3.9 mmol/L      Chloride 100 mmol/L      CO2 23 mmol/L      ANION GAP 13 mmol/L      BUN 41 mg/dL      Creatinine 7.33 mg/dL      Glucose 164 mg/dL      Calcium 8.7 mg/dL      AST 14 U/L      ALT 14 U/L      Alkaline Phosphatase 103 U/L      Total Protein 7.8 g/dL      Albumin 3.8 g/dL      Total Bilirubin 0.58 mg/dL      eGFR 7 ml/min/1.73sq m     Narrative:      National Kid[employer] Disease Foundation guidelines for Chronic Kidney Disease (CKD):     Stage 1 with normal or high GFR (GFR > 90 mL/min/1.73 square meters)    Stage 2 Mild CKD (GFR = 60-89 mL/min/1.73  square meters)    Stage 3A Moderate CKD (GFR = 45-59 mL/min/1.73 square meters)    Stage 3B Moderate CKD (GFR = 30-44 mL/min/1.73 square meters)    Stage 4 Severe CKD (GFR = 15-29 mL/min/1.73 square meters)    Stage 5 End Stage CKD (GFR <15 mL/min/1.73 square meters)  Note: GFR calculation is accurate only with a steady state creatinine    CBC and differential [[id number]]  (Abnormal) Collected: 07/21/24 1526    Lab Status: Final result Specimen: Blood from Arm, Left Updated: 07/21/24 1531     WBC 11.11 Thousand/uL      RBC 3.25 Million/uL      Hemoglobin 10.4 g/dL      Hematocrit 30.6 %      MCV 94 fL      MCH 32.0 pg      MCHC 34.0 g/dL      RDW 12.5 %      MPV 8.6 fL      Platelets 261 Thousands/uL      nRBC 0 /100 WBCs      Segmented % 77 %      Immature Grans % 1 %      Lymphocytes % 13 %      Monocytes % 6 %      Eosinophils Relative 3 %      Basophils Relative 0 %      Absolute Neutrophils 8.61 Thousands/µL      Absolute Immature Grans 0.05 Thousand/uL      Absolute Lymphocytes 1.43 Thousands/µL      Absolute Monocytes 0.66 Thousand/µL      Eosinophils Absolute 0.32 Thousand/µL      Basophils Absolute 0.04 Thousands/µL                    XR chest 1 view portable   ED Interpretation by Brooks Torres MD (07/21 1809)   No acute cardiopulmonary process      Final Result by Barbara Mariscal MD (07/22 0828)      Cardiomegaly and right lower lobe subsegmental atelectasis.            Workstation performed: [id number]               Procedures  ECG 12 Lead Documentation Only    Date/Time: 7/21/2024 3:15 PM    Performed by: Chris Wick MD  Authorized by: Chris Wick MD    ECG reviewed by me, the ED Provider: yes    Patient location:  ED  Previous ECG:     Previous ECG:  Compared to current    Similarity:  No change  Interpretation:     Interpretation: abnormal    Rate:     ECG rate:  75    ECG rate assessment: normal    Rhythm:     Rhythm: atrial flutter    Ectopy:     Ectopy: none    QRS:     QRS axis:   Normal    QRS intervals:  Normal  Conduction:     Conduction: normal    ST segments:     ST segments:  Normal  T waves:     T waves: normal          ED Course  ED Course as of 07/22/24 2153   Sun Jul 21, 2024   1531 WBC(!): 11.11   1531 Absolute Neutrophils(!): 8.61   1554 hs TnI 0hr: 21  Less than his usual baseline   1554 Creatinine(!): 7.33  Significant change in Cr   1745 hs TnI 2hr: 17   1745 Delta 2hr hsTnI: -4   1803 Dialysis port dressing changed prior to discharge. Patient stated he was feeling better and ready for discharge.                             SBIRT 22yo+      Flowsheet Row Most Recent Value   Initial Alcohol Screen: US AUDIT-C     1. How often do you have a drink containing alcohol? 0 Filed at: 07/21/2024 1518   2. How many drinks containing alcohol do you have on a typical day you are drinking?  0 Filed at: 07/21/2024 1518   3a. Male UNDER 65: How often do you have five or more drinks on one occasion? 0 Filed at: 07/21/2024 1518   3b. FEMALE Any Age, or MALE 65+: How often do you have 4 or more drinks on one occassion? 0 Filed at: 07/21/2024 1518   Audit-C Score 0 Filed at: 07/21/2024 1518   ABDIEL: How many times in the past year have you...    Used an illegal drug or used a prescription medication for non-medical reasons? Never Filed at: 07/21/2024 1518                  Medical Decision Making  Male patient who presented to the ED for lightheadedness and dizziness has been ongoing for the past week.  See ED course for more details.  Differential diagnoses include but not limited to dehydration, dialysis complication, atrial fibrillation, atrial flutter, ACS, or pulmonary embolism.  Given patient's vital sign stability with no hypoxia or tachycardia noted as well as being on Eliquis, pulmonary embolism is less likely.  Given patient stable troponin levels and no signs of electrolyte abnormality or hemoconcentration, both ACS and dehydration are less likely.  Patient's EKG showed atrial flutter  which is rate controlled with a rate of 75 bpm.  Patient's chest x-ray showed cardiomegaly and right lower lobe sub-segmental atelectasis as per radiology.  Patient was given IV hydration for symptomatic treatment.  Plan was discussed with patient for discharge and explained that is likely complication related due to dialysis.  Patient was advised to follow-up outpatient with his PCP as needed and to return to the ED if his symptoms worsen.    Amount and/or Complexity of Data Reviewed  Labs: ordered. Decision-making details documented in ED Course.  Radiology: ordered and independent interpretation performed.          Disposition  Final diagnoses:   Dizziness   Atrial flutter (HCC)   Dialysis complication, initial encounter     Time reflects when diagnosis was documented in both MDM as applicable and the Disposition within this note       Time User Action Codes Description Comment    7/21/2024  5:47 PM Tolstoy, Chris Add [R42] Dizziness     7/21/2024  5:48 PM Tolstoy, Chris Add [I48.92] Atrial flutter (HCC)     7/21/2024  5:49 PM Tolstoy, Chris Add [T82.9XXA] Dialysis complication, initial encounter           ED Disposition       ED Disposition   Discharge    Condition   Stable    Date/Time   Sun Jul 21, 2024 1747    Comment   Jerod Worthington discharge to home/self care.                   Follow-up Information       Follow up With Specialties Details Why Contact Info Additional Information    [facility] Emergency Department Emergency Medicine Go to  If symptoms worsen [address] [address]  [phone] [facility] Emergency Department, [address] [address]            Discharge Medication List as of 7/21/2024  5:49 PM        CONTINUE these medications which have NOT CHANGED    Details   acetaminophen (TYLENOL) 325 mg tablet Take 2 tablets (650 mg total) by mouth every 6 (six) hours as needed for mild pain, Starting Wed 5/15/2024, No Print       amLODIPine (NORVASC) 5 mg tablet Take 1 tablet (5 mg total) by mouth daily, Starting Thu 5/16/2024, No Print      apixaban (ELIQUIS) 5 mg Take 1 tablet (5 mg total) by mouth 2 (two) times a day, Starting Wed 5/15/2024, No Print      b complex-vitamin C-folic acid (RENAL) 1 mg Take 1 capsule by mouth daily with dinner, Starting Wed 6/12/2024, Normal      ergocalciferol (ERGOCALCIFEROL) 1.25 MG (87072 UT) capsule Take 1 capsule (50,000 Units total) by mouth once a week, Starting Wed 6/12/2024, Normal      insulin lispro (HumALOG/ADMELOG) 100 units/mL injection Inject 1-6 Units under the skin 3 (three) times a day before meals, Starting Wed 5/15/2024, No Print      metoprolol tartrate (LOPRESSOR) 100 mg tablet Take 1 tablet (100 mg total) by mouth every 12 (twelve) hours, Starting Wed 5/15/2024, No Print      polyethylene glycol (MIRALAX) 17 g packet Take 17 g by mouth daily Hold for > 2 Loose Stools, Starting Thu 5/16/2024, No Print      senna (SENOKOT) 8.6 mg Take 2 tablets (17.2 mg total) by mouth in the morning, Starting Thu 5/16/2024, No Print      torsemide (DEMADEX) 20 mg tablet Take 2 tablets (40 mg total) by mouth every other day, Starting Thu 5/16/2024, No Print           No discharge procedures on file.    PDMP Review       None             ED Provider  Attending physically available and evaluated Jerod Worthington. I managed the patient along with the ED Attending.    Electronically Signed by           Chris Wick MD  07/22/24 7281

## 2024-11-18 NOTE — CASE MANAGEMENT
[facility] [place] has received APPROVED authorization.  Insurance: [employer] SCOTTIE   Called in by Rep: Nathalie JULIO#: [phone]  Authorization received for: SNF  Facility: [facility]   Authorization #: [id number]  Start of Care: 05/14   Next Review Date: 05/27  Submit next review to #: [phone]    Care Manager notified: Yanira Casillas      Please reach out to  for updates on any clinical information.    glasses